# Patient Record
Sex: FEMALE | Race: WHITE | NOT HISPANIC OR LATINO | Employment: OTHER | URBAN - METROPOLITAN AREA
[De-identification: names, ages, dates, MRNs, and addresses within clinical notes are randomized per-mention and may not be internally consistent; named-entity substitution may affect disease eponyms.]

---

## 2017-02-28 ENCOUNTER — APPOINTMENT (OUTPATIENT)
Dept: GENERAL RADIOLOGY | Facility: HOSPITAL | Age: 82
End: 2017-02-28

## 2017-02-28 ENCOUNTER — HOSPITAL ENCOUNTER (EMERGENCY)
Facility: HOSPITAL | Age: 82
Discharge: HOME | End: 2017-02-28
Attending: EMERGENCY MEDICINE | Admitting: EMERGENCY MEDICINE

## 2017-02-28 VITALS
TEMPERATURE: 98.7 F | OXYGEN SATURATION: 95 % | HEART RATE: 72 BPM | WEIGHT: 186 LBS | SYSTOLIC BLOOD PRESSURE: 169 MMHG | RESPIRATION RATE: 18 BRPM | HEIGHT: 68 IN | DIASTOLIC BLOOD PRESSURE: 84 MMHG | BODY MASS INDEX: 28.19 KG/M2

## 2017-02-28 DIAGNOSIS — R10.31 RIGHT GROIN PAIN: Primary | ICD-10-CM

## 2017-02-28 DIAGNOSIS — G57.11 MERALGIA PARESTHETICA OF RIGHT SIDE: ICD-10-CM

## 2017-02-28 PROCEDURE — 99284 EMERGENCY DEPT VISIT MOD MDM: CPT | Performed by: EMERGENCY MEDICINE

## 2017-02-28 PROCEDURE — 99283 EMERGENCY DEPT VISIT LOW MDM: CPT

## 2017-02-28 PROCEDURE — 73502 X-RAY EXAM HIP UNI 2-3 VIEWS: CPT

## 2017-02-28 RX ORDER — EZETIMIBE 10 MG/1
10 TABLET ORAL DAILY
COMMUNITY
End: 2017-09-15 | Stop reason: SDUPTHER

## 2017-02-28 RX ORDER — IBUPROFEN 800 MG/1
800 TABLET ORAL EVERY 6 HOURS PRN
COMMUNITY
End: 2018-04-11

## 2017-03-01 NOTE — DISCHARGE INSTRUCTIONS
Rest and apply heating pad to affected area as needed.  May take ibuprofen or Tylenol for additional pain relief.  Please return to the emergency room for any worsening pain, fevers, swelling, redness, rashes, weakness, numbness or any other concerns.

## 2017-03-01 NOTE — ED PROVIDER NOTES
Subjective   History of Present Illness  History of Present Illness    Chief complaint: Groin pain    Location: Right groin    Quality/Severity:  Moderate pain, burning    Timing/Duration: On-and-off for 2 weeks, worse today    Modifying Factors: Worse with bearing weight, walking    Narrative: Patient is a rather active 82-year-old lady who walks regularly.  Over the past couple of weeks she has noticed increasing and repeated pains to her right groin area.  She denies any recent injuries or falls or strain patterns that she can remember.  She notes that she does occasionally lift heavy objects at various locations.  She continues having some burning type of pain in the right upper thigh and groin area that is worse with bearing weight or moving her leg and a raised position.  She denies any fevers.  She denies any swelling or redness.  She denies any blisters or rashes.  She has been trying some ibuprofen as needed at home which doesn't relieve the pain temporarily but today seemed to be a worsening episode after walking around the neighborhood.    Associated Symptoms: As above    Review of Systems   Constitutional: Negative for activity change and fever.   HENT: Negative.    Eyes: Negative for pain and visual disturbance.   Respiratory: Negative for cough and shortness of breath.    Cardiovascular: Negative for chest pain.   Gastrointestinal: Negative for abdominal pain, nausea and vomiting.   Genitourinary: Negative for dysuria and flank pain.   Musculoskeletal: Positive for arthralgias, gait problem and myalgias. Negative for joint swelling.   Skin: Negative for color change, pallor, rash and wound.   Neurological: Negative for syncope and headaches.   Psychiatric/Behavioral: Negative for agitation and confusion.   All other systems reviewed and are negative.      Past Medical History   Diagnosis Date   • Diabetes mellitus    • Hyperlipidemia    • Panic attack        Allergies   Allergen Reactions   •  Penicillins        History reviewed. No pertinent past surgical history.    History reviewed. No pertinent family history.    Social History     Social History   • Marital status:      Spouse name: N/A   • Number of children: N/A   • Years of education: N/A     Social History Main Topics   • Smoking status: Former Smoker     Quit date: 9/7/1989   • Smokeless tobacco: None   • Alcohol use No   • Drug use: No   • Sexual activity: Not Asked     Other Topics Concern   • None     Social History Narrative   • None       ED Triage Vitals   Temp Heart Rate Resp BP SpO2   02/28/17 1842 02/28/17 1842 02/28/17 1842 02/28/17 1842 02/28/17 1842   98.7 °F (37.1 °C) 72 18 169/84 95 %      Temp src Heart Rate Source Patient Position BP Location FiO2 (%)   -- -- -- -- --                Objective   Physical Exam   Constitutional: She is oriented to person, place, and time. She appears well-developed and well-nourished. No distress.   HENT:   Head: Normocephalic and atraumatic.   Eyes: EOM are normal. Pupils are equal, round, and reactive to light. Right eye exhibits no discharge. Left eye exhibits no discharge.   Neck: Normal range of motion. Neck supple.   Cardiovascular: Normal rate, regular rhythm and intact distal pulses.    Pulmonary/Chest: Effort normal. No respiratory distress.   Musculoskeletal: Normal range of motion. She exhibits tenderness. She exhibits no edema or deformity.   The right groin and right hip area are really entirely nontender to palpation.  Patient can demonstrate full range of motion actively and tolerate full range of motion passively with flexion, extension, external and internal rotation of the hip joint.  There are no clicks, pops, or grinds with movement testing.  There is no induration or erythema or dermatologic changes at all.  The femoral artery is easily palpated and feels normal.  The leg is warm and well-perfused throughout.  Distal sensation is intact to the lower leg.   Neurological:  She is alert and oriented to person, place, and time.   Skin: Skin is warm and dry. No rash noted. She is not diaphoretic. No erythema.   Psychiatric: She has a normal mood and affect. Her behavior is normal. Judgment and thought content normal.   Nursing note and vitals reviewed.    RADIOLOGY        Study: X-ray right hip    Findings: Mild degenerative changes.  No fracture or dislocation    Interpreted Contemporaneously by none, independently viewed by me        Procedures         ED Course  ED Course   Comment By Time   X-ray appears rather reassuring to me for no acute orthopedic injury.  Consider arthritis changes versus possible meralgia paresthetica.  I spoke with patient about adjusting her activity level and continue symptomatic management at home.  Advise she might consider following up with orthopedist for further evaluation as needed. Cirilo Fatima MD 02/28 2238                  MDM  Number of Diagnoses or Management Options  Meralgia paresthetica of right side:   Right groin pain:      Amount and/or Complexity of Data Reviewed  Tests in the radiology section of CPT®: ordered and reviewed  Independent visualization of images, tracings, or specimens: yes    Risk of Complications, Morbidity, and/or Mortality  Presenting problems: moderate  Diagnostic procedures: low  Management options: low        Final diagnoses:   Right groin pain   Meralgia paresthetica of right side            Cirilo Fatima MD  02/28/17 8627

## 2017-03-27 ENCOUNTER — HOSPITAL ENCOUNTER (OUTPATIENT)
Facility: HOSPITAL | Age: 82
Setting detail: OBSERVATION
Discharge: HOME | End: 2017-03-28
Attending: EMERGENCY MEDICINE | Admitting: INTERNAL MEDICINE

## 2017-03-27 ENCOUNTER — APPOINTMENT (OUTPATIENT)
Dept: GENERAL RADIOLOGY | Facility: HOSPITAL | Age: 82
End: 2017-03-27

## 2017-03-27 ENCOUNTER — APPOINTMENT (OUTPATIENT)
Dept: CT IMAGING | Facility: HOSPITAL | Age: 82
End: 2017-03-27

## 2017-03-27 DIAGNOSIS — S62.102A LEFT WRIST FRACTURE, CLOSED, INITIAL ENCOUNTER: ICD-10-CM

## 2017-03-27 DIAGNOSIS — S60.211A CONTUSION OF RIGHT WRIST, INITIAL ENCOUNTER: ICD-10-CM

## 2017-03-27 DIAGNOSIS — S70.02XA CONTUSION OF LEFT HIP, INITIAL ENCOUNTER: ICD-10-CM

## 2017-03-27 DIAGNOSIS — R26.2 DIFFICULTY WALKING: ICD-10-CM

## 2017-03-27 DIAGNOSIS — R42 POSTURAL DIZZINESS: Primary | ICD-10-CM

## 2017-03-27 DIAGNOSIS — R11.11 NON-INTRACTABLE VOMITING WITHOUT NAUSEA, UNSPECIFIED VOMITING TYPE: ICD-10-CM

## 2017-03-27 LAB
ALBUMIN SERPL-MCNC: 4 G/DL (ref 3.5–5.2)
ALBUMIN/GLOB SERPL: 1.5 G/DL
ALP SERPL-CCNC: 77 U/L (ref 40–129)
ALT SERPL W P-5'-P-CCNC: 22 U/L (ref 5–33)
ANION GAP SERPL CALCULATED.3IONS-SCNC: 13.3 MMOL/L
AST SERPL-CCNC: 36 U/L (ref 5–32)
BASOPHILS # BLD AUTO: 0.04 10*3/MM3 (ref 0–0.2)
BASOPHILS NFR BLD AUTO: 0.3 % (ref 0–2)
BILIRUB SERPL-MCNC: 0.7 MG/DL (ref 0.2–1.2)
BUN BLD-MCNC: 23 MG/DL (ref 8–23)
BUN/CREAT SERPL: 18.1 (ref 7–25)
CALCIUM SPEC-SCNC: 9.1 MG/DL (ref 8.8–10.5)
CHLORIDE SERPL-SCNC: 99 MMOL/L (ref 98–107)
CO2 SERPL-SCNC: 26.7 MMOL/L (ref 22–29)
CREAT BLD-MCNC: 1.27 MG/DL (ref 0.57–1)
DEPRECATED RDW RBC AUTO: 41.5 FL (ref 37–54)
EOSINOPHIL # BLD AUTO: 0 10*3/MM3 (ref 0.1–0.3)
EOSINOPHIL NFR BLD AUTO: 0 % (ref 0–4)
ERYTHROCYTE [DISTWIDTH] IN BLOOD BY AUTOMATED COUNT: 12.6 % (ref 11.5–14.5)
GFR SERPL CREATININE-BSD FRML MDRD: 40 ML/MIN/1.73
GLOBULIN UR ELPH-MCNC: 2.6 GM/DL
GLUCOSE BLD-MCNC: 218 MG/DL (ref 65–99)
HCT VFR BLD AUTO: 35.7 % (ref 37–47)
HGB BLD-MCNC: 11.8 G/DL (ref 12–16)
IMM GRANULOCYTES # BLD: 0.07 10*3/MM3 (ref 0–0.03)
IMM GRANULOCYTES NFR BLD: 0.5 % (ref 0–0.5)
LIPASE SERPL-CCNC: 91 U/L (ref 13–60)
LYMPHOCYTES # BLD AUTO: 1.57 10*3/MM3 (ref 0.6–4.8)
LYMPHOCYTES NFR BLD AUTO: 10.9 % (ref 20–45)
MCH RBC QN AUTO: 29.5 PG (ref 27–31)
MCHC RBC AUTO-ENTMCNC: 33.1 G/DL (ref 31–37)
MCV RBC AUTO: 89.3 FL (ref 81–99)
MONOCYTES # BLD AUTO: 1.13 10*3/MM3 (ref 0–1)
MONOCYTES NFR BLD AUTO: 7.9 % (ref 3–8)
NEUTROPHILS # BLD AUTO: 11.58 10*3/MM3 (ref 1.5–8.3)
NEUTROPHILS NFR BLD AUTO: 80.4 % (ref 45–70)
NRBC BLD MANUAL-RTO: 0 /100 WBC (ref 0–0)
PLATELET # BLD AUTO: 253 10*3/MM3 (ref 140–500)
PMV BLD AUTO: 10.1 FL (ref 7.4–10.4)
POTASSIUM BLD-SCNC: 4.4 MMOL/L (ref 3.5–5.2)
PROT SERPL-MCNC: 6.6 G/DL (ref 6–8.5)
RBC # BLD AUTO: 4 10*6/MM3 (ref 4.2–5.4)
SODIUM BLD-SCNC: 139 MMOL/L (ref 136–145)
WBC NRBC COR # BLD: 14.39 10*3/MM3 (ref 4.8–10.8)

## 2017-03-27 PROCEDURE — 99285 EMERGENCY DEPT VISIT HI MDM: CPT | Performed by: EMERGENCY MEDICINE

## 2017-03-27 PROCEDURE — 73502 X-RAY EXAM HIP UNI 2-3 VIEWS: CPT

## 2017-03-27 PROCEDURE — 96374 THER/PROPH/DIAG INJ IV PUSH: CPT

## 2017-03-27 PROCEDURE — 80053 COMPREHEN METABOLIC PANEL: CPT | Performed by: EMERGENCY MEDICINE

## 2017-03-27 PROCEDURE — 25010000002 ONDANSETRON PER 1 MG: Performed by: EMERGENCY MEDICINE

## 2017-03-27 PROCEDURE — 73130 X-RAY EXAM OF HAND: CPT

## 2017-03-27 PROCEDURE — 85025 COMPLETE CBC W/AUTO DIFF WBC: CPT | Performed by: EMERGENCY MEDICINE

## 2017-03-27 PROCEDURE — 83690 ASSAY OF LIPASE: CPT | Performed by: EMERGENCY MEDICINE

## 2017-03-27 PROCEDURE — 70450 CT HEAD/BRAIN W/O DYE: CPT

## 2017-03-27 PROCEDURE — 99283 EMERGENCY DEPT VISIT LOW MDM: CPT

## 2017-03-27 PROCEDURE — 96361 HYDRATE IV INFUSION ADD-ON: CPT

## 2017-03-27 PROCEDURE — 73110 X-RAY EXAM OF WRIST: CPT

## 2017-03-27 RX ORDER — SODIUM CHLORIDE 0.9 % (FLUSH) 0.9 %
10 SYRINGE (ML) INJECTION AS NEEDED
Status: DISCONTINUED | OUTPATIENT
Start: 2017-03-27 | End: 2017-03-28 | Stop reason: HOSPADM

## 2017-03-27 RX ORDER — SODIUM CHLORIDE 9 MG/ML
250 INJECTION, SOLUTION INTRAVENOUS CONTINUOUS
Status: DISCONTINUED | OUTPATIENT
Start: 2017-03-27 | End: 2017-03-28

## 2017-03-27 RX ORDER — ONDANSETRON 2 MG/ML
4 INJECTION INTRAMUSCULAR; INTRAVENOUS ONCE
Status: COMPLETED | OUTPATIENT
Start: 2017-03-27 | End: 2017-03-27

## 2017-03-27 RX ADMIN — SODIUM CHLORIDE 250 ML: 9 INJECTION, SOLUTION INTRAVENOUS at 22:55

## 2017-03-27 RX ADMIN — ONDANSETRON 4 MG: 2 INJECTION, SOLUTION INTRAMUSCULAR; INTRAVENOUS at 22:55

## 2017-03-28 ENCOUNTER — APPOINTMENT (OUTPATIENT)
Dept: GENERAL RADIOLOGY | Facility: HOSPITAL | Age: 82
End: 2017-03-28

## 2017-03-28 VITALS
WEIGHT: 184.5 LBS | BODY MASS INDEX: 27.33 KG/M2 | RESPIRATION RATE: 18 BRPM | OXYGEN SATURATION: 96 % | TEMPERATURE: 97.2 F | HEIGHT: 69 IN | DIASTOLIC BLOOD PRESSURE: 66 MMHG | SYSTOLIC BLOOD PRESSURE: 130 MMHG | HEART RATE: 110 BPM

## 2017-03-28 PROBLEM — R42 POSTURAL DIZZINESS: Status: ACTIVE | Noted: 2017-03-28

## 2017-03-28 LAB
25(OH)D3 SERPL-MCNC: 25.1 NG/ML
ANION GAP SERPL CALCULATED.3IONS-SCNC: 10.8 MMOL/L
ANION GAP SERPL CALCULATED.3IONS-SCNC: 9.9 MMOL/L
BACTERIA UR QL AUTO: ABNORMAL /HPF
BASOPHILS # BLD AUTO: 0.03 10*3/MM3 (ref 0–0.2)
BASOPHILS # BLD AUTO: 0.03 10*3/MM3 (ref 0–0.2)
BASOPHILS NFR BLD AUTO: 0.3 % (ref 0–2)
BASOPHILS NFR BLD AUTO: 0.3 % (ref 0–2)
BILIRUB UR QL STRIP: NEGATIVE
BUN BLD-MCNC: 20 MG/DL (ref 8–23)
BUN BLD-MCNC: 22 MG/DL (ref 8–23)
BUN/CREAT SERPL: 17.5 (ref 7–25)
BUN/CREAT SERPL: 20.2 (ref 7–25)
CALCIUM SPEC-SCNC: 8.2 MG/DL (ref 8.8–10.5)
CALCIUM SPEC-SCNC: 8.2 MG/DL (ref 8.8–10.5)
CHLORIDE SERPL-SCNC: 101 MMOL/L (ref 98–107)
CHLORIDE SERPL-SCNC: 103 MMOL/L (ref 98–107)
CLARITY UR: CLEAR
CO2 SERPL-SCNC: 24.1 MMOL/L (ref 22–29)
CO2 SERPL-SCNC: 25.2 MMOL/L (ref 22–29)
COLOR UR: YELLOW
CREAT BLD-MCNC: 1.09 MG/DL (ref 0.57–1)
CREAT BLD-MCNC: 1.14 MG/DL (ref 0.57–1)
DEPRECATED RDW RBC AUTO: 42.1 FL (ref 37–54)
DEPRECATED RDW RBC AUTO: 42.3 FL (ref 37–54)
EOSINOPHIL # BLD AUTO: 0 10*3/MM3 (ref 0.1–0.3)
EOSINOPHIL # BLD AUTO: 0 10*3/MM3 (ref 0.1–0.3)
EOSINOPHIL NFR BLD AUTO: 0 % (ref 0–4)
EOSINOPHIL NFR BLD AUTO: 0 % (ref 0–4)
ERYTHROCYTE [DISTWIDTH] IN BLOOD BY AUTOMATED COUNT: 12.8 % (ref 11.5–14.5)
ERYTHROCYTE [DISTWIDTH] IN BLOOD BY AUTOMATED COUNT: 12.8 % (ref 11.5–14.5)
GFR SERPL CREATININE-BSD FRML MDRD: 46 ML/MIN/1.73
GFR SERPL CREATININE-BSD FRML MDRD: 48 ML/MIN/1.73
GLUCOSE BLD-MCNC: 158 MG/DL (ref 65–99)
GLUCOSE BLD-MCNC: 199 MG/DL (ref 65–99)
GLUCOSE BLDC GLUCOMTR-MCNC: 175 MG/DL (ref 70–130)
GLUCOSE BLDC GLUCOMTR-MCNC: 196 MG/DL (ref 70–130)
GLUCOSE BLDC GLUCOMTR-MCNC: 221 MG/DL (ref 70–130)
GLUCOSE UR STRIP-MCNC: NEGATIVE MG/DL
HBA1C MFR BLD: 6.8 % (ref 4.8–5.6)
HCT VFR BLD AUTO: 30.5 % (ref 37–47)
HCT VFR BLD AUTO: 31.4 % (ref 37–47)
HGB BLD-MCNC: 10.4 G/DL (ref 12–16)
HGB BLD-MCNC: 9.8 G/DL (ref 12–16)
HGB UR QL STRIP.AUTO: NEGATIVE
HYALINE CASTS UR QL AUTO: ABNORMAL /LPF
IMM GRANULOCYTES # BLD: 0.04 10*3/MM3 (ref 0–0.03)
IMM GRANULOCYTES # BLD: 0.04 10*3/MM3 (ref 0–0.03)
IMM GRANULOCYTES NFR BLD: 0.3 % (ref 0–0.5)
IMM GRANULOCYTES NFR BLD: 0.3 % (ref 0–0.5)
KETONES UR QL STRIP: ABNORMAL
LEUKOCYTE ESTERASE UR QL STRIP.AUTO: ABNORMAL
LIPASE SERPL-CCNC: 50 U/L (ref 13–60)
LYMPHOCYTES # BLD AUTO: 1.31 10*3/MM3 (ref 0.6–4.8)
LYMPHOCYTES # BLD AUTO: 2.65 10*3/MM3 (ref 0.6–4.8)
LYMPHOCYTES NFR BLD AUTO: 10.9 % (ref 20–45)
LYMPHOCYTES NFR BLD AUTO: 22.8 % (ref 20–45)
MCH RBC QN AUTO: 28.9 PG (ref 27–31)
MCH RBC QN AUTO: 30 PG (ref 27–31)
MCHC RBC AUTO-ENTMCNC: 32.1 G/DL (ref 31–37)
MCHC RBC AUTO-ENTMCNC: 33.1 G/DL (ref 31–37)
MCV RBC AUTO: 90 FL (ref 81–99)
MCV RBC AUTO: 90.5 FL (ref 81–99)
MONOCYTES # BLD AUTO: 0.75 10*3/MM3 (ref 0–1)
MONOCYTES # BLD AUTO: 1.04 10*3/MM3 (ref 0–1)
MONOCYTES NFR BLD AUTO: 6.3 % (ref 3–8)
MONOCYTES NFR BLD AUTO: 9 % (ref 3–8)
MUCOUS THREADS URNS QL MICRO: ABNORMAL /HPF
NEUTROPHILS # BLD AUTO: 7.86 10*3/MM3 (ref 1.5–8.3)
NEUTROPHILS # BLD AUTO: 9.84 10*3/MM3 (ref 1.5–8.3)
NEUTROPHILS NFR BLD AUTO: 67.6 % (ref 45–70)
NEUTROPHILS NFR BLD AUTO: 82.2 % (ref 45–70)
NITRITE UR QL STRIP: NEGATIVE
NRBC BLD MANUAL-RTO: 0 /100 WBC (ref 0–0)
NRBC BLD MANUAL-RTO: 0 /100 WBC (ref 0–0)
PH UR STRIP.AUTO: 6 [PH] (ref 4.5–8)
PLATELET # BLD AUTO: 194 10*3/MM3 (ref 140–500)
PLATELET # BLD AUTO: 219 10*3/MM3 (ref 140–500)
PMV BLD AUTO: 10.4 FL (ref 7.4–10.4)
PMV BLD AUTO: 10.4 FL (ref 7.4–10.4)
POTASSIUM BLD-SCNC: 4.4 MMOL/L (ref 3.5–5.2)
POTASSIUM BLD-SCNC: 4.4 MMOL/L (ref 3.5–5.2)
PROT UR QL STRIP: NEGATIVE
RBC # BLD AUTO: 3.39 10*6/MM3 (ref 4.2–5.4)
RBC # BLD AUTO: 3.47 10*6/MM3 (ref 4.2–5.4)
RBC # UR: ABNORMAL /HPF
REF LAB TEST METHOD: ABNORMAL
SODIUM BLD-SCNC: 135 MMOL/L (ref 136–145)
SODIUM BLD-SCNC: 139 MMOL/L (ref 136–145)
SP GR UR STRIP: 1.02 (ref 1–1.03)
SQUAMOUS #/AREA URNS HPF: ABNORMAL /HPF
TSH SERPL DL<=0.05 MIU/L-ACNC: 2.61 MIU/ML (ref 0.27–4.2)
UROBILINOGEN UR QL STRIP: ABNORMAL
WBC NRBC COR # BLD: 11.62 10*3/MM3 (ref 4.8–10.8)
WBC NRBC COR # BLD: 11.97 10*3/MM3 (ref 4.8–10.8)
WBC UR QL AUTO: ABNORMAL /HPF

## 2017-03-28 PROCEDURE — 82962 GLUCOSE BLOOD TEST: CPT

## 2017-03-28 PROCEDURE — G0378 HOSPITAL OBSERVATION PER HR: HCPCS

## 2017-03-28 PROCEDURE — G8987 SELF CARE CURRENT STATUS: HCPCS

## 2017-03-28 PROCEDURE — 93010 ELECTROCARDIOGRAM REPORT: CPT | Performed by: INTERNAL MEDICINE

## 2017-03-28 PROCEDURE — 83036 HEMOGLOBIN GLYCOSYLATED A1C: CPT | Performed by: NURSE PRACTITIONER

## 2017-03-28 PROCEDURE — 97165 OT EVAL LOW COMPLEX 30 MIN: CPT

## 2017-03-28 PROCEDURE — 25600 CLTX DST RDL FX/EPHYS SEP WO: CPT | Performed by: ORTHOPAEDIC SURGERY

## 2017-03-28 PROCEDURE — 87086 URINE CULTURE/COLONY COUNT: CPT | Performed by: EMERGENCY MEDICINE

## 2017-03-28 PROCEDURE — 96361 HYDRATE IV INFUSION ADD-ON: CPT

## 2017-03-28 PROCEDURE — 85025 COMPLETE CBC W/AUTO DIFF WBC: CPT | Performed by: INTERNAL MEDICINE

## 2017-03-28 PROCEDURE — G8978 MOBILITY CURRENT STATUS: HCPCS

## 2017-03-28 PROCEDURE — 96375 TX/PRO/DX INJ NEW DRUG ADDON: CPT

## 2017-03-28 PROCEDURE — G8979 MOBILITY GOAL STATUS: HCPCS

## 2017-03-28 PROCEDURE — 73130 X-RAY EXAM OF HAND: CPT

## 2017-03-28 PROCEDURE — G8980 MOBILITY D/C STATUS: HCPCS

## 2017-03-28 PROCEDURE — 80048 BASIC METABOLIC PNL TOTAL CA: CPT | Performed by: INTERNAL MEDICINE

## 2017-03-28 PROCEDURE — G8989 SELF CARE D/C STATUS: HCPCS

## 2017-03-28 PROCEDURE — 82306 VITAMIN D 25 HYDROXY: CPT | Performed by: NURSE PRACTITIONER

## 2017-03-28 PROCEDURE — 25010000002 ONDANSETRON PER 1 MG: Performed by: INTERNAL MEDICINE

## 2017-03-28 PROCEDURE — 96376 TX/PRO/DX INJ SAME DRUG ADON: CPT

## 2017-03-28 PROCEDURE — 81001 URINALYSIS AUTO W/SCOPE: CPT | Performed by: EMERGENCY MEDICINE

## 2017-03-28 PROCEDURE — G8988 SELF CARE GOAL STATUS: HCPCS

## 2017-03-28 PROCEDURE — 97161 PT EVAL LOW COMPLEX 20 MIN: CPT

## 2017-03-28 PROCEDURE — 73110 X-RAY EXAM OF WRIST: CPT

## 2017-03-28 PROCEDURE — 99234 HOSP IP/OBS SM DT SF/LOW 45: CPT | Performed by: NURSE PRACTITIONER

## 2017-03-28 PROCEDURE — 84443 ASSAY THYROID STIM HORMONE: CPT | Performed by: INTERNAL MEDICINE

## 2017-03-28 PROCEDURE — 83690 ASSAY OF LIPASE: CPT | Performed by: NURSE PRACTITIONER

## 2017-03-28 PROCEDURE — 93005 ELECTROCARDIOGRAM TRACING: CPT | Performed by: NURSE PRACTITIONER

## 2017-03-28 PROCEDURE — 63710000001 INSULIN ASPART PER 5 UNITS: Performed by: INTERNAL MEDICINE

## 2017-03-28 RX ORDER — MULTIVIT WITH MINERALS/LUTEIN
250 TABLET ORAL DAILY
Status: ON HOLD | COMMUNITY
End: 2021-08-18

## 2017-03-28 RX ORDER — PANTOPRAZOLE SODIUM 40 MG/10ML
40 INJECTION, POWDER, LYOPHILIZED, FOR SOLUTION INTRAVENOUS
Status: DISCONTINUED | OUTPATIENT
Start: 2017-03-28 | End: 2017-03-28

## 2017-03-28 RX ORDER — DEXTROSE MONOHYDRATE 25 G/50ML
25 INJECTION, SOLUTION INTRAVENOUS
Status: DISCONTINUED | OUTPATIENT
Start: 2017-03-28 | End: 2017-03-28 | Stop reason: HOSPADM

## 2017-03-28 RX ORDER — FAMOTIDINE 20 MG/1
20 TABLET, FILM COATED ORAL NIGHTLY
Status: DISCONTINUED | OUTPATIENT
Start: 2017-03-28 | End: 2017-03-28 | Stop reason: HOSPADM

## 2017-03-28 RX ORDER — NICOTINE POLACRILEX 4 MG
15 LOZENGE BUCCAL
Status: DISCONTINUED | OUTPATIENT
Start: 2017-03-28 | End: 2017-03-28 | Stop reason: HOSPADM

## 2017-03-28 RX ORDER — MELATONIN
2000 DAILY
Status: DISCONTINUED | OUTPATIENT
Start: 2017-03-28 | End: 2017-03-28 | Stop reason: HOSPADM

## 2017-03-28 RX ORDER — SODIUM CHLORIDE 0.9 % (FLUSH) 0.9 %
1-10 SYRINGE (ML) INJECTION AS NEEDED
Status: DISCONTINUED | OUTPATIENT
Start: 2017-03-28 | End: 2017-03-28 | Stop reason: HOSPADM

## 2017-03-28 RX ORDER — MULTIPLE VITAMINS W/ MINERALS TAB 9MG-400MCG
1 TAB ORAL DAILY
Status: ON HOLD | COMMUNITY
End: 2021-08-18

## 2017-03-28 RX ORDER — SIMVASTATIN 10 MG
10 TABLET ORAL NIGHTLY
Status: DISCONTINUED | OUTPATIENT
Start: 2017-03-28 | End: 2017-03-28 | Stop reason: CLARIF

## 2017-03-28 RX ORDER — TRAMADOL HYDROCHLORIDE 50 MG/1
50 TABLET ORAL EVERY 6 HOURS PRN
Status: DISCONTINUED | OUTPATIENT
Start: 2017-03-28 | End: 2017-03-28 | Stop reason: HOSPADM

## 2017-03-28 RX ORDER — ATORVASTATIN CALCIUM 10 MG/1
5 TABLET, FILM COATED ORAL NIGHTLY
Status: DISCONTINUED | OUTPATIENT
Start: 2017-03-28 | End: 2017-03-28 | Stop reason: HOSPADM

## 2017-03-28 RX ORDER — ACETAMINOPHEN 325 MG/1
650 TABLET ORAL EVERY 6 HOURS PRN
Status: DISCONTINUED | OUTPATIENT
Start: 2017-03-28 | End: 2017-03-28 | Stop reason: HOSPADM

## 2017-03-28 RX ORDER — ONDANSETRON 2 MG/ML
4 INJECTION INTRAMUSCULAR; INTRAVENOUS EVERY 6 HOURS PRN
Status: DISCONTINUED | OUTPATIENT
Start: 2017-03-28 | End: 2017-03-28 | Stop reason: HOSPADM

## 2017-03-28 RX ORDER — SODIUM CHLORIDE 9 MG/ML
100 INJECTION, SOLUTION INTRAVENOUS CONTINUOUS
Status: ACTIVE | OUTPATIENT
Start: 2017-03-28 | End: 2017-03-28

## 2017-03-28 RX ORDER — FLUOXETINE HYDROCHLORIDE 20 MG/1
20 CAPSULE ORAL DAILY
Status: DISCONTINUED | OUTPATIENT
Start: 2017-03-28 | End: 2017-03-28 | Stop reason: HOSPADM

## 2017-03-28 RX ADMIN — ACETAMINOPHEN 650 MG: 325 TABLET, FILM COATED ORAL at 01:50

## 2017-03-28 RX ADMIN — INSULIN ASPART 3 UNITS: 100 INJECTION, SOLUTION INTRAVENOUS; SUBCUTANEOUS at 12:25

## 2017-03-28 RX ADMIN — FAMOTIDINE 20 MG: 20 TABLET, FILM COATED ORAL at 01:51

## 2017-03-28 RX ADMIN — ONDANSETRON 4 MG: 2 INJECTION, SOLUTION INTRAMUSCULAR; INTRAVENOUS at 01:50

## 2017-03-28 RX ADMIN — VITAMIN D 2000 UNITS: 25 TAB ORAL at 12:25

## 2017-03-28 RX ADMIN — PANTOPRAZOLE SODIUM 40 MG: 40 INJECTION, POWDER, FOR SOLUTION INTRAVENOUS at 01:51

## 2017-03-28 RX ADMIN — ACETAMINOPHEN 650 MG: 325 TABLET, FILM COATED ORAL at 08:42

## 2017-03-28 RX ADMIN — INSULIN ASPART 2 UNITS: 100 INJECTION, SOLUTION INTRAVENOUS; SUBCUTANEOUS at 08:42

## 2017-03-28 RX ADMIN — SODIUM CHLORIDE 100 ML/HR: 9 INJECTION, SOLUTION INTRAVENOUS at 01:51

## 2017-03-28 RX ADMIN — PANTOPRAZOLE SODIUM 40 MG: 40 INJECTION, POWDER, FOR SOLUTION INTRAVENOUS at 06:37

## 2017-03-28 RX ADMIN — FLUOXETINE 20 MG: 20 CAPSULE ORAL at 08:42

## 2017-03-28 RX ADMIN — TRAMADOL HYDROCHLORIDE 50 MG: 50 TABLET, FILM COATED ORAL at 04:33

## 2017-03-29 LAB — BACTERIA SPEC AEROBE CULT: NO GROWTH

## 2017-04-05 ENCOUNTER — OFFICE VISIT (OUTPATIENT)
Dept: ORTHOPEDIC SURGERY | Facility: CLINIC | Age: 82
End: 2017-04-05

## 2017-04-05 VITALS
HEART RATE: 84 BPM | WEIGHT: 186 LBS | BODY MASS INDEX: 27.55 KG/M2 | SYSTOLIC BLOOD PRESSURE: 146 MMHG | HEIGHT: 69 IN | DIASTOLIC BLOOD PRESSURE: 90 MMHG

## 2017-04-05 DIAGNOSIS — S60.212A CONTUSION OF WRIST, LEFT: Primary | ICD-10-CM

## 2017-04-05 PROCEDURE — 99214 OFFICE O/P EST MOD 30 MIN: CPT | Performed by: ORTHOPAEDIC SURGERY

## 2017-04-05 RX ORDER — SIMVASTATIN 20 MG
TABLET ORAL
COMMUNITY
Start: 2017-03-13 | End: 2017-09-15 | Stop reason: SDUPTHER

## 2017-04-05 NOTE — PROGRESS NOTES
Subjective: Left wrist pain     Patient ID: Magnolia Rios is a 82 y.o. female.    Chief Complaint:    History of Present Illness 82-year-old female right-hand-dominant is seen for left wrist which he injured on Monday, March 27 when she fell down a flight of steps in her basement at her home.  States she was climbing into the top of the stairs and attempted open a door she lost her balance and fell backwards falling down the steps.  Does not recall anything after that tissue is in the emergency room.  She had apparent loss of consciousness.  Evaluated in the ER for bruising over left hip and left wrist and had a CT scan of her head.  I reviewed all the x-rays and the x-ray report.  All were negative for acute injuries.  The CT of the head of course was negative for bleed.  She is placed in a wrist splint on the left wrist.  She states that they've told she had a fracture.  Today she is alert and oriented in mild distress because of the left wrist more so because of having or the splint and the sling than any pain in the wrist itself.  Some soreness over the lateral aspect of the left hip in generalized soreness and falling down the steps but no apparent sequela I other than left wrist pain at this time.       Social History     Occupational History   • Not on file.     Social History Main Topics   • Smoking status: Former Smoker     Quit date: 9/7/1989   • Smokeless tobacco: Not on file   • Alcohol use No   • Drug use: No   • Sexual activity: Not on file      Review of Systems   Constitutional: Negative for chills, diaphoresis, fever and unexpected weight change.   HENT: Positive for tinnitus. Negative for hearing loss, nosebleeds and sore throat.    Eyes: Negative for pain and visual disturbance.   Respiratory: Negative for cough, shortness of breath and wheezing.    Cardiovascular: Negative for chest pain and palpitations.   Gastrointestinal: Positive for nausea and vomiting. Negative for abdominal pain and  diarrhea.   Endocrine: Negative for cold intolerance, heat intolerance and polydipsia.   Genitourinary: Negative for difficulty urinating, dysuria and hematuria.   Musculoskeletal: Positive for arthralgias, joint swelling and myalgias.   Skin: Negative for rash and wound.   Allergic/Immunologic: Negative for environmental allergies.   Neurological: Negative for dizziness, syncope and numbness.   Hematological: Does not bruise/bleed easily.   Psychiatric/Behavioral: Negative for dysphoric mood and sleep disturbance. The patient is nervous/anxious.          Past Medical History:   Diagnosis Date   • Diabetes mellitus    • Hyperlipidemia    • Panic attack      Past Surgical History:   Procedure Laterality Date   • COLONOSCOPY     • HYSTERECTOMY       No family history on file.      Objective:  Vitals:    04/05/17 1435   BP: 146/90   Pulse: 84     Last 3 weights    04/05/17  1435   Weight: 186 lb (84.4 kg)     Body mass index is 27.47 kg/(m^2).       Ortho Exam  patient is alert and oriented white female in no acute distress at this point.  She is well-nourished.  Vital signs are documented in the chart.  His no facial contusions involving her eyes are nose or bruising .  Sclera clear.  No complaints of neck pain.  She is able to take anti-inflammatories and has been taking ibuprofen twice a day since she has no GI symptoms.  She is neurologically intact as far as being oriented to person place and time.  Exam of the left upper extremity shows ecchymosis in the volar aspect of the forearm.  Some bruising about the wrist.  The skin is intact.  The skin is cool to touch.  His no motor deficit good distal pulses.  No sensory loss.  Mild swelling to the forearm but no evidence of compartmental compromise she has good capillary refill.  The some tenderness in the volar aspect over the musculature of the wrist but no bone tenderness on palpation.  She has good intact radial ulnar and median nerve function of the wrist and  hand.  She has full range of motion of the elbow.  She has full pronation and supination of the elbow in flexion and extension.  She can dorsiflex the wrist 25-30° and volar flex the same.  Minimal to no pain with ulnar radial deviation.  She can extend the digits without pain or discomfort.  Minimal swelling noted to the digit but she has full flexion of the hand.  She is in moderate distress for more from wearing a cradle sling than the wrist pain.  Assessment:       1. Contusion of wrist, left          Plan:      I reviewed the x-rays and told her I don't see a fracture.  I saved is sometimes though are difficult to see on initial x-rays after an injury.  At this time I recommend to remove the wrist splint when at home and begin moist heat for 20 minutes twice a day with range of motion to the wrist followed by cold pack for 5 minutes.  She is to take ibuprofen 600 mg 3 times a day.  She is to wear this wrist splint when she is out of the home but she doesn't need to wear it in the past.  She can DC the sling today.  Return to see me in 2 weeks with a repeat x-ray of the wrist.  Reviewed the physical findings and treatment plan with the patient and her daughter and in agreement.      Work Status:    KATHRYN query complete.    Orders:  No orders of the defined types were placed in this encounter.      Medications:  New Medications Ordered This Visit   Medications   • simvastatin (ZOCOR) 20 MG tablet       Followup:  Return in about 2 weeks (around 4/19/2017).          Dragon transcription disclaimer     Much of this encounter note is an electronic transcription/translation of spoken language to printed text. The electronic translation of spoken language may permit erroneous, or at times, nonsensical words or phrases to be inadvertently transcribed. Although I have reviewed the note for such errors, some may still exist.

## 2017-04-17 ENCOUNTER — OFFICE VISIT (OUTPATIENT)
Dept: ORTHOPEDIC SURGERY | Facility: CLINIC | Age: 82
End: 2017-04-17

## 2017-04-17 DIAGNOSIS — R52 PAIN: Primary | ICD-10-CM

## 2017-04-17 DIAGNOSIS — S60.212A CONTUSION OF WRIST, LEFT: ICD-10-CM

## 2017-04-17 PROCEDURE — 73110 X-RAY EXAM OF WRIST: CPT | Performed by: ORTHOPAEDIC SURGERY

## 2017-04-17 PROCEDURE — 99024 POSTOP FOLLOW-UP VISIT: CPT | Performed by: ORTHOPAEDIC SURGERY

## 2017-04-17 RX ORDER — ALPRAZOLAM 0.25 MG/1
TABLET ORAL
COMMUNITY
Start: 2017-04-15 | End: 2017-09-15 | Stop reason: SDUPTHER

## 2017-04-17 NOTE — PROGRESS NOTES
Subjective: Left wrist pain     Patient ID: Magnolia Rios is a 82 y.o. female.    Chief Complaint:    History of Present Illness 82-year-old female is seen in follow-up to the left wrist which he injured couple weeks ago when she fell down her basement steps.  That time she felt to have a contusion was placed in a wrist immobilizer instructed on nonsteroidal anti-inflammatories.  She states she is doing much better.  She states now the only time she has pain as she takes her splint off at nighttime when she goes to bed we'll then have some soreness and during the day she has minimal of any discomfort.       Social History     Occupational History   • Not on file.     Social History Main Topics   • Smoking status: Former Smoker     Quit date: 9/7/1989   • Smokeless tobacco: Not on file   • Alcohol use No   • Drug use: No   • Sexual activity: Not on file      Review of Systems   Constitutional: Negative for chills, diaphoresis, fever and unexpected weight change.   HENT: Negative for hearing loss, nosebleeds, sore throat and tinnitus.    Eyes: Negative for pain and visual disturbance.   Respiratory: Negative for cough, shortness of breath and wheezing.    Cardiovascular: Negative for chest pain and palpitations.   Gastrointestinal: Negative for abdominal pain, diarrhea, nausea and vomiting.   Endocrine: Negative for cold intolerance, heat intolerance and polydipsia.   Genitourinary: Negative for difficulty urinating, dysuria and hematuria.   Musculoskeletal: Negative for arthralgias, joint swelling and myalgias.   Skin: Negative for rash and wound.   Allergic/Immunologic: Negative for environmental allergies.   Neurological: Negative for dizziness, syncope and numbness.   Hematological: Does not bruise/bleed easily.   Psychiatric/Behavioral: Negative for dysphoric mood and sleep disturbance. The patient is not nervous/anxious.    All other systems reviewed and are negative.        Past Medical History:   Diagnosis  Date   • Diabetes mellitus    • Hyperlipidemia    • Panic attack      Past Surgical History:   Procedure Laterality Date   • COLONOSCOPY     • HYSTERECTOMY       No family history on file.      Objective:  There were no vitals filed for this visit.  There were no vitals filed for this visit.  There is no height or weight on file to calculate BMI.       Ortho Exam  AP lateral oblique view done of the wrist to evaluate her chief complaint is unremarkable for any acute changes.  There are some arthritic changes noted.  Compared to the x-rays done at the hospital there is no changes.  On exam of the left upper extremity.  There is no motor loss.  Good distal pulses.  Median ulnar and radial nerve function are intact.  There is no sensory loss.  No swelling to the upper arm of the foot or the forearm indicating vascular compromise.  The skin is cool to touch.  She has full dorsiflexion and volar flexion of the wrist.  Clench fist testing is negative for any pain or discomfort.  No pain with extension of the digits against resistance or flexion of the digits.  Minimal of any tenderness over the distal radius.  The skin remains cool to touch and there is no ecchymosis noted.    Assessment:       1. Pain    2. Contusion of wrist, left          Plan:      reviewed the physical findings with the patient.  She's been taking the ibuprofen irregularly*recommend she take 3 ibuprofen twice a day to eliminate any soreness she has an arrest or in her left hip which she also bruise of time of the fall.  She can DC the wrist splint.  Return to see me as needed.  Discussed this treatment plan with the patient and she was in agreement.      Work Status:    KATHRYN query complete.    Orders:  Orders Placed This Encounter   Procedures   • XR Wrist 3+ View Left       Medications:  New Medications Ordered This Visit   Medications   • ALPRAZolam (XANAX) 0.25 MG tablet       Followup:  Return if symptoms worsen or fail to improve.           Dragon transcription disclaimer     Much of this encounter note is an electronic transcription/translation of spoken language to printed text. The electronic translation of spoken language may permit erroneous, or at times, nonsensical words or phrases to be inadvertently transcribed. Although I have reviewed the note for such errors, some may still exist.

## 2017-04-20 ENCOUNTER — TELEPHONE (OUTPATIENT)
Dept: ORTHOPEDIC SURGERY | Facility: CLINIC | Age: 82
End: 2017-04-20

## 2017-04-20 NOTE — TELEPHONE ENCOUNTER
Went home and mowed an acre of grass after she saw you, Arm in severe extreme pain.  What do you recommend for her to do?  She took 600 mg of Ibuprofen with no help.  Should she get back in the brace?

## 2017-04-20 NOTE — TELEPHONE ENCOUNTER
Gave message to pt, told her to not mow the grass for a while and see if she can get the pain under control.

## 2017-04-20 NOTE — TELEPHONE ENCOUNTER
She get back in her brace.  Take 800 of ibuprofen 3 times a day.  Begin contrast baths heat for 15 minutes followed by 5 minutes of an icepack twice a day

## 2017-05-16 ENCOUNTER — OFFICE VISIT (OUTPATIENT)
Dept: FAMILY MEDICINE CLINIC | Facility: CLINIC | Age: 82
End: 2017-05-16

## 2017-05-16 VITALS
DIASTOLIC BLOOD PRESSURE: 80 MMHG | TEMPERATURE: 97.3 F | OXYGEN SATURATION: 98 % | HEART RATE: 62 BPM | BODY MASS INDEX: 28.28 KG/M2 | SYSTOLIC BLOOD PRESSURE: 142 MMHG | HEIGHT: 68 IN | WEIGHT: 186.6 LBS

## 2017-05-16 DIAGNOSIS — E11.9 TYPE 2 DIABETES MELLITUS WITHOUT COMPLICATION, WITHOUT LONG-TERM CURRENT USE OF INSULIN (HCC): Primary | ICD-10-CM

## 2017-05-16 DIAGNOSIS — S52.531D CLOSED COLLES' FRACTURE OF RIGHT RADIUS WITH ROUTINE HEALING, SUBSEQUENT ENCOUNTER: ICD-10-CM

## 2017-05-16 DIAGNOSIS — Z78.0 MENOPAUSE: ICD-10-CM

## 2017-05-16 PROBLEM — S52.501D: Status: ACTIVE | Noted: 2017-05-16

## 2017-05-16 PROCEDURE — 99204 OFFICE O/P NEW MOD 45 MIN: CPT | Performed by: FAMILY MEDICINE

## 2017-05-23 ENCOUNTER — APPOINTMENT (OUTPATIENT)
Dept: BONE DENSITY | Facility: HOSPITAL | Age: 82
End: 2017-05-23
Attending: FAMILY MEDICINE

## 2017-05-23 DIAGNOSIS — Z78.0 MENOPAUSE: ICD-10-CM

## 2017-05-23 PROCEDURE — 77080 DXA BONE DENSITY AXIAL: CPT

## 2017-08-02 RX ORDER — FLUOXETINE HYDROCHLORIDE 20 MG/1
20 CAPSULE ORAL DAILY
Qty: 90 CAPSULE | Refills: 1 | Status: SHIPPED | OUTPATIENT
Start: 2017-08-02 | End: 2018-01-30 | Stop reason: SDUPTHER

## 2017-09-15 ENCOUNTER — OFFICE VISIT (OUTPATIENT)
Dept: FAMILY MEDICINE CLINIC | Facility: CLINIC | Age: 82
End: 2017-09-15

## 2017-09-15 VITALS
HEIGHT: 68 IN | SYSTOLIC BLOOD PRESSURE: 134 MMHG | OXYGEN SATURATION: 90 % | WEIGHT: 180.4 LBS | BODY MASS INDEX: 27.34 KG/M2 | HEART RATE: 70 BPM | TEMPERATURE: 98.1 F | DIASTOLIC BLOOD PRESSURE: 88 MMHG

## 2017-09-15 DIAGNOSIS — E11.9 TYPE 2 DIABETES MELLITUS WITHOUT COMPLICATION, WITHOUT LONG-TERM CURRENT USE OF INSULIN (HCC): Primary | ICD-10-CM

## 2017-09-15 DIAGNOSIS — F41.0 PANIC ATTACK: ICD-10-CM

## 2017-09-15 DIAGNOSIS — E78.2 MIXED HYPERLIPIDEMIA: ICD-10-CM

## 2017-09-15 DIAGNOSIS — F33.42 RECURRENT MAJOR DEPRESSIVE DISORDER, IN FULL REMISSION (HCC): ICD-10-CM

## 2017-09-15 DIAGNOSIS — Z51.81 MEDICATION MONITORING ENCOUNTER: ICD-10-CM

## 2017-09-15 PROCEDURE — 99213 OFFICE O/P EST LOW 20 MIN: CPT | Performed by: FAMILY MEDICINE

## 2017-09-15 RX ORDER — SIMVASTATIN 20 MG
20 TABLET ORAL NIGHTLY
Qty: 90 TABLET | Refills: 3 | Status: SHIPPED | OUTPATIENT
Start: 2017-09-15 | End: 2018-09-10 | Stop reason: SDUPTHER

## 2017-09-15 RX ORDER — CYCLOSPORINE 0.5 MG/ML
EMULSION OPHTHALMIC
COMMUNITY
Start: 2017-07-28 | End: 2017-09-15

## 2017-09-15 RX ORDER — ALPRAZOLAM 0.5 MG/1
0.5 TABLET ORAL NIGHTLY PRN
Qty: 30 TABLET | Refills: 1 | Status: SHIPPED | OUTPATIENT
Start: 2017-09-15 | End: 2018-07-10 | Stop reason: SDUPTHER

## 2017-09-15 RX ORDER — EZETIMIBE 10 MG/1
10 TABLET ORAL DAILY
Qty: 90 TABLET | Refills: 3 | Status: SHIPPED | OUTPATIENT
Start: 2017-09-15 | End: 2018-09-10 | Stop reason: SDUPTHER

## 2017-09-15 NOTE — PROGRESS NOTES
"  Chief Complaint   Patient presents with   • Follow-up   • Hyperlipidemia   • Diabetes       Subjective   Magnolia Rios is an 82 y.o. female who presents for follow up of diabetes. Current symptoms include: none. Patient denies foot ulcerations, hyperglycemia, hypoglycemia , paresthesia of the feet and visual disturbances. Evaluation to date has included: fasting blood sugar, fasting lipid panel and hemoglobin A1C. Home sugars: patient does not check sugars. Current treatments: more intensive attention to diet which has been effective, low cholesterol diet which has been effective and Continued statin which has been effective. Last dilated eye exam 2016.    Reviewed last labs , pre op during her wrist fracture.  Wrist is all better.  Due for labs.  BP not to goal for a diabetic  Her diabetes has been diet controlled.    Needs xanax , her old ones are     The following portions of the patient's history were reviewed and updated as appropriate: allergies, current medications, past family history, past medical history, past social history, past surgical history and problem list.        Review of Systems  Pertinent items are noted in HPI.     Vitals:    09/15/17 0928   BP: 134/88   BP Location: Left arm   Patient Position: Sitting   Pulse: 70   Temp: 98.1 °F (36.7 °C)   SpO2: 90%   Weight: 180 lb 6.4 oz (81.8 kg)   Height: 68\" (172.7 cm)       Objective    Gen:  Alert, pleasant  Ears: canals clear, TMs normal  Throat: clear , no thrush, teeth ok  Neck: no bruit, no LAD  Lungs: clear  Heart: RR no murmur  Feet:  No rash, no skin breakdown, sensation grossly normal.    Laboratory:       Assessment/Plan        Discussed general issues about diabetes pathophysiology and management.  Addressed ADA diet.  Continued statin drug see medication orders.  Follow up in 3 months or as needed.    Magnolia was seen today for follow-up, hyperlipidemia and diabetes.    Diagnoses and all orders for this visit:    Type 2 diabetes " mellitus without complication, without long-term current use of insulin  -     MicroAlbumin, Urine, Random; Future  -     Basic Metabolic Panel; Future  -     Hemoglobin A1c; Future    Mixed hyperlipidemia  -     Lipid Panel; Future  -     ezetimibe (ZETIA) 10 MG tablet; Take 1 tablet by mouth Daily. Indications: Elevation of Both Cholesterol and Triglycerides in Blood  -     simvastatin (ZOCOR) 20 MG tablet; Take 1 tablet by mouth Every Night.    Recurrent major depressive disorder, in full remission    Panic attack  -     ALPRAZolam (XANAX) 0.5 MG tablet; Take 1 tablet by mouth At Night As Needed for Anxiety. Indications: Feeling Anxious    Medication monitoring encounter  -     ALT; Future        Return in about 3 months (around 12/15/2017) for diabetes.  There are no Patient Instructions on file for this visit.  Medications Discontinued During This Encounter   Medication Reason   • RESTASIS 0.05 % ophthalmic emulsion Therapy completed   • ALPRAZolam (XANAX) 0.25 MG tablet Reorder   • ezetimibe (ZETIA) 10 MG tablet Reorder   • simvastatin (ZOCOR) 20 MG tablet Reorder         Dr. Nathen Bailey MD  [place], [place]  [facility].

## 2017-09-20 ENCOUNTER — RESULTS ENCOUNTER (OUTPATIENT)
Dept: FAMILY MEDICINE CLINIC | Facility: CLINIC | Age: 82
End: 2017-09-20

## 2017-09-20 DIAGNOSIS — E11.9 TYPE 2 DIABETES MELLITUS WITHOUT COMPLICATION, WITHOUT LONG-TERM CURRENT USE OF INSULIN (HCC): ICD-10-CM

## 2017-09-20 DIAGNOSIS — Z51.81 MEDICATION MONITORING ENCOUNTER: ICD-10-CM

## 2017-09-20 DIAGNOSIS — E78.2 MIXED HYPERLIPIDEMIA: ICD-10-CM

## 2017-09-22 LAB
ALT SERPL-CCNC: 14 U/L (ref 5–33)
BUN SERPL-MCNC: 21 MG/DL (ref 8–23)
BUN/CREAT SERPL: 20.4 (ref 7–25)
CALCIUM SERPL-MCNC: 9.5 MG/DL (ref 8.8–10.5)
CHLORIDE SERPL-SCNC: 103 MMOL/L (ref 98–107)
CHOLEST SERPL-MCNC: 160 MG/DL (ref 0–200)
CO2 SERPL-SCNC: 27.7 MMOL/L (ref 22–29)
CREAT SERPL-MCNC: 1.03 MG/DL (ref 0.57–1)
GLUCOSE SERPL-MCNC: 125 MG/DL (ref 65–99)
HBA1C MFR BLD: 6.5 % (ref 4.8–5.6)
HDLC SERPL-MCNC: 41 MG/DL (ref 40–60)
LDLC SERPL CALC-MCNC: 100 MG/DL (ref 0–100)
MICROALBUMIN UR-MCNC: 34 UG/ML
POTASSIUM SERPL-SCNC: 4.9 MMOL/L (ref 3.5–5.2)
SODIUM SERPL-SCNC: 141 MMOL/L (ref 136–145)
TRIGL SERPL-MCNC: 93 MG/DL (ref 0–150)
VLDLC SERPL CALC-MCNC: 18.6 MG/DL (ref 7–27)

## 2017-11-06 ENCOUNTER — OFFICE VISIT (OUTPATIENT)
Dept: ORTHOPEDIC SURGERY | Facility: CLINIC | Age: 82
End: 2017-11-06

## 2017-11-06 DIAGNOSIS — R52 PAIN: Primary | ICD-10-CM

## 2017-11-06 DIAGNOSIS — E78.2 MIXED HYPERLIPIDEMIA: Primary | ICD-10-CM

## 2017-11-06 DIAGNOSIS — Z51.81 MEDICATION MONITORING ENCOUNTER: ICD-10-CM

## 2017-11-06 DIAGNOSIS — M47.812 DJD (DEGENERATIVE JOINT DISEASE), CERVICAL: ICD-10-CM

## 2017-11-06 DIAGNOSIS — R53.83 FATIGUE, UNSPECIFIED TYPE: ICD-10-CM

## 2017-11-06 DIAGNOSIS — S60.212S CONTUSION OF LEFT WRIST, SEQUELA: ICD-10-CM

## 2017-11-06 DIAGNOSIS — E11.9 TYPE 2 DIABETES MELLITUS WITHOUT COMPLICATION, WITHOUT LONG-TERM CURRENT USE OF INSULIN (HCC): ICD-10-CM

## 2017-11-06 PROCEDURE — 72040 X-RAY EXAM NECK SPINE 2-3 VW: CPT | Performed by: ORTHOPAEDIC SURGERY

## 2017-11-06 PROCEDURE — 99214 OFFICE O/P EST MOD 30 MIN: CPT | Performed by: ORTHOPAEDIC SURGERY

## 2017-11-06 RX ORDER — HYDROCODONE BITARTRATE AND ACETAMINOPHEN 5; 325 MG/1; MG/1
1 TABLET ORAL EVERY 6 HOURS PRN
Qty: 24 TABLET | Refills: 0 | Status: SHIPPED | OUTPATIENT
Start: 2017-11-06 | End: 2019-02-06

## 2017-11-06 NOTE — PROGRESS NOTES
Subjective: Left upper extremity pain     Patient ID: Magnolia Rios is a 83 y.o. female.    Chief Complaint:    History of Present Illness 83-year-old female is seen once again for left upper extremity pain.  Was originally injured last April she fell down some steps at her home and had at that time wrist pain now she is having upper arm pain that starts mid arm down to the forearm and into the hand involving the ulnar nerve distribution.  Pain is quite severe not related to movement of the elbow or the wrist but does have pain with rotation of her hand.  This is persisted for the past several months that his on a daily basis she describes the pain as 8 or 9 out of 10.       Social History     Occupational History   •  Retired     Social History Main Topics   • Smoking status: Former Smoker     Types: Cigarettes     Quit date: 9/7/1989   • Smokeless tobacco: Never Used   • Alcohol use 1.2 oz/week     2 Glasses of wine per week      Comment: occ   • Drug use: No   • Sexual activity: No      Review of Systems   Constitutional: Negative for chills, diaphoresis, fever and unexpected weight change.   HENT: Negative for hearing loss, nosebleeds, sore throat and tinnitus.    Eyes: Negative for pain and visual disturbance.   Respiratory: Negative for cough, shortness of breath and wheezing.    Cardiovascular: Negative for chest pain and palpitations.   Gastrointestinal: Negative for abdominal pain, diarrhea, nausea and vomiting.   Endocrine: Negative for cold intolerance, heat intolerance and polydipsia.   Genitourinary: Negative for difficulty urinating, dysuria and hematuria.   Musculoskeletal: Positive for arthralgias. Negative for joint swelling and myalgias.   Skin: Negative for rash and wound.   Allergic/Immunologic: Negative for environmental allergies.   Neurological: Negative for dizziness, syncope and numbness.   Hematological: Does not bruise/bleed easily.   Psychiatric/Behavioral: Negative for dysphoric mood  and sleep disturbance. The patient is not nervous/anxious.    All other systems reviewed and are negative.        Past Medical History:   Diagnosis Date   • Depression    • Diabetes mellitus    • Heart palpitations    • Hyperlipidemia    • Panic attack      Past Surgical History:   Procedure Laterality Date   • COLONOSCOPY     • HYSTERECTOMY       Family History   Problem Relation Age of Onset   • No Known Problems Mother    • Seizures Father          Objective:  There were no vitals filed for this visit.  There were no vitals filed for this visit.  There is no height or weight on file to calculate BMI.       Ortho Exam  AP and lateral cervical spine to evaluate her chief complain shows extensive degenerative changes particularly at C3 through C7.  No acute changes are noted.  No prior x-rays are comparison.  She is alert and oriented ×3.  Head is normocephalic and pupils equal round reactive to light sclerae clear.  She has mild discomfort with extension of the neck and with left lateral bending and rotation radicular pain into the arm.  Negative Spurling's test.  Distal motor function far as radial ulnar median nerve function is intact.  Again she complains of decreased sensation of paresthesia in the little ring finger.  She has full range of motion of the elbow and the wrist.  Abduction and extension of the shoulder against resistance is intact causing no pain.  Mild discomfort internal rotation.  Negative Atwater's test.  Deltoid function is 5 over 5.  Biceps function is 5 over 5.  She has been taking ibuprofen with no GI side effects but no decrease in her symptoms or pain.  She describes the pain is quite disabling interfering with all and any activities of daily living.    Assessment:       1. Pain    2. DJD (degenerative joint disease), cervical    3. Contusion of left wrist, sequela          Plan:      reviewed the x-ray findings of the neck and physical findings with the patient.  We'll proceed with an  MRI of the neck to determine for dealing with a cervical impingement or encroachment.  Return after completion of the test.      Work Status:    KATHRYN query complete.    Orders:  Orders Placed This Encounter   Procedures   • XR Spine Cervical 2 View   • MRI Cervical Spine Without Contrast       Medications:  No orders of the defined types were placed in this encounter.      Followup:  Return in about 2 weeks (around 11/20/2017).          Dragon transcription disclaimer     Much of this encounter note is an electronic transcription/translation of spoken language to printed text. The electronic translation of spoken language may permit erroneous, or at times, nonsensical words or phrases to be inadvertently transcribed. Although I have reviewed the note for such errors, some may still exist.

## 2017-11-10 ENCOUNTER — HOSPITAL ENCOUNTER (OUTPATIENT)
Dept: MRI IMAGING | Facility: HOSPITAL | Age: 82
Discharge: HOME | End: 2017-11-10
Attending: ORTHOPAEDIC SURGERY | Admitting: ORTHOPAEDIC SURGERY

## 2017-11-10 DIAGNOSIS — R52 PAIN: ICD-10-CM

## 2017-11-10 DIAGNOSIS — M47.812 DJD (DEGENERATIVE JOINT DISEASE), CERVICAL: ICD-10-CM

## 2017-11-10 PROCEDURE — 72141 MRI NECK SPINE W/O DYE: CPT

## 2017-11-13 ENCOUNTER — OFFICE VISIT (OUTPATIENT)
Dept: ORTHOPEDIC SURGERY | Facility: CLINIC | Age: 82
End: 2017-11-13

## 2017-11-13 DIAGNOSIS — S60.212S CONTUSION OF LEFT WRIST, SEQUELA: ICD-10-CM

## 2017-11-13 DIAGNOSIS — R52 PAIN: Primary | ICD-10-CM

## 2017-11-13 DIAGNOSIS — M47.812 DJD (DEGENERATIVE JOINT DISEASE), CERVICAL: ICD-10-CM

## 2017-11-13 PROCEDURE — 99213 OFFICE O/P EST LOW 20 MIN: CPT | Performed by: ORTHOPAEDIC SURGERY

## 2017-11-13 NOTE — PROGRESS NOTES
Subjective: Cervical pain     Patient ID: Magnolia Rios is a 83 y.o. female.    Chief Complaint:    History of Present Illness 83-year-old female returns results of the MRI of the neck which I personally reviewed and review the results of with the patient.  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  She reports no significant reduction of the pain discomfort with the ibuprofen that she has been taking.       Social History     Occupational History   •  Retired     Social History Main Topics   • Smoking status: Former Smoker     Types: Cigarettes     Quit date: 9/7/1989   • Smokeless tobacco: Never Used   • Alcohol use 1.2 oz/week     2 Glasses of wine per week      Comment: occ   • Drug use: No   • Sexual activity: No      Review of Systems   Constitutional: Negative for chills, diaphoresis, fever and unexpected weight change.   HENT: Negative for hearing loss, nosebleeds, sore throat and tinnitus.    Eyes: Negative for pain and visual disturbance.   Respiratory: Negative for cough, shortness of breath and wheezing.    Cardiovascular: Negative for chest pain and palpitations.   Gastrointestinal: Negative for abdominal pain, diarrhea, nausea and vomiting.   Endocrine: Negative for cold intolerance, heat intolerance and polydipsia.   Genitourinary: Negative for difficulty urinating, dysuria and hematuria.   Musculoskeletal: Positive for arthralgias. Negative for joint swelling and myalgias.   Skin: Negative for rash and wound.   Allergic/Immunologic: Negative for environmental allergies.   Neurological: Negative for dizziness, syncope and numbness.   Hematological: Does not bruise/bleed easily.   Psychiatric/Behavioral: Negative for dysphoric mood and sleep disturbance. The patient is not nervous/anxious.    All other systems reviewed and are negative.        Past Medical History:   Diagnosis Date   • Depression    • Diabetes mellitus    • Heart palpitations    • Hyperlipidemia    • Panic attack      Past  Surgical History:   Procedure Laterality Date   • COLONOSCOPY     • HYSTERECTOMY       Family History   Problem Relation Age of Onset   • No Known Problems Mother    • Seizures Father          Objective:  There were no vitals filed for this visit.  There were no vitals filed for this visit.  There is no height or weight on file to calculate BMI.       Ortho Exam  she is alert and oriented ×3 today she has excellent range of motion of the neck with minimal discomfort and no radiculopathy.  All the pain in that left upper extremity has resolved.  The skin is cool to touch.  Deltoid and biceps function is 5 over 5.  His no loss of reflexes.  Good capillary refill and no sensory loss.  She did tolerate an anti-inflammatory without any GI side effects    Assessment:       1. Pain    2. DJD (degenerative joint disease), cervical    3. Contusion of left wrist, sequela          Plan:      she continue taking the ibuprofen as needed.  Patient instructed that if at some point significant pain despite the ibuprofen to call and make appropriate referral to a back surgeon      Work Status:    KATHRYN query complete.    Orders:  No orders of the defined types were placed in this encounter.      Medications:  No orders of the defined types were placed in this encounter.      Followup:  Return if symptoms worsen or fail to improve.          Dragon transcription disclaimer     Much of this encounter note is an electronic transcription/translation of spoken language to printed text. The electronic translation of spoken language may permit erroneous, or at times, nonsensical words or phrases to be inadvertently transcribed. Although I have reviewed the note for such errors, some may still exist.

## 2018-01-23 LAB
ALT SERPL-CCNC: 14 U/L (ref 5–33)
APPEARANCE UR: CLEAR
BACTERIA #/AREA URNS HPF: ABNORMAL /HPF
BILIRUB UR QL STRIP: NEGATIVE
BUN SERPL-MCNC: 18 MG/DL (ref 8–23)
BUN/CREAT SERPL: 14.6 (ref 7–25)
CALCIUM SERPL-MCNC: 9.5 MG/DL (ref 8.8–10.5)
CHLORIDE SERPL-SCNC: 99 MMOL/L (ref 98–107)
CHOLEST SERPL-MCNC: 162 MG/DL (ref 0–200)
CO2 SERPL-SCNC: 30.1 MMOL/L (ref 22–29)
COLOR UR: YELLOW
CREAT SERPL-MCNC: 1.23 MG/DL (ref 0.57–1)
EPI CELLS #/AREA URNS HPF: ABNORMAL /HPF
ERYTHROCYTE [DISTWIDTH] IN BLOOD BY AUTOMATED COUNT: 13 % (ref 11.5–14.5)
GLUCOSE SERPL-MCNC: 132 MG/DL (ref 65–99)
GLUCOSE UR QL: NEGATIVE
HBA1C MFR BLD: 6.9 % (ref 4.8–5.6)
HCT VFR BLD AUTO: 41.9 % (ref 37–47)
HDLC SERPL-MCNC: 51 MG/DL (ref 40–60)
HGB BLD-MCNC: 14.1 G/DL (ref 12–16)
HGB UR QL STRIP: NEGATIVE
KETONES UR QL STRIP: NEGATIVE
LDLC SERPL CALC-MCNC: 84 MG/DL (ref 0–100)
LDLC/HDLC SERPL: 1.65 {RATIO}
LEUKOCYTE ESTERASE UR QL STRIP: ABNORMAL
MCH RBC QN AUTO: 30.5 PG (ref 27–31)
MCHC RBC AUTO-ENTMCNC: 33.7 G/DL (ref 31–37)
MCV RBC AUTO: 90.5 FL (ref 81–99)
MUCOUS THREADS URNS QL MICRO: ABNORMAL /HPF
NITRITE UR QL STRIP: NEGATIVE
PH UR STRIP: 7 [PH] (ref 4.5–8)
PLATELET # BLD AUTO: 270 10*3/MM3 (ref 140–500)
POTASSIUM SERPL-SCNC: 4.4 MMOL/L (ref 3.5–5.2)
PROT UR QL STRIP: NEGATIVE
RBC # BLD AUTO: 4.63 10*6/MM3 (ref 4.2–5.4)
RBC #/AREA URNS HPF: ABNORMAL /HPF
SODIUM SERPL-SCNC: 140 MMOL/L (ref 136–145)
SP GR UR: 1.01 (ref 1–1.03)
TRIGL SERPL-MCNC: 134 MG/DL (ref 0–150)
TSH SERPL DL<=0.005 MIU/L-ACNC: 5.5 MIU/ML (ref 0.27–4.2)
UROBILINOGEN UR STRIP-MCNC: ABNORMAL MG/DL
VLDLC SERPL CALC-MCNC: 26.8 MG/DL (ref 7–27)
WBC # BLD AUTO: 6.47 10*3/MM3 (ref 4.8–10.8)
WBC #/AREA URNS HPF: ABNORMAL /HPF

## 2018-01-25 ENCOUNTER — OFFICE VISIT (OUTPATIENT)
Dept: ORTHOPEDIC SURGERY | Facility: CLINIC | Age: 83
End: 2018-01-25

## 2018-01-25 VITALS — HEIGHT: 68 IN | WEIGHT: 176 LBS | BODY MASS INDEX: 26.67 KG/M2

## 2018-01-25 DIAGNOSIS — M17.0 PRIMARY OSTEOARTHRITIS OF BOTH KNEES: ICD-10-CM

## 2018-01-25 DIAGNOSIS — M25.562 PAIN IN BOTH KNEES, UNSPECIFIED CHRONICITY: Primary | ICD-10-CM

## 2018-01-25 DIAGNOSIS — M25.561 PAIN IN BOTH KNEES, UNSPECIFIED CHRONICITY: Primary | ICD-10-CM

## 2018-01-25 PROCEDURE — 73562 X-RAY EXAM OF KNEE 3: CPT | Performed by: ORTHOPAEDIC SURGERY

## 2018-01-25 PROCEDURE — 99214 OFFICE O/P EST MOD 30 MIN: CPT | Performed by: ORTHOPAEDIC SURGERY

## 2018-01-25 NOTE — PROGRESS NOTES
Subjective ; bilateral knee pain      Patient ID: Magnolia Rios is a 83 y.o. female.    Chief ComplaiPain   Associated symptoms include joint swelling and myalgias. Pertinent negatives include no abdominal pain, arthralgias, chest pain, chills, coughing, diaphoresis, fever, nausea, numbness, rash, sore throat or vomiting.    83-year-old female known to me seen previously for other issues been seen today for the first time regarding bilateral knee pain that she's had for the past 7-10 days.  Dates the onset of the pain to shoveling snow and since that time she's had increasing difficulty particular the right knee with ambulating long distances.  However significant pain getting in a chair and navigating steps but just while ambulating for long periods of time.  He is tried several ibuprofen over-the-counter but nothing on a regular basis her long-term.  Denies any specific injury to the knees.        Social History     Occupational History   •  Retired     Social History Main Topics   • Smoking status: Former Smoker     Types: Cigarettes     Quit date: 9/7/1989   • Smokeless tobacco: Never Used   • Alcohol use 1.2 oz/week     2 Glasses of wine per week      Comment: occ   • Drug use: No   • Sexual activity: No      Review of Systems   Constitutional: Negative for chills, diaphoresis, fever and unexpected weight change.   HENT: Negative for hearing loss, nosebleeds, sore throat and tinnitus.    Eyes: Negative for pain and visual disturbance.   Respiratory: Negative for cough, shortness of breath and wheezing.    Cardiovascular: Negative for chest pain and palpitations.   Gastrointestinal: Negative for abdominal pain, diarrhea, nausea and vomiting.   Endocrine: Negative for cold intolerance, heat intolerance and polydipsia.   Genitourinary: Negative for difficulty urinating, dysuria and hematuria.   Musculoskeletal: Positive for joint swelling and myalgias. Negative for arthralgias.   Skin: Negative for rash and  wound.   Allergic/Immunologic: Negative for environmental allergies.   Neurological: Negative for dizziness, syncope and numbness.   Hematological: Does not bruise/bleed easily.   Psychiatric/Behavioral: Negative for dysphoric mood and sleep disturbance. The patient is not nervous/anxious.          Past Medical History:   Diagnosis Date   • Depression    • Diabetes mellitus    • Heart palpitations    • Hyperlipidemia    • Panic attack      Past Surgical History:   Procedure Laterality Date   • COLONOSCOPY     • HYSTERECTOMY       Family History   Problem Relation Age of Onset   • No Known Problems Mother    • Seizures Father          Objective:  There were no vitals filed for this visit.  Last 3 weights    01/25/18  1324   Weight: 79.8 kg (176 lb)     Body mass index is 26.76 kg/(m^2).       Ortho Exam AP lateral sunrise view of both knees to evaluate her chief complain shows tricompartmental degenerative arthritis but no acute changes noted.  No prior x-rays for comparison.  She is alert and oriented ×3.  Right knee shows no swelling effusion erythema she has 0-120° of motion with crepitus and pain but no subluxation of the patella and no instability at 0 90°.  Quad function 5 over 5 and a calves nontender negative Homans.  Skin is cool to touch and his no sensory loss.  The left knee shows no swelling effusion erythema.  She has 0 120° of motion with crepitus but no pain or discomfort throughout the arc of motion no patella subluxation.  Quad function 5/5 calf is nontender with a negative Homans.  Skin is cool to touch.  She has good capillary refill in both legs.  She is tolerating ibuprofen but taking subtherapeutic dosage but having no GI side effects      Assessment:       1. Pain in both knees, unspecified chronicity    2. Primary osteoarthritis of both knees          Plan:      Over 15 minutes of the visit was spent reviewing the patient's x-rays and physical findings with her.  I believe she has  pre-existing arthritis was aggravated by her activity specifically the shoveling of snow.  She states prior to this she had minimal to no discomfort in the knees was able to perform all activities of daily living.  I recommend she get on 800 mg of ibuprofen twice a day with food.  She should take at least for the next 3 weeks if she's not a symptomatic at that time she is to return here for cortisone injection.  I assured her that visit this point is not a surgical problem and should respond to the medication either orally or via an injection.  I would modify her activity only until she is pain-free than she can resume all activities as she wishes       Work Status:    KATHRYN query complete.    Orders:  Orders Placed This Encounter   Procedures   • XR Knee 3 View Bilateral       Medications:  No orders of the defined types were placed in this encounter.      Followup:  Return in about 3 weeks (around 2/15/2018).          Dragon transcription disclaimer     Much of this encounter note is an electronic transcription/translation of spoken language to printed text. The electronic translation of spoken language may permit erroneous, or at times, nonsensical words or phrases to be inadvertently transcribed. Although I have reviewed the note for such errors, some may still exist.

## 2018-01-29 RX ORDER — FLUOXETINE HYDROCHLORIDE 20 MG/1
CAPSULE ORAL
Qty: 90 CAPSULE | Refills: 1 | OUTPATIENT
Start: 2018-01-29

## 2018-01-30 ENCOUNTER — OFFICE VISIT (OUTPATIENT)
Dept: FAMILY MEDICINE CLINIC | Facility: CLINIC | Age: 83
End: 2018-01-30

## 2018-01-30 VITALS
TEMPERATURE: 97.6 F | HEART RATE: 72 BPM | HEIGHT: 68 IN | SYSTOLIC BLOOD PRESSURE: 146 MMHG | BODY MASS INDEX: 27.52 KG/M2 | WEIGHT: 181.6 LBS | DIASTOLIC BLOOD PRESSURE: 78 MMHG | OXYGEN SATURATION: 98 %

## 2018-01-30 DIAGNOSIS — E78.2 MIXED HYPERLIPIDEMIA: Primary | ICD-10-CM

## 2018-01-30 DIAGNOSIS — E11.9 TYPE 2 DIABETES MELLITUS WITHOUT COMPLICATION, WITHOUT LONG-TERM CURRENT USE OF INSULIN (HCC): ICD-10-CM

## 2018-01-30 DIAGNOSIS — F41.0 PANIC ATTACK: ICD-10-CM

## 2018-01-30 DIAGNOSIS — Z51.81 MEDICATION MONITORING ENCOUNTER: ICD-10-CM

## 2018-01-30 DIAGNOSIS — Z78.0 MENOPAUSE: ICD-10-CM

## 2018-01-30 DIAGNOSIS — F33.42 RECURRENT MAJOR DEPRESSIVE DISORDER, IN FULL REMISSION (HCC): ICD-10-CM

## 2018-01-30 PROBLEM — Z28.9 VACCINATION NOT CARRIED OUT: Status: ACTIVE | Noted: 2018-01-30

## 2018-01-30 PROBLEM — Z00.00 MEDICARE ANNUAL WELLNESS VISIT, SUBSEQUENT: Status: ACTIVE | Noted: 2018-01-30

## 2018-01-30 PROCEDURE — G0439 PPPS, SUBSEQ VISIT: HCPCS | Performed by: FAMILY MEDICINE

## 2018-01-30 PROCEDURE — 96160 PT-FOCUSED HLTH RISK ASSMT: CPT | Performed by: FAMILY MEDICINE

## 2018-01-30 RX ORDER — FLUOXETINE HYDROCHLORIDE 20 MG/1
20 CAPSULE ORAL DAILY
Qty: 90 CAPSULE | Refills: 3 | Status: SHIPPED | OUTPATIENT
Start: 2018-01-30 | End: 2019-01-27 | Stop reason: SDUPTHER

## 2018-01-30 NOTE — PROGRESS NOTES
QUICK REFERENCE INFORMATION:  The ABCs of the Annual Wellness Visit    Subsequent Medicare Wellness Visit    HEALTH RISK ASSESSMENT    1934    Recent Hospitalizations:  No hospitalization(s) within the last year..    Still out there shoveling snow down her [address] 'driveway.      Current Medical Providers:  Patient Care Team:  Nathen Bailey MD as PCP - General (Family Medicine)  Enrico Palacios MD as Surgeon (Orthopedic Surgery)        Smoking Status:  History   Smoking Status   • Former Smoker   • Types: Cigarettes   • Quit date: 9/7/1989   Smokeless Tobacco   • Never Used       Alcohol Consumption:  History   Alcohol Use   • 1.2 oz/week   • 2 Glasses of wine per week     Comment: occ       Depression Screen:   PHQ-2/PHQ-9 Depression Screening 1/30/2018   Little interest or pleasure in doing things 1   Feeling down, depressed, or hopeless 1   Trouble falling or staying asleep, or sleeping too much 0   Feeling tired or having little energy 0   Poor appetite or overeating 1   Feeling bad about yourself - or that you are a failure or have let yourself or your family down 1   Trouble concentrating on things, such as reading the newspaper or watching television 1   Moving or speaking so slowly that other people could have noticed. Or the opposite - being so fidgety or restless that you have been moving around a lot more than usual 1   Thoughts that you would be better off dead, or of hurting yourself in some way 1   Total Score 7   If you checked off any problems, how difficult have these problems made it for you to do your work, take care of things at home, or get along with other people? Somewhat difficult       Health Habits and Functional and Cognitive Screening:  Functional & Cognitive Status 1/30/2018   Do you have difficulty preparing food and eating? No   Do you have difficulty bathing yourself, getting dressed or grooming yourself? No   Do you have difficulty using the toilet? No   Do you have difficulty  moving around from place to place? Yes   Do you have trouble with steps or getting out of a bed or a chair? Yes   In the past year have you fallen or experienced a near fall? Yes   Current Diet Unhealthy Diet   Dental Exam Up to date   Eye Exam Up to date   Exercise (times per week) 0 times per week   Current Exercise Activities Include None   Do you need help using the phone?  No   Are you deaf or do you have serious difficulty hearing?  No   Do you need help with transportation? No   Do you need help shopping? No   Do you need help preparing meals?  No   Do you need help with housework?  No   Do you need help with laundry? No   Do you need help taking your medications? No   Do you need help managing money? No   Have you felt unusual stress, anger or loneliness in the last month? Yes   Who do you live with? Spouse   If you need help, do you have trouble finding someone available to you? No   Have you been bothered in the last four weeks by sexual problems? No   Do you have difficulty concentrating, remembering or making decisions? Yes           Does the patient have evidence of cognitive impairment? No    Aspirin use counseling: Does not need ASA (and currently is not on it)      Recent Lab Results:  CMP:  Lab Results   Component Value Date     (H) 01/23/2018    BUN 18 01/23/2018    CREATININE 1.23 (H) 01/23/2018    EGFRIFNONA 42 (L) 01/23/2018    EGFRIFAFRI 51 (L) 01/23/2018    BCR 14.6 01/23/2018     01/23/2018    K 4.4 01/23/2018    CO2 30.1 (H) 01/23/2018    CALCIUM 9.5 01/23/2018    ALBUMIN 4.00 03/27/2017    LABIL2 1.5 03/27/2017    BILITOT 0.7 03/27/2017    ALKPHOS 77 03/27/2017    AST 36 (H) 03/27/2017    ALT 14 01/23/2018     Lipid Panel:  Lab Results   Component Value Date    TRIG 134 01/23/2018    HDL 51 01/23/2018    VLDL 26.8 01/23/2018    LDLHDL 1.65 01/23/2018     HbA1c:  Lab Results   Component Value Date    HGBA1C 6.90 (H) 01/23/2018       Visual Acuity:  No exam data  present    Age-appropriate Screening Schedule:  Refer to the list below for future screening recommendations based on patient's age, sex and/or medical conditions. Orders for these recommended tests are listed in the plan section. The patient has been provided with a written plan.    Health Maintenance   Topic Date Due   • DIABETIC FOOT EXAM  05/16/2017   • DIABETIC EYE EXAM  05/31/2018   • HEMOGLOBIN A1C  07/23/2018   • URINE MICROALBUMIN  09/21/2018   • LIPID PANEL  01/23/2019   • INFLUENZA VACCINE  Addressed   • ZOSTER VACCINE  Addressed   • PNEUMOCOCCAL VACCINES (65+ LOW/MEDIUM RISK)  Excluded   • TDAP/TD VACCINES  Excluded        Subjective   History of Present Illness    Magnolia Rios is a 83 y.o. female who presents for an Subsequent Wellness Visit.    The following portions of the patient's history were reviewed and updated as appropriate: allergies, current medications, past family history, past medical history, past social history, past surgical history and problem list.    Outpatient Medications Prior to Visit   Medication Sig Dispense Refill   • ALPRAZolam (XANAX) 0.5 MG tablet Take 1 tablet by mouth At Night As Needed for Anxiety. Indications: Feeling Anxious 30 tablet 1   • cholecalciferol 2000 UNITS tablet Take 2,000 Units by mouth Daily. 30 tablet 1   • ezetimibe (ZETIA) 10 MG tablet Take 1 tablet by mouth Daily. Indications: Elevation of Both Cholesterol and Triglycerides in Blood 90 tablet 3   • HYDROcodone-acetaminophen (NORCO) 5-325 MG per tablet Take 1 tablet by mouth Every 6 (Six) Hours As Needed for Moderate Pain  or Severe Pain . 24 tablet 0   • ibuprofen (ADVIL,MOTRIN) 800 MG tablet Take 800 mg by mouth Every 6 (Six) Hours As Needed for mild pain (1-3).     • Multiple Vitamins-Minerals (MULTIVITAMIN WITH MINERALS) tablet tablet Take 1 tablet by mouth Daily.     • ranitidine (ZANTAC) 150 MG tablet Take one tablet by mouth twice daily 60 tablet 0   • simvastatin (ZOCOR) 20 MG tablet Take 1  "tablet by mouth Every Night. 90 tablet 3   • vitamin C (ASCORBIC ACID) 250 MG tablet Take 250 mg by mouth Daily.     • FLUoxetine (PROzac) 20 MG capsule Take 1 capsule by mouth Daily. 90 capsule 1     No facility-administered medications prior to visit.        Patient Active Problem List   Diagnosis   • Postural dizziness   • Panic attack   • Mixed hyperlipidemia   • Heart palpitations   • Type 2 diabetes mellitus without complication, without long-term current use of insulin   • Recurrent major depressive disorder, in full remission   • Fracture of distal end of right radius with routine healing   • Menopause   • Medication monitoring encounter   • Medicare annual wellness visit, subsequent   • Vaccination not carried out       Advance Care Planning:  has an advance directive - a copy HAS NOT been provided    Identification of Risk Factors:  Risk factors include: increased fall risk.    Review of Systems    Compared to one year ago, the patient feels her physical health is the same.  Compared to one year ago, the patient feels her mental health is the same.    Objective     Physical Exam   Constitutional: She appears well-developed and well-nourished.   Cardiovascular: Normal rate.    Pulmonary/Chest: Effort normal.   Musculoskeletal: She exhibits tenderness.   Neurological: She is alert.   Psychiatric: She has a normal mood and affect.   Nursing note and vitals reviewed.      Vitals:    01/30/18 1032   BP: 146/78   BP Location: Left arm   Patient Position: Sitting   Pulse: 72   Temp: 97.6 °F (36.4 °C)   SpO2: 98%   Weight: 82.4 kg (181 lb 9.6 oz)   Height: 172.7 cm (68\")   PainSc: 0-No pain       Body mass index is 27.61 kg/(m^2).  Discussed the patient's BMI with her. BMI is within normal parameters. No follow-up required.    Assessment/Plan   Patient Self-Management and Personalized Health Advice  The patient has been provided with information about: prevention of cardiac or vascular disease and fall prevention " and preventive services including:   · Diabetes screening, see lab orders.    Visit Diagnoses:    ICD-10-CM ICD-9-CM   1. Mixed hyperlipidemia E78.2 272.2   2. Type 2 diabetes mellitus without complication, without long-term current use of insulin E11.9 250.00   3. Recurrent major depressive disorder, in full remission F33.42 296.36   4. Panic attack F41.0 300.01   5. Medication monitoring encounter Z51.81 V58.83   6. Menopause Z78.0 627.2       Orders Placed This Encounter   Procedures   • TSH     Standing Status:   Future     Standing Expiration Date:   1/31/2019   • ALT     Standing Status:   Future     Standing Expiration Date:   1/31/2019   • Hemoglobin A1c     Standing Status:   Future       Outpatient Encounter Prescriptions as of 1/30/2018   Medication Sig Dispense Refill   • ALPRAZolam (XANAX) 0.5 MG tablet Take 1 tablet by mouth At Night As Needed for Anxiety. Indications: Feeling Anxious 30 tablet 1   • cholecalciferol 2000 UNITS tablet Take 2,000 Units by mouth Daily. 30 tablet 1   • ezetimibe (ZETIA) 10 MG tablet Take 1 tablet by mouth Daily. Indications: Elevation of Both Cholesterol and Triglycerides in Blood 90 tablet 3   • FLUoxetine (PROzac) 20 MG capsule Take 1 capsule by mouth Daily. 90 capsule 3   • HYDROcodone-acetaminophen (NORCO) 5-325 MG per tablet Take 1 tablet by mouth Every 6 (Six) Hours As Needed for Moderate Pain  or Severe Pain . 24 tablet 0   • ibuprofen (ADVIL,MOTRIN) 800 MG tablet Take 800 mg by mouth Every 6 (Six) Hours As Needed for mild pain (1-3).     • Multiple Vitamins-Minerals (MULTIVITAMIN WITH MINERALS) tablet tablet Take 1 tablet by mouth Daily.     • ranitidine (ZANTAC) 150 MG tablet Take one tablet by mouth twice daily 60 tablet 0   • simvastatin (ZOCOR) 20 MG tablet Take 1 tablet by mouth Every Night. 90 tablet 3   • vitamin C (ASCORBIC ACID) 250 MG tablet Take 250 mg by mouth Daily.     • [DISCONTINUED] FLUoxetine (PROzac) 20 MG capsule Take 1 capsule by mouth Daily.  90 capsule 1     No facility-administered encounter medications on file as of 1/30/2018.        Reviewed use of high risk medication in the elderly: not applicable  Reviewed for potential of harmful drug interactions in the elderly: not applicable    TSH is trending up  Reviewed ortho Dr Palacios notes.  Does not want any vaccinations  Chol is ok  Diet controlled diabetes ok.  Not sure if she wants a Mammo  dexa next yr.      Follow Up:  Return in about 6 months (around 7/30/2018).     An After Visit Summary and PPPS with all of these plans were given to the patient.

## 2018-02-26 ENCOUNTER — OFFICE VISIT (OUTPATIENT)
Dept: ORTHOPEDIC SURGERY | Facility: CLINIC | Age: 83
End: 2018-02-26

## 2018-02-26 DIAGNOSIS — R52 PAIN: Primary | ICD-10-CM

## 2018-02-26 DIAGNOSIS — M17.0 PRIMARY OSTEOARTHRITIS OF BOTH KNEES: ICD-10-CM

## 2018-02-26 PROCEDURE — 99213 OFFICE O/P EST LOW 20 MIN: CPT | Performed by: ORTHOPAEDIC SURGERY

## 2018-02-26 PROCEDURE — 20610 DRAIN/INJ JOINT/BURSA W/O US: CPT | Performed by: ORTHOPAEDIC SURGERY

## 2018-02-26 RX ORDER — LIDOCAINE HYDROCHLORIDE 10 MG/ML
8 INJECTION, SOLUTION EPIDURAL; INFILTRATION; INTRACAUDAL; PERINEURAL
Status: COMPLETED | OUTPATIENT
Start: 2018-02-26 | End: 2018-02-26

## 2018-02-26 RX ORDER — TRIAMCINOLONE ACETONIDE 40 MG/ML
40 INJECTION, SUSPENSION INTRA-ARTICULAR; INTRAMUSCULAR
Status: COMPLETED | OUTPATIENT
Start: 2018-02-26 | End: 2018-02-26

## 2018-02-26 RX ADMIN — LIDOCAINE HYDROCHLORIDE 8 ML: 10 INJECTION, SOLUTION EPIDURAL; INFILTRATION; INTRACAUDAL; PERINEURAL at 09:49

## 2018-02-26 RX ADMIN — TRIAMCINOLONE ACETONIDE 40 MG: 40 INJECTION, SUSPENSION INTRA-ARTICULAR; INTRAMUSCULAR at 09:49

## 2018-02-26 NOTE — PROGRESS NOTES
Subjective: Right knee pain     Patient ID: Magnolia Rios is a 83 y.o. female.    Chief Complaint:    History of Present Illness 83-year-old female returns with persistent pain and discomfort in the right knee despite taking ibuprofen 800 mg 3 times a day.  Noted slight improvement today but up until today but significant discomfort with no real improvement.  Still having pain with all activities of daily living.       Social History     Occupational History   •  Retired     Social History Main Topics   • Smoking status: Former Smoker     Types: Cigarettes     Quit date: 9/7/1989   • Smokeless tobacco: Never Used   • Alcohol use 1.2 oz/week     2 Glasses of wine per week      Comment: occ   • Drug use: No   • Sexual activity: Yes     Partners: Male     Birth control/ protection: Post-menopausal      Review of Systems   Constitutional: Negative for chills, diaphoresis, fever and unexpected weight change.   HENT: Negative for hearing loss, nosebleeds, sore throat and tinnitus.    Eyes: Negative for pain and visual disturbance.   Respiratory: Negative for cough, shortness of breath and wheezing.    Cardiovascular: Negative for chest pain and palpitations.   Gastrointestinal: Negative for abdominal pain, diarrhea, nausea and vomiting.   Endocrine: Negative for cold intolerance, heat intolerance and polydipsia.   Genitourinary: Negative for difficulty urinating, dysuria and hematuria.   Musculoskeletal: Positive for arthralgias. Negative for joint swelling and myalgias.   Skin: Negative for rash and wound.   Allergic/Immunologic: Negative for environmental allergies.   Neurological: Negative for dizziness, syncope and numbness.   Hematological: Does not bruise/bleed easily.   Psychiatric/Behavioral: Negative for dysphoric mood and sleep disturbance. The patient is not nervous/anxious.          Past Medical History:   Diagnosis Date   • Depression    • Diabetes mellitus    • Heart palpitations    • Hyperlipidemia    •  Panic attack      Past Surgical History:   Procedure Laterality Date   • COLONOSCOPY     • HYSTERECTOMY       Family History   Problem Relation Age of Onset   • No Known Problems Mother    • Seizures Father          Objective:  There were no vitals filed for this visit.  There were no vitals filed for this visit.  There is no height or weight on file to calculate BMI.       Ortho Exam  she is alert and oriented ×3.  Right knee shows no swelling effusion erythema.  She has 0-125° of motion with moderate pain and crepitus throughout the arc of motion but no instability at 0 90° no patella subluxation.  She has good distal pulses no motor deficit no sensory loss.  The skin is cool to touch.  Quad function 5 over 5.  Calf is nontender negative Homans.  Tolerating the ibuprofen 3 times a day without any GI side effects but no significant decrease in her pain and discomfort.    Assessment:       1. Pain    2. Primary osteoarthritis of both knees          Plan: Spent over tenderness with the patient discussing treatment options with her at this point.  Discussed change in the nonsteroidal anti-inflammatories, cortisone injection, physical therapy or bracing.  After reviewing all the options were then proceed with continuation of the ibuprofen 800 3 times a day and will follow with a cortisone injection.  That was given through the superolateral portal about sterile prepping with 8 cc lidocaine 1 cc Kenalog.  Postinjection instructions given to the patient.  Return to see me in 6 weeks if she is not shown significant improvement we'll consider viscus supplement injections.  Discussed this treatment plan with the patient and she was in agreement.  Large Joint Arthrocentesis  Date/Time: 2/26/2018 9:49 AM  Supporting Documentation  Indications: pain   Procedure Details  Location: knee - R knee  Medications administered: 8 mL lidocaine PF 1% 1 %; 40 mg triamcinolone acetonide 40 MG/ML                  Work Status:    KATHRYN  query complete.    Orders:  Orders Placed This Encounter   Procedures   • Large Joint Arthrocentesis       Medications:  No orders of the defined types were placed in this encounter.      Followup:  Return in about 6 weeks (around 4/9/2018).          Dragon transcription disclaimer     Much of this encounter note is an electronic transcription/translation of spoken language to printed text. The electronic translation of spoken language may permit erroneous, or at times, nonsensical words or phrases to be inadvertently transcribed. Although I have reviewed the note for such errors, some may still exist.

## 2018-03-23 ENCOUNTER — DOCUMENTATION (OUTPATIENT)
Dept: ORTHOPEDIC SURGERY | Facility: CLINIC | Age: 83
End: 2018-03-23

## 2018-03-23 NOTE — PROGRESS NOTES
To Whom It May Concern            Ms. Magnolia Rios is currently a patient of mine in under my care for end-stage degenerative arthritis of the right knee which makes all activities of daily living very difficult and very painful.  Patient is currently on medication is undergone treatment for her arthritic knee elbow and to status failed to respond to those treatments and is still having significant limitations in his far as ability to move and to ambulate.  It is my professional opinion that the patient should be excused from jury duty is able to oppose unnecessary pain to the patient.  Again he other questions regarding this patient or her medical condition please for free to contact this office.        Sincerely yours,        Enrico Palacios M.D.

## 2018-04-11 ENCOUNTER — OFFICE VISIT (OUTPATIENT)
Dept: ORTHOPEDIC SURGERY | Facility: CLINIC | Age: 83
End: 2018-04-11

## 2018-04-11 DIAGNOSIS — M17.0 PRIMARY OSTEOARTHRITIS OF BOTH KNEES: Primary | ICD-10-CM

## 2018-04-11 PROCEDURE — 99213 OFFICE O/P EST LOW 20 MIN: CPT | Performed by: ORTHOPAEDIC SURGERY

## 2018-04-11 RX ORDER — MELOXICAM 7.5 MG/1
7.5 TABLET ORAL DAILY
Qty: 30 TABLET | Refills: 5 | Status: SHIPPED | OUTPATIENT
Start: 2018-04-11 | End: 2019-06-04 | Stop reason: SDUPTHER

## 2018-04-11 NOTE — PROGRESS NOTES
Subjective: Bilateral knee pain left worse than right     Patient ID: Magnolia Rios is a 83 y.o. female.    Chief Complaint:    History of Present Illness patient returns with significant pain in the left leg today the right knee is relatively asymptomatic but for the past several weeks centigrade and difficulty with regard to the left leg getting in and out of a chair and navigating steps not having significant pain walking on level ground.  His been taking 600 of ibuprofen once to twice a day without significant relief.  Again the right knee today after the cortisone injection given on the previous visit is doing much better.       Social History     Occupational History   •  Retired     Social History Main Topics   • Smoking status: Former Smoker     Types: Cigarettes     Quit date: 9/7/1989   • Smokeless tobacco: Never Used   • Alcohol use 1.2 oz/week     2 Glasses of wine per week      Comment: occ   • Drug use: No   • Sexual activity: Yes     Partners: Male     Birth control/ protection: Post-menopausal      Review of Systems   Constitutional: Negative for chills, diaphoresis, fever and unexpected weight change.   HENT: Negative for hearing loss, nosebleeds, sore throat and tinnitus.    Eyes: Negative for pain and visual disturbance.   Respiratory: Negative for cough, shortness of breath and wheezing.    Cardiovascular: Negative for chest pain and palpitations.   Gastrointestinal: Negative for abdominal pain, diarrhea, nausea and vomiting.   Endocrine: Negative for cold intolerance, heat intolerance and polydipsia.   Genitourinary: Negative for difficulty urinating, dysuria and hematuria.   Musculoskeletal: Positive for arthralgias. Negative for joint swelling and myalgias.   Skin: Negative for rash and wound.   Allergic/Immunologic: Negative for environmental allergies.   Neurological: Negative for dizziness, syncope and numbness.   Hematological: Does not bruise/bleed easily.   Psychiatric/Behavioral:  Negative for dysphoric mood and sleep disturbance. The patient is not nervous/anxious.    All other systems reviewed and are negative.          Objective:     Ortho Exam  she is alert and oriented ×3.  The left leg shows no motor deficit no sensory loss.  She has a negative Stinchfield test.  His no pain with passive internal/external rotation of the hip injection is a negative straight leg raise.  Quad function 5 over 5 in her calf remains nontender.  There is moderate crepitus and pain with range of motion but there is no instability in the skin is cool to touch.  There is no instability to the knee no patella subluxation.  Tolerating the ibuprofen without any GI side effects but again she is taken a subtherapeutic dosage.  She describes the pain is quite disabling interfering with all activities of daily living.    Assessment:       1. Primary osteoarthritis of both knees          Plan:        Reviewed treatment options with the patient.  I believe the pain in that left leg is arthritic in nature.  She has no evidence of nerve injury long track signs.  Do not believe the pain is coming from her hip believe is coming from the knee.  We'll start her on a therapeutic dosage of an anti-inflammatory prescribed meloxicam 7.5 daily.  Return in a month to ensure that she is doing better.

## 2018-05-09 ENCOUNTER — OFFICE VISIT (OUTPATIENT)
Dept: ORTHOPEDIC SURGERY | Facility: CLINIC | Age: 83
End: 2018-05-09

## 2018-05-09 VITALS — WEIGHT: 175 LBS | HEIGHT: 68 IN | BODY MASS INDEX: 26.52 KG/M2

## 2018-05-09 DIAGNOSIS — R52 PAIN: ICD-10-CM

## 2018-05-09 DIAGNOSIS — M17.0 PRIMARY OSTEOARTHRITIS OF BOTH KNEES: Primary | ICD-10-CM

## 2018-05-09 PROCEDURE — 99212 OFFICE O/P EST SF 10 MIN: CPT | Performed by: ORTHOPAEDIC SURGERY

## 2018-05-09 NOTE — PROGRESS NOTES
Subjective: Bilateral knee pain     Patient ID: Magnolia Rios is a 83 y.o. female.    Chief Complaint:    History of Present Illness 83-year-old female seen back after having started on meloxicam has noted a reduction in the pain and discomfort in her knees she is not asymptomatic but she is able to function better with less discomfort.  She does report however today that she's been taking ibuprofen is addition to the meloxicam.       Social History     Occupational History   •  Retired     Social History Main Topics   • Smoking status: Former Smoker     Types: Cigarettes     Quit date: 9/7/1989   • Smokeless tobacco: Never Used   • Alcohol use 1.2 oz/week     2 Glasses of wine per week      Comment: occ   • Drug use: No   • Sexual activity: Yes     Partners: Male     Birth control/ protection: Post-menopausal      Review of Systems   Constitutional: Negative for chills, diaphoresis, fever and unexpected weight change.   HENT: Negative for hearing loss, nosebleeds, sore throat and tinnitus.    Eyes: Negative for pain and visual disturbance.   Respiratory: Negative for cough, shortness of breath and wheezing.    Cardiovascular: Negative for chest pain and palpitations.   Gastrointestinal: Negative for abdominal pain, diarrhea, nausea and vomiting.   Endocrine: Negative for cold intolerance, heat intolerance and polydipsia.   Genitourinary: Negative for difficulty urinating, dysuria and hematuria.   Musculoskeletal: Positive for arthralgias and myalgias. Negative for joint swelling.   Skin: Negative for rash and wound.   Allergic/Immunologic: Negative for environmental allergies.   Neurological: Negative for dizziness, syncope and numbness.   Hematological: Does not bruise/bleed easily.   Psychiatric/Behavioral: Negative for dysphoric mood and sleep disturbance. The patient is not nervous/anxious.          Past Medical History:   Diagnosis Date   • Depression    • Diabetes mellitus    • Heart palpitations    •  Hyperlipidemia    • Panic attack      Past Surgical History:   Procedure Laterality Date   • COLONOSCOPY     • HYSTERECTOMY       Family History   Problem Relation Age of Onset   • No Known Problems Mother    • Seizures Father          Objective:  There were no vitals filed for this visit.  1    05/09/18  1351   Weight: 79.4 kg (175 lb)     Body mass index is 26.61 kg/m².       Ortho Exam  she is alert and oriented ×3.  No effusion either knee but there is a boggy synovitis in the right knee.  0 125° of motion with mild crepitus and pain but no instability.  Nontender with a negative Homans.  No motor deficit good distal pulses no sensory loss in the skin is cool to touch.    Assessment:       1. Primary osteoarthritis of both knees    2. Pain          Plan:      patient was advised to take over the meloxicam and not the ibuprofen.  Return to see me as needed and depending on the timing of the return Proceed with a cortisone injection or viscus supplement injections.  Discussed this treatment plan with the patient and she was in agreement.      Work Status:    KATHRYN query complete.    Orders:  No orders of the defined types were placed in this encounter.      Medications:  No orders of the defined types were placed in this encounter.      Followup:  Return if symptoms worsen or fail to improve.          Dragon transcription disclaimer     Much of this encounter note is an electronic transcription/translation of spoken language to printed text. The electronic translation of spoken language may permit erroneous, or at times, nonsensical words or phrases to be inadvertently transcribed. Although I have reviewed the note for such errors, some may still exist.

## 2018-07-10 DIAGNOSIS — F41.0 PANIC ATTACK: ICD-10-CM

## 2018-07-10 RX ORDER — ALPRAZOLAM 0.5 MG/1
TABLET ORAL
Qty: 30 TABLET | Refills: 0 | Status: SHIPPED | OUTPATIENT
Start: 2018-07-10 | End: 2019-01-04 | Stop reason: SDUPTHER

## 2018-07-10 NOTE — TELEPHONE ENCOUNTER
Last OV: 01/30/2018  Next OV: 08/01/2018  Last RF: 09/15/2017  # 30        1rfs  Jeronimo: 01/25/2018

## 2018-07-18 DIAGNOSIS — Z51.81 MEDICATION MONITORING ENCOUNTER: ICD-10-CM

## 2018-07-18 DIAGNOSIS — E78.2 MIXED HYPERLIPIDEMIA: ICD-10-CM

## 2018-07-18 DIAGNOSIS — E11.9 TYPE 2 DIABETES MELLITUS WITHOUT COMPLICATION, WITHOUT LONG-TERM CURRENT USE OF INSULIN (HCC): ICD-10-CM

## 2018-07-25 LAB
ALT SERPL-CCNC: 14 U/L (ref 5–33)
HBA1C MFR BLD: 6.7 % (ref 4.8–5.6)
TSH SERPL DL<=0.005 MIU/L-ACNC: 3.81 MIU/ML (ref 0.27–4.2)

## 2018-08-01 ENCOUNTER — OFFICE VISIT (OUTPATIENT)
Dept: FAMILY MEDICINE CLINIC | Facility: CLINIC | Age: 83
End: 2018-08-01

## 2018-08-01 ENCOUNTER — RESULTS ENCOUNTER (OUTPATIENT)
Dept: FAMILY MEDICINE CLINIC | Facility: CLINIC | Age: 83
End: 2018-08-01

## 2018-08-01 VITALS
HEART RATE: 67 BPM | SYSTOLIC BLOOD PRESSURE: 150 MMHG | OXYGEN SATURATION: 97 % | HEIGHT: 68 IN | BODY MASS INDEX: 27.13 KG/M2 | DIASTOLIC BLOOD PRESSURE: 82 MMHG | WEIGHT: 179 LBS

## 2018-08-01 DIAGNOSIS — Z79.899 HIGH RISK MEDICATION USE: Primary | ICD-10-CM

## 2018-08-01 DIAGNOSIS — Z79.899 HIGH RISK MEDICATION USE: ICD-10-CM

## 2018-08-01 DIAGNOSIS — F33.42 RECURRENT MAJOR DEPRESSIVE DISORDER, IN FULL REMISSION (HCC): ICD-10-CM

## 2018-08-01 DIAGNOSIS — E78.2 MIXED HYPERLIPIDEMIA: ICD-10-CM

## 2018-08-01 DIAGNOSIS — E11.9 TYPE 2 DIABETES MELLITUS WITHOUT COMPLICATION, WITHOUT LONG-TERM CURRENT USE OF INSULIN (HCC): ICD-10-CM

## 2018-08-01 DIAGNOSIS — Z51.81 MEDICATION MONITORING ENCOUNTER: ICD-10-CM

## 2018-08-01 PROCEDURE — 99213 OFFICE O/P EST LOW 20 MIN: CPT | Performed by: FAMILY MEDICINE

## 2018-08-01 NOTE — PROGRESS NOTES
"  Chief Complaint   Patient presents with   • Follow-up     Last Xanax taken 4 days ago    • Hyperlipidemia   • Diabetes       Subjective   Magnolia Rios is an 83 y.o. female who presents for follow up of diabetes. Current symptoms include: none. Patient denies foot ulcerations, hyperglycemia, hypoglycemia , increased appetite, nausea, paresthesia of the feet, polydipsia, polyuria, visual disturbances, vomiting and weight loss. Evaluation to date has included: fasting blood sugar and hemoglobin A1C. Home sugars: patient does not check sugars. Current treatments: more intensive attention to diet which has been effective, low cholesterol diet which has been effective, increased aerobic exercise which has been effective and Continued statin which has been effective. Discussed importance of yearly eye exams and checking feet for skin integrity.  Takes no meds  Does not want to  A1c is ok    Biggest issue is her arthritis pain in her right knee.    The following portions of the patient's history were reviewed and updated as appropriate: allergies, current medications, past medical history, past social history, past surgical history and problem list.        Review of Systems  A comprehensive review of systems was negative except for: Musculoskeletal: positive for stiff joints and right knee pain from OA     Vitals:    08/01/18 0932   BP: 150/82   BP Location: Left arm   Patient Position: Sitting   Pulse: 67   SpO2: 97%   Weight: 81.2 kg (179 lb)   Height: 172.7 cm (68\")       Objective    Gen:  Alert, pleasant  Ears: canals clear, TMs normal  Throat: clear , no thrush, teeth ok  Neck: no bruit, no LAD  Lungs: clear  Heart: RR no murmur  Feet:  No rash, no skin breakdown, sensation grossly normal.    Laboratory:  Results for orders placed or performed in visit on 07/18/18   TSH   Result Value Ref Range    TSH 3.810 0.270 - 4.200 mIU/mL   ALT   Result Value Ref Range    ALT (SGPT) 14 5 - 33 U/L   Hemoglobin A1c   Result " Value Ref Range    Hemoglobin A1C 6.70 (H) 4.80 - 5.60 %        Assessment/Plan        Discussed general issues about diabetes pathophysiology and management.  Addressed ADA diet.  Suggested low cholesterol diet.  Encouraged aerobic exercise.  Continued statin drug see medication orders.  Follow up in 5 months or as needed.    Magnolia was seen today for follow-up, hyperlipidemia and diabetes.    Diagnoses and all orders for this visit:    High risk medication use  -     Compliance Drug Analysis, Ur - Urine, Clean Catch; Future  -     CBC (No Diff); Future    Medication monitoring encounter  -     ALT; Future  -     CBC (No Diff); Future    Mixed hyperlipidemia  -     Lipid Panel; Future  -     CBC (No Diff); Future  -     TSH; Future    Recurrent major depressive disorder, in full remission (CMS/HCC)  -     CBC (No Diff); Future    Type 2 diabetes mellitus without complication, without long-term current use of insulin (CMS/[facility])  -     Basic Metabolic Panel; Future  -     CBC (No Diff); Future  -     Hemoglobin A1c; Future  -     MicroAlbumin, Urine, Random - Urine, Clean Catch; Future        Return in about 6 months (around 2/1/2019) for Medicare Wellness visit, diabetes.  There are no Patient Instructions on file for this visit.  Medications Discontinued During This Encounter   Medication Reason   • ranitidine (ZANTAC) 150 MG tablet *Therapy completed         Dr. Nathen Bailey MD  [place], [place]  [facility].

## 2018-08-09 ENCOUNTER — OFFICE VISIT (OUTPATIENT)
Dept: ORTHOPEDIC SURGERY | Facility: CLINIC | Age: 83
End: 2018-08-09

## 2018-08-09 VITALS — HEIGHT: 68 IN | WEIGHT: 176 LBS | BODY MASS INDEX: 26.67 KG/M2

## 2018-08-09 DIAGNOSIS — M17.0 PRIMARY OSTEOARTHRITIS OF BOTH KNEES: Primary | ICD-10-CM

## 2018-08-09 PROCEDURE — 20610 DRAIN/INJ JOINT/BURSA W/O US: CPT | Performed by: ORTHOPAEDIC SURGERY

## 2018-08-09 PROCEDURE — 99213 OFFICE O/P EST LOW 20 MIN: CPT | Performed by: ORTHOPAEDIC SURGERY

## 2018-08-09 RX ORDER — LIDOCAINE HYDROCHLORIDE 10 MG/ML
4 INJECTION, SOLUTION EPIDURAL; INFILTRATION; INTRACAUDAL; PERINEURAL
Status: COMPLETED | OUTPATIENT
Start: 2018-08-09 | End: 2018-08-09

## 2018-08-09 RX ORDER — BETAMETHASONE SODIUM PHOSPHATE AND BETAMETHASONE ACETATE 3; 3 MG/ML; MG/ML
6 INJECTION, SUSPENSION INTRA-ARTICULAR; INTRALESIONAL; INTRAMUSCULAR; SOFT TISSUE
Status: COMPLETED | OUTPATIENT
Start: 2018-08-09 | End: 2018-08-09

## 2018-08-09 RX ADMIN — LIDOCAINE HYDROCHLORIDE 4 ML: 10 INJECTION, SOLUTION EPIDURAL; INFILTRATION; INTRACAUDAL; PERINEURAL at 09:11

## 2018-08-09 RX ADMIN — BETAMETHASONE SODIUM PHOSPHATE AND BETAMETHASONE ACETATE 6 MG: 3; 3 INJECTION, SUSPENSION INTRA-ARTICULAR; INTRALESIONAL; INTRAMUSCULAR; SOFT TISSUE at 09:11

## 2018-08-09 NOTE — PROGRESS NOTES
Subjective:Right knee pain.         Patient ID: Magnolia Rios is a 83 y.o. female.    Chief Complaint:    History of Present Illness Patient returns having developed discomfort in the right knee in the past month. Having discomfort getting in and out of a chair, navigating steps and walking long distances. Has been taking the meloxicam on a faithful basis but is beginning to have increasing discomfort over the past several weeks. The left knee remains relatively asymptomatic.           Social History     Occupational History   •  Retired     Social History Main Topics   • Smoking status: Former Smoker     Types: Cigarettes     Quit date: 9/7/1989   • Smokeless tobacco: Never Used   • Alcohol use 1.2 oz/week     2 Glasses of wine per week      Comment: occ   • Drug use: No   • Sexual activity: Yes     Partners: Male     Birth control/ protection: Post-menopausal      Review of Systems   Constitutional: Negative for chills, diaphoresis, fever and unexpected weight change.   HENT: Negative for hearing loss, nosebleeds, sore throat and tinnitus.    Eyes: Negative for pain and visual disturbance.   Respiratory: Negative for cough, shortness of breath and wheezing.    Cardiovascular: Negative for chest pain and palpitations.   Gastrointestinal: Negative for abdominal pain, diarrhea, nausea and vomiting.   Endocrine: Negative for cold intolerance, heat intolerance and polydipsia.   Genitourinary: Negative for difficulty urinating, dysuria and hematuria.   Musculoskeletal: Positive for arthralgias and myalgias. Negative for joint swelling.   Skin: Negative for rash and wound.   Allergic/Immunologic: Negative for environmental allergies.   Neurological: Negative for dizziness, syncope and numbness.   Hematological: Does not bruise/bleed easily.   Psychiatric/Behavioral: Negative for dysphoric mood and sleep disturbance. The patient is not nervous/anxious.          Past Medical History:   Diagnosis Date   • Depression    •  Diabetes mellitus (CMS/[facility])    • Heart palpitations    • Hyperlipidemia    • Panic attack      Past Surgical History:   Procedure Laterality Date   • COLONOSCOPY     • HYSTERECTOMY       Family History   Problem Relation Age of Onset   • No Known Problems Mother    • Seizures Father          Objective:  There were no vitals filed for this visit.  1    08/09/18 0902   Weight: 79.8 kg (176 lb)     Body mass index is 26.76 kg/m².       Ortho Exam  She is alert and oriented x 3. The right knee shows no swelling, effusion or erythema. She has 0° to 125° of motion with mild to moderate crepitus throughout the arc of motion but no patellar subluxation. No instability at 0° to 90°. No varus or valgus instability. Mild medial joint line tenderness with negative Marycarmen's. Quad function 5/5 and her calf remains nontender with a negative Homans. Skin is cool to touch. Good distal pulses. No motor sensory deficit. She has good capillary refill. Tolerating the Meloxicam without any GI side effects.         Assessment:       1. Primary osteoarthritis of both knees          Plan:Over 10 minutes was spent with the patient discussing treatment options at this time. It has been almost 6 months since the last cortisone injection, so I think it is safe to proceed with a repeat injection into the right knee. Which was given through the superolateral border 4 mL of lidocaine and 1 mL of Kenalog. She inquired about stopping the antiinflammatories, but I told her to try, and if she has not had any increase in pain, she does not need the medication, but if the knees are getting sore, she needs to get back on the medication. I did explain to her cortisone can be given every 5-6 months and if that does not last that long, viscous supplement injections are an option. Return to see me as needed, and depending on the timing of the return, we will either proceed with cortisone or receive a viscous supplement injection.      Large Joint  Arthrocentesis  Date/Time: 8/9/2018 9:11 AM  Consent given by: patient  Site marked: site marked  Timeout: Immediately prior to procedure a time out was called to verify the correct patient, procedure, equipment, support staff and site/side marked as required   Supporting Documentation  Indications: pain   Procedure Details  Location: knee - R knee  Preparation: Patient was prepped and draped in the usual sterile fashion  Needle size: 22 G  Approach: anterolateral  Medications administered: 4 mL lidocaine PF 1% 1 %; 6 mg betamethasone acetate-betamethasone sodium phosphate 6 (3-3) MG/ML  Patient tolerance: patient tolerated the procedure well with no immediate complications                  Work Status:    KATHRYN query complete.    Orders:  Orders Placed This Encounter   Procedures   • Large Joint Arthrocentesis   • Visco Treatment       Medications:  No orders of the defined types were placed in this encounter.      Followup:  Return if symptoms worsen or fail to improve.          Dragon transcription disclaimer     Much of this encounter note is an electronic transcription/translation of spoken language to printed text. The electronic translation of spoken language may permit erroneous, or at times, nonsensical words or phrases to be inadvertently transcribed. Although I have reviewed the note for such errors, some may still exist.

## 2018-08-21 ENCOUNTER — TELEPHONE (OUTPATIENT)
Dept: ORTHOPEDIC SURGERY | Facility: CLINIC | Age: 83
End: 2018-08-21

## 2018-08-21 NOTE — TELEPHONE ENCOUNTER
Patient called stating she was contacted by pharmacy that Orthovisc is ready to be delivered.  She does not want it at this time,because Dr Palacios told her if the cortisone helps then stick with that.

## 2018-08-24 ENCOUNTER — TELEPHONE (OUTPATIENT)
Dept: ORTHOPEDIC SURGERY | Facility: CLINIC | Age: 83
End: 2018-08-24

## 2018-09-10 DIAGNOSIS — E78.2 MIXED HYPERLIPIDEMIA: ICD-10-CM

## 2018-09-10 RX ORDER — EZETIMIBE 10 MG/1
TABLET ORAL
Qty: 90 TABLET | Refills: 1 | Status: SHIPPED | OUTPATIENT
Start: 2018-09-10 | End: 2019-02-06 | Stop reason: SDUPTHER

## 2018-09-10 RX ORDER — SIMVASTATIN 20 MG
TABLET ORAL
Qty: 90 TABLET | Refills: 1 | Status: SHIPPED | OUTPATIENT
Start: 2018-09-10 | End: 2019-02-06 | Stop reason: SDUPTHER

## 2018-09-26 ENCOUNTER — CLINICAL SUPPORT (OUTPATIENT)
Dept: ORTHOPEDIC SURGERY | Facility: CLINIC | Age: 83
End: 2018-09-26

## 2018-09-26 DIAGNOSIS — R52 PAIN: Primary | ICD-10-CM

## 2018-09-26 DIAGNOSIS — M17.0 PRIMARY OSTEOARTHRITIS OF BOTH KNEES: ICD-10-CM

## 2018-09-26 PROCEDURE — 20610 DRAIN/INJ JOINT/BURSA W/O US: CPT | Performed by: ORTHOPAEDIC SURGERY

## 2018-09-26 NOTE — PROGRESS NOTES
Subjective: Osteoarthritis right knee       Patient ID: Magnolia Rios female.    Chief Complaint:    History of Present Illness patient seen for first Orthovisc injection into the right knee.  The left knee remains relatively asymptomatic at this time       Social History     Occupational History   •  Retired     Social History Main Topics   • Smoking status: Former Smoker     Types: Cigarettes     Quit date: 9/7/1989   • Smokeless tobacco: Never Used   • Alcohol use 1.2 oz/week     2 Glasses of wine per week      Comment: occ   • Drug use: No   • Sexual activity: Yes     Partners: Male     Birth control/ protection: Post-menopausal      Review of Systems   Constitutional: Negative for chills, diaphoresis, fever and unexpected weight change.   HENT: Negative for hearing loss, nosebleeds, sore throat and tinnitus.    Eyes: Negative for pain and visual disturbance.   Respiratory: Negative for cough, shortness of breath and wheezing.    Cardiovascular: Negative for chest pain and palpitations.   Gastrointestinal: Negative for abdominal pain, diarrhea, nausea and vomiting.   Endocrine: Negative for cold intolerance, heat intolerance and polydipsia.   Genitourinary: Negative for difficulty urinating, dysuria and hematuria.   Musculoskeletal: Positive for arthralgias and myalgias.   Skin: Negative for rash and wound.   Allergic/Immunologic: Negative for environmental allergies.   Neurological: Negative for dizziness, syncope and numbness.   Hematological: Does not bruise/bleed easily.   Psychiatric/Behavioral: Negative for dysphoric mood and sleep disturbance. The patient is not nervous/anxious.          Past Medical History:   Diagnosis Date   • Depression    • Diabetes mellitus (CMS/HCC)    • Heart palpitations    • Hyperlipidemia    • Panic attack      Past Surgical History:   Procedure Laterality Date   • COLONOSCOPY     • HYSTERECTOMY       Family History   Problem Relation Age of Onset   • No Known Problems Mother     • Seizures Father          Objective:  There were no vitals filed for this visit.  There were no vitals filed for this visit.  There is no height or weight on file to calculate BMI.       Ortho Exam  2 ML injection given through the superolateral portal about sterile prep without complications tolerating well.  Postinjection instructions given to the patient.    Assessment:       1. Pain    2. Primary osteoarthritis of both knees          Plan: Return next week for her second injection into the right knee  Large Joint Arthrocentesis  Date/Time: 9/26/2018 11:10 AM  Consent given by: patient  Site marked: site marked  Timeout: Immediately prior to procedure a time out was called to verify the correct patient, procedure, equipment, support staff and site/side marked as required   Supporting Documentation  Indications: pain   Procedure Details  Location: knee - R knee  Preparation: Patient was prepped and draped in the usual sterile fashion  Needle size: 22 G  Medications administered: 30 mg Hyaluronan 30 MG/2ML  Patient tolerance: patient tolerated the procedure well with no immediate complications                Work Status:    KATHRYN query complete.    Orders:  Orders Placed This Encounter   Procedures   • Large Joint Arthrocentesis       Medications:  No orders of the defined types were placed in this encounter.      Followup:  Return in about 1 week (around 10/3/2018).          Dictated utilizing Dragon dictation

## 2018-10-03 ENCOUNTER — CLINICAL SUPPORT (OUTPATIENT)
Dept: ORTHOPEDIC SURGERY | Facility: CLINIC | Age: 83
End: 2018-10-03

## 2018-10-03 DIAGNOSIS — R52 PAIN: Primary | ICD-10-CM

## 2018-10-03 DIAGNOSIS — M17.0 PRIMARY OSTEOARTHRITIS OF BOTH KNEES: ICD-10-CM

## 2018-10-03 PROCEDURE — 20610 DRAIN/INJ JOINT/BURSA W/O US: CPT | Performed by: ORTHOPAEDIC SURGERY

## 2018-10-03 NOTE — PROGRESS NOTES
Subjective: Osteoarthritis right knee     Patient ID: Magnolia Rios is a 83 y.o. female.    Chief Complaint:    History of Present Illness 83-year-old female is seen for second Orthovisc injection into the right knee.  Has noted a slight improvement following the first injection       Social History     Occupational History   •  Retired     Social History Main Topics   • Smoking status: Former Smoker     Types: Cigarettes     Quit date: 9/7/1989   • Smokeless tobacco: Never Used   • Alcohol use 1.2 oz/week     2 Glasses of wine per week      Comment: occ   • Drug use: No   • Sexual activity: Yes     Partners: Male     Birth control/ protection: Post-menopausal      Review of Systems   Constitutional: Negative for chills, diaphoresis, fever and unexpected weight change.   HENT: Negative for hearing loss, nosebleeds, sore throat and tinnitus.    Eyes: Negative for pain and visual disturbance.   Respiratory: Negative for cough, shortness of breath and wheezing.    Cardiovascular: Negative for chest pain and palpitations.   Gastrointestinal: Negative for abdominal pain, diarrhea, nausea and vomiting.   Endocrine: Negative for cold intolerance, heat intolerance and polydipsia.   Genitourinary: Negative for difficulty urinating, dysuria and hematuria.   Musculoskeletal: Positive for arthralgias and myalgias. Negative for joint swelling.   Skin: Negative for rash and wound.   Allergic/Immunologic: Negative for environmental allergies.   Neurological: Negative for dizziness, syncope and numbness.   Hematological: Does not bruise/bleed easily.   Psychiatric/Behavioral: Negative for dysphoric mood and sleep disturbance. The patient is not nervous/anxious.          Past Medical History:   Diagnosis Date   • Depression    • Diabetes mellitus (CMS/HCC)    • Heart palpitations    • Hyperlipidemia    • Panic attack      Past Surgical History:   Procedure Laterality Date   • COLONOSCOPY     • HYSTERECTOMY       Family History    Problem Relation Age of Onset   • No Known Problems Mother    • Seizures Father          Objective:  There were no vitals filed for this visit.  There were no vitals filed for this visit.  There is no height or weight on file to calculate BMI.        Ortho Exam   2 ML injection was given into the right knee through the superolateral portal and sterile prep without complications tolerating it well.  Postinjection instructions given to the patient    Assessment:        1. Pain    2. Primary osteoarthritis of both knees           Plan: Return next week for third and final injection  Large Joint Arthrocentesis  Date/Time: 10/3/2018 1:12 PM  Consent given by: patient  Site marked: site marked  Timeout: Immediately prior to procedure a time out was called to verify the correct patient, procedure, equipment, support staff and site/side marked as required   Supporting Documentation  Indications: pain   Procedure Details  Location: knee - R knee  Preparation: Patient was prepped and draped in the usual sterile fashion  Needle size: 22 G  Medications administered: 30 mg Hyaluronan 30 MG/2ML  Patient tolerance: patient tolerated the procedure well with no immediate complications                Work Status:    KATHRYN query complete.    Orders:  Orders Placed This Encounter   Procedures   • Large Joint Arthrocentesis       Medications:  No orders of the defined types were placed in this encounter.      Followup:  Return in about 1 week (around 10/10/2018).          Dictated utilizing Dragon dictation

## 2018-10-10 ENCOUNTER — CLINICAL SUPPORT (OUTPATIENT)
Dept: ORTHOPEDIC SURGERY | Facility: CLINIC | Age: 83
End: 2018-10-10

## 2018-10-10 DIAGNOSIS — M17.0 PRIMARY OSTEOARTHRITIS OF BOTH KNEES: ICD-10-CM

## 2018-10-10 DIAGNOSIS — R52 PAIN: Primary | ICD-10-CM

## 2018-10-10 PROCEDURE — 20610 DRAIN/INJ JOINT/BURSA W/O US: CPT | Performed by: ORTHOPAEDIC SURGERY

## 2018-10-10 NOTE — PROGRESS NOTES
Subjective: Osteoarthritis right knee     Patient ID: Magnolia Rios is a 83 y.o. female.    Chief Complaint:    History of Present Illness patient seen for third and final Orthovisc injection into the right knee.  Has noted significant reduction in her pain and discomfort       Social History     Occupational History   •  Retired     Social History Main Topics   • Smoking status: Former Smoker     Types: Cigarettes     Quit date: 9/7/1989   • Smokeless tobacco: Never Used   • Alcohol use 1.2 oz/week     2 Glasses of wine per week      Comment: occ   • Drug use: No   • Sexual activity: Yes     Partners: Male     Birth control/ protection: Post-menopausal      Review of Systems   Constitutional: Negative for chills, diaphoresis, fever and unexpected weight change.   HENT: Negative for hearing loss, nosebleeds, sore throat and tinnitus.    Eyes: Negative for pain and visual disturbance.   Respiratory: Negative for cough, shortness of breath and wheezing.    Cardiovascular: Negative for chest pain and palpitations.   Gastrointestinal: Negative for abdominal pain, diarrhea, nausea and vomiting.   Endocrine: Negative for cold intolerance, heat intolerance and polydipsia.   Genitourinary: Negative for difficulty urinating, dysuria and hematuria.   Musculoskeletal: Positive for arthralgias. Negative for joint swelling and myalgias.   Skin: Negative for rash and wound.   Allergic/Immunologic: Negative for environmental allergies.   Neurological: Negative for dizziness, syncope and numbness.   Hematological: Does not bruise/bleed easily.   Psychiatric/Behavioral: Negative for dysphoric mood and sleep disturbance. The patient is not nervous/anxious.          Past Medical History:   Diagnosis Date   • Depression    • Diabetes mellitus (CMS/HCC)    • Heart palpitations    • Hyperlipidemia    • Panic attack      Past Surgical History:   Procedure Laterality Date   • COLONOSCOPY     • HYSTERECTOMY       Family History    Problem Relation Age of Onset   • No Known Problems Mother    • Seizures Father          Objective:  There were no vitals filed for this visit.  There were no vitals filed for this visit.  There is no height or weight on file to calculate BMI.        Ortho Exam   she is alert and oriented ×3.  Final 2 ML injection given to the superior lateral portal about sterile prep without complications tolerating it well.  Post injection instructions given to the patient    Assessment:        1. Pain    2. Primary osteoarthritis of both knees           Plan: Return to 6 weeks of symptomatic otherwise when necessary            Work Status:    KATHRYN query complete.    Orders:  Orders Placed This Encounter   Procedures   • Large Joint Arthrocentesis       Medications:  No orders of the defined types were placed in this encounter.      Followup:  Return in about 6 weeks (around 11/21/2018).          Dictated utilizing Dragon dictation   Large Joint Arthrocentesis  Date/Time: 10/10/2018 11:31 AM  Consent given by: patient  Site marked: site marked  Timeout: Immediately prior to procedure a time out was called to verify the correct patient, procedure, equipment, support staff and site/side marked as required   Supporting Documentation  Indications: pain   Procedure Details  Location: knee - R knee  Preparation: Patient was prepped and draped in the usual sterile fashion  Needle size: 22 G  Approach: anterolateral  Medications administered: 30 mg Hyaluronan 30 MG/2ML  Patient tolerance: patient tolerated the procedure well with no immediate complications

## 2018-11-28 ENCOUNTER — OFFICE VISIT (OUTPATIENT)
Dept: ORTHOPEDIC SURGERY | Facility: CLINIC | Age: 83
End: 2018-11-28

## 2018-11-28 DIAGNOSIS — M17.0 PRIMARY OSTEOARTHRITIS OF BOTH KNEES: ICD-10-CM

## 2018-11-28 DIAGNOSIS — R52 PAIN: Primary | ICD-10-CM

## 2018-11-28 PROCEDURE — 99212 OFFICE O/P EST SF 10 MIN: CPT | Performed by: ORTHOPAEDIC SURGERY

## 2018-11-28 NOTE — PROGRESS NOTES
Subjective: Osteoarthritis right knee     Patient ID: Magnolia Rios is a 84 y.o. female.    Chief Complaint:    History of Present Illness patient seen in follow-up having completed the viscus supplement injections into the right knee has noted moderate reduction in the pain discomfort.  Still some soreness getting out of a low chair or after sitting for long periods of time but all in all has had an excellent response       Social History     Occupational History     Employer: RETIRED   Tobacco Use   • Smoking status: Former Smoker     Types: Cigarettes     Last attempt to quit: 1989     Years since quittin.2   • Smokeless tobacco: Never Used   Substance and Sexual Activity   • Alcohol use: Yes     Alcohol/week: 1.2 oz     Types: 2 Glasses of wine per week     Comment: occ   • Drug use: No   • Sexual activity: Yes     Partners: Male     Birth control/protection: Post-menopausal      Review of Systems   Constitutional: Negative for chills, diaphoresis, fever and unexpected weight change.   HENT: Negative for hearing loss, nosebleeds, sore throat and tinnitus.    Eyes: Negative for pain and visual disturbance.   Respiratory: Negative for cough, shortness of breath and wheezing.    Cardiovascular: Negative for chest pain and palpitations.   Gastrointestinal: Negative for abdominal pain, diarrhea, nausea and vomiting.   Endocrine: Negative for cold intolerance, heat intolerance and polydipsia.   Genitourinary: Negative for difficulty urinating, dysuria and hematuria.   Musculoskeletal: Positive for arthralgias. Negative for joint swelling and myalgias.   Skin: Negative for rash and wound.   Allergic/Immunologic: Negative for environmental allergies.   Neurological: Negative for dizziness, syncope and numbness.   Hematological: Does not bruise/bleed easily.   Psychiatric/Behavioral: Negative for dysphoric mood and sleep disturbance. The patient is not nervous/anxious.    All other systems reviewed and are  negative.        Past Medical History:   Diagnosis Date   • Depression    • Diabetes mellitus (CMS/HCC)    • Heart palpitations    • Hyperlipidemia    • Panic attack      Past Surgical History:   Procedure Laterality Date   • COLONOSCOPY     • HYSTERECTOMY       Family History   Problem Relation Age of Onset   • No Known Problems Mother    • Seizures Father          Objective:  There were no vitals filed for this visit.  There were no vitals filed for this visit.  There is no height or weight on file to calculate BMI.        Ortho Exam   she is alert and oriented ×3.  The right knee shows no swelling effusion erythema but there is crepitus with range of motion but no patella subluxation.  No instability.  Calf is nontender with a negative Homans.  Quad function 5 over 5.  Skin is cool to touch.  No motor sensory deficit good capillary refill.  She is tolerating for Advil twice a day to help reduce the source of discomfort.    Assessment:        1. Pain    2. Primary osteoarthritis of both knees           Plan: Discussed with the patient follow-up care in the future.  His I discussed with her viscus supplement gave her repeated every 6 months since she can return in April for repeat injections but the pain becomes severe before that she will return for cortisone injection.  Patient was in agreement that treatment plan            Work Status:    KATHRYN query complete.    Orders:  No orders of the defined types were placed in this encounter.      Medications:  No orders of the defined types were placed in this encounter.      Followup:  Return if symptoms worsen or fail to improve.          Dictated utilizing Dragon dictation

## 2018-12-31 ENCOUNTER — RESULTS ENCOUNTER (OUTPATIENT)
Dept: FAMILY MEDICINE CLINIC | Facility: CLINIC | Age: 83
End: 2018-12-31

## 2018-12-31 DIAGNOSIS — E11.9 TYPE 2 DIABETES MELLITUS WITHOUT COMPLICATION, WITHOUT LONG-TERM CURRENT USE OF INSULIN (HCC): ICD-10-CM

## 2018-12-31 DIAGNOSIS — E78.2 MIXED HYPERLIPIDEMIA: ICD-10-CM

## 2018-12-31 DIAGNOSIS — Z51.81 MEDICATION MONITORING ENCOUNTER: ICD-10-CM

## 2018-12-31 DIAGNOSIS — Z79.899 HIGH RISK MEDICATION USE: ICD-10-CM

## 2018-12-31 DIAGNOSIS — F33.42 RECURRENT MAJOR DEPRESSIVE DISORDER, IN FULL REMISSION (HCC): ICD-10-CM

## 2019-01-04 DIAGNOSIS — F41.0 PANIC ATTACK: ICD-10-CM

## 2019-01-04 RX ORDER — ALPRAZOLAM 0.5 MG/1
TABLET ORAL
Qty: 30 TABLET | Refills: 0 | Status: SHIPPED | OUTPATIENT
Start: 2019-01-04 | End: 2019-04-08 | Stop reason: SDUPTHER

## 2019-01-04 NOTE — TELEPHONE ENCOUNTER
Last OV: 08/01/2018  Next OV: 02/06/2019  Last RF: 07/10/2018  #30         0rfs  Jeronimo: done today

## 2019-01-05 DIAGNOSIS — F41.0 PANIC ATTACK: ICD-10-CM

## 2019-01-07 ENCOUNTER — OFFICE VISIT (OUTPATIENT)
Dept: ORTHOPEDIC SURGERY | Facility: CLINIC | Age: 84
End: 2019-01-07

## 2019-01-07 DIAGNOSIS — M17.0 PRIMARY OSTEOARTHRITIS OF BOTH KNEES: ICD-10-CM

## 2019-01-07 DIAGNOSIS — R52 PAIN: Primary | ICD-10-CM

## 2019-01-07 PROCEDURE — 73562 X-RAY EXAM OF KNEE 3: CPT | Performed by: ORTHOPAEDIC SURGERY

## 2019-01-07 PROCEDURE — 99213 OFFICE O/P EST LOW 20 MIN: CPT | Performed by: ORTHOPAEDIC SURGERY

## 2019-01-07 RX ORDER — ALPRAZOLAM 0.5 MG/1
TABLET ORAL
Qty: 30 TABLET | Refills: 0 | OUTPATIENT
Start: 2019-01-07

## 2019-01-07 NOTE — PROGRESS NOTES
Subjective: Osteoarthritis knees     Patient ID: Magnolia Rios is a 84 y.o. female.    Chief Complaint:    History of Present Illness patient seen in follow-up to arthritic knees and underwent viscus supplement injections.  Seen today with her daughter regarding the pain that she developing once again in the right knee.  She states is not as bad as it was before the injection but still limits her pain.  Some disagreement between the patient and daughter she is how badly is affecting her ability to ambulate and perform activities of daily living.  The daughter feels she is much more limited in her activities and the patient       Social History     Occupational History     Employer: RETIRED   Tobacco Use   • Smoking status: Former Smoker     Types: Cigarettes     Last attempt to quit: 1989     Years since quittin.3   • Smokeless tobacco: Never Used   Substance and Sexual Activity   • Alcohol use: Yes     Alcohol/week: 1.2 oz     Types: 2 Glasses of wine per week     Comment: occ   • Drug use: No   • Sexual activity: Yes     Partners: Male     Birth control/protection: Post-menopausal      Review of Systems   Constitutional: Negative for chills, diaphoresis, fever and unexpected weight change.   HENT: Negative for hearing loss, nosebleeds, sore throat and tinnitus.    Eyes: Negative for pain and visual disturbance.   Respiratory: Negative for cough, shortness of breath and wheezing.    Cardiovascular: Negative for chest pain and palpitations.   Gastrointestinal: Negative for abdominal pain, diarrhea, nausea and vomiting.   Endocrine: Negative for cold intolerance, heat intolerance and polydipsia.   Genitourinary: Negative for difficulty urinating, dysuria and hematuria.   Musculoskeletal: Positive for arthralgias. Negative for joint swelling and myalgias.   Skin: Negative for rash and wound.   Allergic/Immunologic: Negative for environmental allergies.   Neurological: Negative for dizziness, syncope and  numbness.   Hematological: Does not bruise/bleed easily.   Psychiatric/Behavioral: Negative for dysphoric mood and sleep disturbance. The patient is not nervous/anxious.    All other systems reviewed and are negative.        Past Medical History:   Diagnosis Date   • Depression    • Diabetes mellitus (CMS/HCC)    • Heart palpitations    • Hyperlipidemia    • Panic attack      Past Surgical History:   Procedure Laterality Date   • COLONOSCOPY     • HYSTERECTOMY       Family History   Problem Relation Age of Onset   • No Known Problems Mother    • Seizures Father          Objective:  There were no vitals filed for this visit.  There were no vitals filed for this visit.  There is no height or weight on file to calculate BMI.        Ortho Exam   AP lateral sunrise view of both knees were done that showed tricompartmental arthritis in all 3 views.  No significant change compared to previous x-rays.  She is alert and oriented ×3.  Neither knee has any swelling effusion erythema but there is crepitus with range of motion particularly in the right knee 0-125.  There is no instability 0 90° no varus valgus instability.  Calf is nontender.  Quad function is 5 over 5.  No motor sensory deficit good capillary refill.  She is taking 3 Advil twice a day    Assessment:        1. Pain    2. Primary osteoarthritis of both knees           Plan: Over 10 minutes was spent with the patient and her daughter reviewing osteoarthritis of the knees.  Had a model in the room showing her normally an arthritic knee.  Discussed the viscus supplement injections Repeated every 6 months.  Again I've discussed with the patient and her daughter decision to proceed with surgery is the patient's decision and hers along.  Whether she had the surgery next week next month next year the surgery at the same.  Reviewed the risk of surgery of infection particularly diabetic and she reports of an A1c under 8, nerve injury, DVT, periprosthetic fracture and  rehabilitation is 3 months to a year.  Also discussed that 10-15% still have pain and discomfort despite a well implanted total knee.  With the time being and try to get her fitted for knee bracing to control her pain return to see me in 6 weeks of the knee still symptomatic we'll consider cortisone injection at that time until we can do viscus supplement again in May.  They're both in agreement treatment recommendations.            Work Status:    KATHRYN query complete.    Orders:  Orders Placed This Encounter   Procedures   • XR Knee 3 View Bilateral       Medications:  No orders of the defined types were placed in this encounter.      Followup:  Return in about 6 weeks (around 2/18/2019).          Dictated utilizing Dragon dictation

## 2019-01-27 DIAGNOSIS — F41.0 PANIC ATTACK: ICD-10-CM

## 2019-01-28 RX ORDER — FLUOXETINE HYDROCHLORIDE 20 MG/1
CAPSULE ORAL
Qty: 90 CAPSULE | Refills: 0 | Status: SHIPPED | OUTPATIENT
Start: 2019-01-28 | End: 2019-02-06 | Stop reason: SDUPTHER

## 2019-02-06 ENCOUNTER — OFFICE VISIT (OUTPATIENT)
Dept: FAMILY MEDICINE CLINIC | Facility: CLINIC | Age: 84
End: 2019-02-06

## 2019-02-06 VITALS
BODY MASS INDEX: 26.98 KG/M2 | HEART RATE: 80 BPM | SYSTOLIC BLOOD PRESSURE: 140 MMHG | WEIGHT: 178 LBS | OXYGEN SATURATION: 93 % | DIASTOLIC BLOOD PRESSURE: 82 MMHG | HEIGHT: 68 IN

## 2019-02-06 DIAGNOSIS — M17.0 PRIMARY OSTEOARTHRITIS OF BOTH KNEES: ICD-10-CM

## 2019-02-06 DIAGNOSIS — F33.42 RECURRENT MAJOR DEPRESSIVE DISORDER, IN FULL REMISSION (HCC): ICD-10-CM

## 2019-02-06 DIAGNOSIS — F41.0 PANIC ATTACK: ICD-10-CM

## 2019-02-06 DIAGNOSIS — Z28.9 VACCINATION NOT CARRIED OUT: ICD-10-CM

## 2019-02-06 DIAGNOSIS — E78.2 MIXED HYPERLIPIDEMIA: ICD-10-CM

## 2019-02-06 DIAGNOSIS — E11.9 TYPE 2 DIABETES MELLITUS WITHOUT COMPLICATION, WITHOUT LONG-TERM CURRENT USE OF INSULIN (HCC): ICD-10-CM

## 2019-02-06 DIAGNOSIS — Z12.39 BREAST CANCER SCREENING: ICD-10-CM

## 2019-02-06 DIAGNOSIS — Z00.00 MEDICARE ANNUAL WELLNESS VISIT, SUBSEQUENT: Primary | ICD-10-CM

## 2019-02-06 PROBLEM — R42 POSTURAL DIZZINESS: Status: RESOLVED | Noted: 2017-03-28 | Resolved: 2019-02-06

## 2019-02-06 PROBLEM — R41.3 MEMORY PROBLEM: Status: ACTIVE | Noted: 2019-02-06

## 2019-02-06 PROBLEM — S52.501D: Status: RESOLVED | Noted: 2017-05-16 | Resolved: 2019-02-06

## 2019-02-06 PROCEDURE — G0439 PPPS, SUBSEQ VISIT: HCPCS | Performed by: FAMILY MEDICINE

## 2019-02-06 PROCEDURE — 96160 PT-FOCUSED HLTH RISK ASSMT: CPT | Performed by: FAMILY MEDICINE

## 2019-02-06 RX ORDER — EZETIMIBE 10 MG/1
10 TABLET ORAL DAILY
Qty: 90 TABLET | Refills: 3 | Status: SHIPPED | OUTPATIENT
Start: 2019-02-06 | End: 2020-02-11

## 2019-02-06 RX ORDER — SIMVASTATIN 20 MG
20 TABLET ORAL NIGHTLY
Qty: 90 TABLET | Refills: 3 | Status: SHIPPED | OUTPATIENT
Start: 2019-02-06 | End: 2020-02-11

## 2019-02-06 RX ORDER — FLUOXETINE HYDROCHLORIDE 20 MG/1
20 CAPSULE ORAL DAILY
Qty: 90 CAPSULE | Refills: 2 | Status: SHIPPED | OUTPATIENT
Start: 2019-02-06 | End: 2020-01-03

## 2019-02-06 NOTE — PROGRESS NOTES
QUICK REFERENCE INFORMATION:  The ABCs of the Annual Wellness Visit    Subsequent Medicare Wellness Visit    HEALTH RISK ASSESSMENT    1934    Recent Hospitalizations:  No hospitalization(s) within the last year..        Current Medical Providers:  Patient Care Team:  Nathen Bailey MD as PCP - General (Family Medicine)  Enrico Palacios MD as Surgeon (Orthopedic Surgery)        Smoking Status:  Social History     Tobacco Use   Smoking Status Former Smoker   • Types: Cigarettes   • Last attempt to quit: 1989   • Years since quittin.4   Smokeless Tobacco Never Used       Alcohol Consumption:  Social History     Substance and Sexual Activity   Alcohol Use Yes   • Alcohol/week: 0.6 oz   • Types: 1 Cans of beer per week   • Frequency: Monthly or less   • Drinks per session: 1 or 2    Comment: occ       Depression Screen:   PHQ-2/PHQ-9 Depression Screening 2019   Little interest or pleasure in doing things 1   Feeling down, depressed, or hopeless 1   Trouble falling or staying asleep, or sleeping too much 0   Feeling tired or having little energy 0   Poor appetite or overeating 0   Feeling bad about yourself - or that you are a failure or have let yourself or your family down 1   Trouble concentrating on things, such as reading the newspaper or watching television 1   Moving or speaking so slowly that other people could have noticed. Or the opposite - being so fidgety or restless that you have been moving around a lot more than usual 0   Thoughts that you would be better off dead, or of hurting yourself in some way 1   Total Score 5   If you checked off any problems, how difficult have these problems made it for you to do your work, take care of things at home, or get along with other people? Somewhat difficult       Health Habits and Functional and Cognitive Screening:  Functional & Cognitive Status 2019   Do you have difficulty preparing food and eating? No   Do you have difficulty bathing  yourself, getting dressed or grooming yourself? No   Do you have difficulty using the toilet? No   Do you have difficulty moving around from place to place? -   Do you have trouble with steps or getting out of a bed or a chair? No   In the past year have you fallen or experienced a near fall? Yes   Current Diet Unhealthy Diet   Dental Exam Up to date   Eye Exam Up to date   Exercise (times per week) 0 times per week   Current Exercise Activities Include None   Do you need help using the phone?  No   Are you deaf or do you have serious difficulty hearing?  No   Do you need help with transportation? No   Do you need help shopping? No   Do you need help preparing meals?  No   Do you need help with housework?  No   Do you need help with laundry? No   Do you need help taking your medications? No   Do you need help managing money? No   Do you ever drive or ride in a car without wearing a seat belt? No   Have you felt unusual stress, anger or loneliness in the last month? Yes   Who do you live with? Spouse   If you need help, do you have trouble finding someone available to you? No   Have you been bothered in the last four weeks by sexual problems? No   Do you have difficulty concentrating, remembering or making decisions? No           Does the patient have evidence of cognitive impairment? Yes    Aspirin use counseling: Does not need ASA (and currently is not on it)      Recent Lab Results:  CMP:  Lab Results   Component Value Date     (H) 01/23/2018    BUN 18 01/23/2018    CREATININE 1.23 (H) 01/23/2018    EGFRIFNONA 42 (L) 01/23/2018    EGFRIFAFRI 51 (L) 01/23/2018    BCR 14.6 01/23/2018     01/23/2018    K 4.4 01/23/2018    CO2 30.1 (H) 01/23/2018    CALCIUM 9.5 01/23/2018    ALBUMIN 4.00 03/27/2017    BILITOT 0.7 03/27/2017    ALKPHOS 77 03/27/2017    AST 36 (H) 03/27/2017    ALT 14 07/25/2018     Lipid Panel:  Lab Results   Component Value Date    TRIG 134 01/23/2018    HDL 51 01/23/2018    VLDL 26.8  01/23/2018    LDLHDL 1.65 01/23/2018     HbA1c:  Lab Results   Component Value Date    HGBA1C 6.70 (H) 07/25/2018       Visual Acuity:  No exam data present    Age-appropriate Screening Schedule:  Refer to the list below for future screening recommendations based on patient's age, sex and/or medical conditions. Orders for these recommended tests are listed in the plan section. The patient has been provided with a written plan.    Health Maintenance   Topic Date Due   • ZOSTER VACCINE (2 of 2) 03/27/2018   • INFLUENZA VACCINE  08/01/2018   • URINE MICROALBUMIN  09/21/2018   • LIPID PANEL  01/23/2019   • HEMOGLOBIN A1C  01/25/2019   • PNEUMOCOCCAL VACCINES (65+ LOW/MEDIUM RISK)  Discontinued   • TDAP/TD VACCINES  Discontinued        Subjective   History of Present Illness    Magnolia Rios is a 84 y.o. female who presents for an Subsequent Wellness Visit.    The following portions of the patient's history were reviewed and updated as appropriate: allergies, current medications, past medical history, past social history, past surgical history and problem list.    Outpatient Medications Prior to Visit   Medication Sig Dispense Refill   • ALPRAZolam (XANAX) 0.5 MG tablet TAKE ONE TABLET BY MOUTH ONCE NIGHTLY AS NEEDED FOR ANXIETY 30 tablet 0   • cholecalciferol 2000 UNITS tablet Take 2,000 Units by mouth Daily. 30 tablet 1   • meloxicam (MOBIC) 7.5 MG tablet Take 1 tablet by mouth Daily. 30 tablet 5   • Multiple Vitamins-Minerals (MULTIVITAMIN WITH MINERALS) tablet tablet Take 1 tablet by mouth Daily.     • vitamin C (ASCORBIC ACID) 250 MG tablet Take 250 mg by mouth Daily.     • ezetimibe (ZETIA) 10 MG tablet TAKE 1 TABLET DAILY FOR ELEVATION OF BOTH CHOLESTEROL AND TRIGLYCERIDES IN BLOOD 90 tablet 1   • FLUoxetine (PROzac) 20 MG capsule TAKE 1 CAPSULE DAILY 90 capsule 0   • simvastatin (ZOCOR) 20 MG tablet TAKE 1 TABLET EVERY NIGHT 90 tablet 1   • HYDROcodone-acetaminophen (NORCO) 5-325 MG per tablet Take 1 tablet  "by mouth Every 6 (Six) Hours As Needed for Moderate Pain  or Severe Pain . 24 tablet 0     No facility-administered medications prior to visit.        Patient Active Problem List   Diagnosis   • Panic attack   • Mixed hyperlipidemia   • Type 2 diabetes mellitus without complication, without long-term current use of insulin (CMS/[facility])   • Recurrent major depressive disorder, in full remission (CMS/[facility])   • Menopause   • Medication monitoring encounter   • Medicare annual wellness visit, subsequent   • Vaccination not carried out   • Primary osteoarthritis of both knees   • Memory problem       Advance Care Planning:  has an advance directive - a copy HAS NOT been provided    Identification of Risk Factors:  Risk factors include: cardiovascular risk, increased fall risk, chronic pain, depression and vision limitations.    Review of Systems    Compared to one year ago, the patient feels her physical health is worse.  Compared to one year ago, the patient feels her mental health is worse.    Objective     Physical Exam   Constitutional: She appears well-developed and well-nourished. No distress.   HENT:   Mouth/Throat: Oropharynx is clear and moist.   Cardiovascular: Normal rate.   Pulmonary/Chest: Effort normal.   Musculoskeletal:        Right knee: She exhibits swelling, effusion and bony tenderness. Tenderness found.   Nursing note and vitals reviewed.      Vitals:    02/06/19 0952   BP: 140/82   BP Location: Left arm   Patient Position: Sitting   Pulse: 80   SpO2: 93%   Weight: 80.7 kg (178 lb)   Height: 172.7 cm (68\")   PainSc:   8   PainLoc: Knee       Patient's Body mass index is 27.06 kg/m². BMI is within normal parameters. No follow-up required..      Assessment/Plan   Patient Self-Management and Personalized Health Advice  The patient has been provided with information about: exercise, prevention of cardiac or vascular disease, fall prevention and mental health concerns and preventive services including: "   · Diabetes screening, see lab orders, Fall Risk assessment done, Screening mammography, referral placed.    Visit Diagnoses:    ICD-10-CM ICD-9-CM   1. Medicare annual wellness visit, subsequent Z00.00 V70.0   2. Mixed hyperlipidemia E78.2 272.2   3. Panic attack F41.0 300.01   4. Recurrent major depressive disorder, in full remission (CMS/[facility]) F33.42 296.36   5. Type 2 diabetes mellitus without complication, without long-term current use of insulin (CMS/[facility]) E11.9 250.00   6. Vaccination not carried out Z28.9 V64.00   7. Primary osteoarthritis of both knees M17.0 715.16   8. Breast cancer screening Z12.31 V76.10       Orders Placed This Encounter   Procedures   • Mammo Screening Digital Tomosynthesis Bilateral With CAD     Standing Status:   Future     Standing Expiration Date:   2/6/2020     Order Specific Question:   Reason for Exam:     Answer:   screening       Outpatient Encounter Medications as of 2/6/2019   Medication Sig Dispense Refill   • ALPRAZolam (XANAX) 0.5 MG tablet TAKE ONE TABLET BY MOUTH ONCE NIGHTLY AS NEEDED FOR ANXIETY 30 tablet 0   • cholecalciferol 2000 UNITS tablet Take 2,000 Units by mouth Daily. 30 tablet 1   • ezetimibe (ZETIA) 10 MG tablet Take 1 tablet by mouth Daily. 90 tablet 3   • FLUoxetine (PROzac) 20 MG capsule Take 1 capsule by mouth Daily. 90 capsule 2   • meloxicam (MOBIC) 7.5 MG tablet Take 1 tablet by mouth Daily. 30 tablet 5   • Multiple Vitamins-Minerals (MULTIVITAMIN WITH MINERALS) tablet tablet Take 1 tablet by mouth Daily.     • simvastatin (ZOCOR) 20 MG tablet Take 1 tablet by mouth Every Night. 90 tablet 3   • vitamin C (ASCORBIC ACID) 250 MG tablet Take 250 mg by mouth Daily.     • [DISCONTINUED] ezetimibe (ZETIA) 10 MG tablet TAKE 1 TABLET DAILY FOR ELEVATION OF BOTH CHOLESTEROL AND TRIGLYCERIDES IN BLOOD 90 tablet 1   • [DISCONTINUED] FLUoxetine (PROzac) 20 MG capsule TAKE 1 CAPSULE DAILY 90 capsule 0   • [DISCONTINUED] simvastatin (ZOCOR) 20 MG tablet TAKE 1  TABLET EVERY NIGHT 90 tablet 1   • [DISCONTINUED] HYDROcodone-acetaminophen (NORCO) 5-325 MG per tablet Take 1 tablet by mouth Every 6 (Six) Hours As Needed for Moderate Pain  or Severe Pain . 24 tablet 0     No facility-administered encounter medications on file as of 2/6/2019.        Reviewed use of high risk medication in the elderly: yes  Reviewed for potential of harmful drug interactions in the elderly: yes   Lots of right knee pain, end stage OA , at risk of falls, seeing ortho  Labs today  Anxiety is ok, but depressed about her pet dog dying  Some new memory problems  Refuses all vaccines  Consents for a mammo    Follow Up:  Return in about 6 months (around 8/6/2019).     An After Visit Summary and PPPS with all of these plans were given to the patient.

## 2019-02-07 LAB
ALT SERPL-CCNC: 16 IU/L (ref 0–32)
BUN SERPL-MCNC: 19 MG/DL (ref 8–27)
BUN/CREAT SERPL: 17 (ref 12–28)
CALCIUM SERPL-MCNC: 9.8 MG/DL (ref 8.7–10.3)
CHLORIDE SERPL-SCNC: 102 MMOL/L (ref 96–106)
CHOLEST SERPL-MCNC: 174 MG/DL (ref 100–199)
CO2 SERPL-SCNC: 24 MMOL/L (ref 20–29)
CREAT SERPL-MCNC: 1.1 MG/DL (ref 0.57–1)
ERYTHROCYTE [DISTWIDTH] IN BLOOD BY AUTOMATED COUNT: 13.5 % (ref 12.3–15.4)
GLUCOSE SERPL-MCNC: 138 MG/DL (ref 65–99)
HBA1C MFR BLD: 7 % (ref 4.8–5.6)
HCT VFR BLD AUTO: 40.2 % (ref 34–46.6)
HDLC SERPL-MCNC: 41 MG/DL
HGB BLD-MCNC: 13.2 G/DL (ref 11.1–15.9)
LDLC SERPL CALC-MCNC: 86 MG/DL (ref 0–99)
MCH RBC QN AUTO: 29.5 PG (ref 26.6–33)
MCHC RBC AUTO-ENTMCNC: 32.8 G/DL (ref 31.5–35.7)
MCV RBC AUTO: 90 FL (ref 79–97)
MICROALBUMIN UR-MCNC: 45 UG/ML
PLATELET # BLD AUTO: 289 X10E3/UL (ref 150–379)
POTASSIUM SERPL-SCNC: 4.4 MMOL/L (ref 3.5–5.2)
RBC # BLD AUTO: 4.48 X10E6/UL (ref 3.77–5.28)
SODIUM SERPL-SCNC: 142 MMOL/L (ref 134–144)
TRIGL SERPL-MCNC: 234 MG/DL (ref 0–149)
TSH SERPL DL<=0.005 MIU/L-ACNC: 4.6 UIU/ML (ref 0.45–4.5)
VLDLC SERPL CALC-MCNC: 47 MG/DL (ref 5–40)
WBC # BLD AUTO: 7.8 X10E3/UL (ref 3.4–10.8)

## 2019-02-10 LAB — DRUGS UR: NORMAL

## 2019-02-15 ENCOUNTER — HOSPITAL ENCOUNTER (OUTPATIENT)
Dept: MAMMOGRAPHY | Facility: HOSPITAL | Age: 84
Discharge: HOME | End: 2019-02-15
Admitting: FAMILY MEDICINE

## 2019-02-15 DIAGNOSIS — Z12.39 BREAST CANCER SCREENING: ICD-10-CM

## 2019-02-15 PROCEDURE — 77067 SCR MAMMO BI INCL CAD: CPT

## 2019-02-15 PROCEDURE — 77063 BREAST TOMOSYNTHESIS BI: CPT

## 2019-02-18 ENCOUNTER — OFFICE VISIT (OUTPATIENT)
Dept: ORTHOPEDIC SURGERY | Facility: CLINIC | Age: 84
End: 2019-02-18

## 2019-02-18 DIAGNOSIS — R52 PAIN: Primary | ICD-10-CM

## 2019-02-18 DIAGNOSIS — M17.0 PRIMARY OSTEOARTHRITIS OF BOTH KNEES: ICD-10-CM

## 2019-02-18 PROCEDURE — 20610 DRAIN/INJ JOINT/BURSA W/O US: CPT | Performed by: ORTHOPAEDIC SURGERY

## 2019-02-18 RX ORDER — LIDOCAINE HYDROCHLORIDE 10 MG/ML
4 INJECTION, SOLUTION EPIDURAL; INFILTRATION; INTRACAUDAL; PERINEURAL
Status: COMPLETED | OUTPATIENT
Start: 2019-02-18 | End: 2019-02-18

## 2019-02-18 RX ORDER — BETAMETHASONE SODIUM PHOSPHATE AND BETAMETHASONE ACETATE 3; 3 MG/ML; MG/ML
6 INJECTION, SUSPENSION INTRA-ARTICULAR; INTRALESIONAL; INTRAMUSCULAR; SOFT TISSUE
Status: COMPLETED | OUTPATIENT
Start: 2019-02-18 | End: 2019-02-18

## 2019-02-18 RX ADMIN — BETAMETHASONE SODIUM PHOSPHATE AND BETAMETHASONE ACETATE 6 MG: 3; 3 INJECTION, SUSPENSION INTRA-ARTICULAR; INTRALESIONAL; INTRAMUSCULAR; SOFT TISSUE at 11:31

## 2019-02-18 RX ADMIN — LIDOCAINE HYDROCHLORIDE 4 ML: 10 INJECTION, SOLUTION EPIDURAL; INFILTRATION; INTRACAUDAL; PERINEURAL at 11:31

## 2019-02-18 NOTE — PROGRESS NOTES
Subjective: Right knee pain     Patient ID: Magnolia Rios is a 84 y.o. female.    Chief Complaint:    History of Present Illness 84 y.o. female returns with the right knee still symptomatic requested a cortisone injection.  Left knee she states is not giving her that much pain at this time       Social History     Occupational History     Employer: RETIRED   Tobacco Use   • Smoking status: Former Smoker     Types: Cigarettes     Last attempt to quit: 1989     Years since quittin.4   • Smokeless tobacco: Never Used   Substance and Sexual Activity   • Alcohol use: Yes     Alcohol/week: 0.6 oz     Types: 1 Cans of beer per week     Frequency: Monthly or less     Drinks per session: 1 or 2     Comment: occ   • Drug use: No   • Sexual activity: Yes     Partners: Male     Birth control/protection: Post-menopausal      Review of Systems   Constitutional: Negative for chills, diaphoresis, fever and unexpected weight change.   HENT: Negative for hearing loss, nosebleeds, sore throat and tinnitus.    Eyes: Negative for pain and visual disturbance.   Respiratory: Negative for cough, shortness of breath and wheezing.    Cardiovascular: Negative for chest pain and palpitations.   Gastrointestinal: Negative for abdominal pain, diarrhea, nausea and vomiting.   Endocrine: Negative for cold intolerance, heat intolerance and polydipsia.   Genitourinary: Negative for difficulty urinating, dysuria and hematuria.   Musculoskeletal: Positive for arthralgias. Negative for joint swelling and myalgias.   Skin: Negative for rash and wound.   Allergic/Immunologic: Negative for environmental allergies.   Neurological: Negative for dizziness, syncope and numbness.   Hematological: Does not bruise/bleed easily.   Psychiatric/Behavioral: Negative for dysphoric mood and sleep disturbance. The patient is not nervous/anxious.    All other systems reviewed and are negative.        Past Medical History:   Diagnosis Date   • Depression     • Diabetes mellitus (CMS/HCC)    • Heart palpitations    • Hyperlipidemia    • Panic attack      Past Surgical History:   Procedure Laterality Date   • COLONOSCOPY     • HYSTERECTOMY       Family History   Problem Relation Age of Onset   • No Known Problems Mother    • Seizures Father    • Breast cancer Neg Hx          Objective:  There were no vitals filed for this visit.  There were no vitals filed for this visit.  There is no height or weight on file to calculate BMI.        Ortho Exam   She is alert and oriented x3.  The right knee has no swelling and there is a mild boggy synovitis and there is crepitus with range of motion which is 0-125 degrees with no instability.  No patella subluxation.  Quad function 5/5 in the calf is nontender.  Good distal pulses no motor or sensory deficit.  No instability to varus valgus stress    Assessment:        1. Pain    2. Primary osteoarthritis of both knees           Plan: The right knee was injected with 4 cc of lidocaine 1 cc of Celestone.  Postinjection instructions given to the patient.  She will return to see me as needed regarding the left knee for a cortisone injection but again at 6 months from her last injection we can repeat the Visco supplement injections postinjection instructions given to the patient and her daughter  Large Joint Arthrocentesis: R knee  Date/Time: 2/18/2019 11:31 AM  Consent given by: patient  Site marked: site marked  Timeout: Immediately prior to procedure a time out was called to verify the correct patient, procedure, equipment, support staff and site/side marked as required   Supporting Documentation  Indications: pain   Procedure Details  Location: knee - R knee  Preparation: Patient was prepped and draped in the usual sterile fashion  Needle size: 22 G  Approach: superior (LATERAL )  Medications administered: 4 mL lidocaine PF 1% 1 %; 6 mg betamethasone acetate-betamethasone sodium phosphate 6 (3-3) MG/ML  Patient tolerance: patient  tolerated the procedure well with no immediate complications                  Work Status:    KATHRYN query complete.    Orders:  Orders Placed This Encounter   Procedures   • Large Joint Arthrocentesis: R knee       Medications:  No orders of the defined types were placed in this encounter.      Followup:  Return if symptoms worsen or fail to improve.          Dictated utilizing Dragon dictation

## 2019-04-08 DIAGNOSIS — F41.0 PANIC ATTACK: ICD-10-CM

## 2019-04-09 RX ORDER — ALPRAZOLAM 0.5 MG/1
TABLET ORAL
Qty: 30 TABLET | Refills: 0 | Status: SHIPPED | OUTPATIENT
Start: 2019-04-09 | End: 2019-06-04 | Stop reason: SDUPTHER

## 2019-05-14 ENCOUNTER — TELEPHONE (OUTPATIENT)
Dept: ORTHOPEDIC SURGERY | Facility: CLINIC | Age: 84
End: 2019-05-14

## 2019-05-14 NOTE — TELEPHONE ENCOUNTER
Patient stated that her leg was hurting her. The pain was equivalent to labor pain she said. She wanted to see EEJ, I told her he wouldn't have openings until next week. And offered her an appointment with [facility] today. She said she would have rather seen EEJ and she was in a lot of pain. I recommended her going to the ER if she was in that much pain.

## 2019-06-04 ENCOUNTER — OFFICE VISIT (OUTPATIENT)
Dept: FAMILY MEDICINE CLINIC | Facility: CLINIC | Age: 84
End: 2019-06-04

## 2019-06-04 VITALS
SYSTOLIC BLOOD PRESSURE: 138 MMHG | WEIGHT: 178.5 LBS | DIASTOLIC BLOOD PRESSURE: 90 MMHG | OXYGEN SATURATION: 98 % | BODY MASS INDEX: 27.05 KG/M2 | HEIGHT: 68 IN | HEART RATE: 70 BPM

## 2019-06-04 DIAGNOSIS — F41.0 PANIC ATTACK: ICD-10-CM

## 2019-06-04 DIAGNOSIS — R41.3 MEMORY PROBLEM: Primary | ICD-10-CM

## 2019-06-04 DIAGNOSIS — M17.0 PRIMARY OSTEOARTHRITIS OF BOTH KNEES: ICD-10-CM

## 2019-06-04 PROCEDURE — 99213 OFFICE O/P EST LOW 20 MIN: CPT | Performed by: FAMILY MEDICINE

## 2019-06-04 RX ORDER — MELOXICAM 15 MG/1
15 TABLET ORAL DAILY
Qty: 30 TABLET | Refills: 5 | Status: SHIPPED | OUTPATIENT
Start: 2019-06-04 | End: 2019-12-23

## 2019-06-04 RX ORDER — ALPRAZOLAM 0.5 MG/1
0.5 TABLET ORAL NIGHTLY PRN
Qty: 30 TABLET | Refills: 5 | Status: SHIPPED | OUTPATIENT
Start: 2019-06-04 | End: 2019-11-08 | Stop reason: SDUPTHER

## 2019-06-04 NOTE — PROGRESS NOTES
Subjective   Magnolia Rios is a 84 y.o. female who is here for   Chief Complaint   Patient presents with   • Memory Loss   .     History of Present Illness   Anxiety: Patient complains of anxiety disorder and sleep disturbance.  She has the following symptoms: difficulty concentrating, feelings of losing control, irritable. Onset of symptoms was approximately several years ago, rapidly worsening since that time. She denies current suicidal and homicidal ideation. Family history significant for anxiety.Possible organic causes contributing are: none. Risk factors: developing dementia Previous treatment includes Prozac and Xanax and medication.  She complains of the following side effects from the treatment: none   Karin is brought in by adult daughter due to worsening memory, anger, forgetful, concerns of dementia.  Forgetting names  .    Knee pain is also getting worse        The following portions of the patient's history were reviewed and updated as appropriate: allergies, current medications, past family history, past medical history, past social history, past surgical history and problem list.    Review of Systems   Psychiatric/Behavioral: Positive for agitation, confusion, dysphoric mood and sleep disturbance. The patient is nervous/anxious.        Objective   Physical Exam   Constitutional: She appears well-developed and well-nourished. She appears distressed.   Cardiovascular: Normal rate.   Pulmonary/Chest: Effort normal.   Neurological: She is alert. No cranial nerve deficit.   Psychiatric: Judgment and thought content normal. Her mood appears anxious. Her affect is angry. She is agitated. Cognition and memory are impaired. She exhibits abnormal recent memory. She exhibits normal remote memory.   Nursing note and vitals reviewed.      Assessment/Plan   Magnolia was seen today for memory loss.    Diagnoses and all orders for this visit:    Memory problem  -     CT Head Without Contrast; Future    Primary  osteoarthritis of both knees  -     meloxicam (MOBIC) 15 MG tablet; Take 1 tablet by mouth Daily.    Panic attack  -     ALPRAZolam (XANAX) 0.5 MG tablet; Take 1 tablet by mouth At Night As Needed for Anxiety. for anxiety    patient is fearful/reluctant to take meds  Family see when she takes her daily xanax she is more calm and less frustrated.  Recent labs ok  Scan brain for dementia w/u  Might start Namenda next OV    There are no Patient Instructions on file for this visit.    Medications Discontinued During This Encounter   Medication Reason   • meloxicam (MOBIC) 7.5 MG tablet Reorder   • ALPRAZolam (XANAX) 0.5 MG tablet Reorder        Return in about 1 month (around 7/4/2019) for new medication follow up.    Dr. Nathen Bailey  [facility]  [place], [place]

## 2019-06-13 ENCOUNTER — HOSPITAL ENCOUNTER (OUTPATIENT)
Dept: CT IMAGING | Facility: HOSPITAL | Age: 84
Discharge: HOME | End: 2019-06-13
Admitting: FAMILY MEDICINE

## 2019-06-13 DIAGNOSIS — R41.3 MEMORY PROBLEM: ICD-10-CM

## 2019-06-13 PROCEDURE — 70450 CT HEAD/BRAIN W/O DYE: CPT

## 2019-07-10 ENCOUNTER — OFFICE VISIT (OUTPATIENT)
Dept: FAMILY MEDICINE CLINIC | Facility: CLINIC | Age: 84
End: 2019-07-10

## 2019-07-10 VITALS
BODY MASS INDEX: 26.98 KG/M2 | WEIGHT: 178 LBS | OXYGEN SATURATION: 95 % | HEIGHT: 68 IN | HEART RATE: 82 BPM | DIASTOLIC BLOOD PRESSURE: 92 MMHG | SYSTOLIC BLOOD PRESSURE: 154 MMHG

## 2019-07-10 DIAGNOSIS — F33.42 RECURRENT MAJOR DEPRESSIVE DISORDER, IN FULL REMISSION (HCC): ICD-10-CM

## 2019-07-10 DIAGNOSIS — R41.3 MEMORY PROBLEM: Primary | ICD-10-CM

## 2019-07-10 DIAGNOSIS — E11.9 TYPE 2 DIABETES MELLITUS WITHOUT COMPLICATION, WITHOUT LONG-TERM CURRENT USE OF INSULIN (HCC): ICD-10-CM

## 2019-07-10 DIAGNOSIS — R79.89 ABNORMAL TSH: ICD-10-CM

## 2019-07-10 DIAGNOSIS — F41.0 PANIC ATTACK: ICD-10-CM

## 2019-07-10 PROCEDURE — 99214 OFFICE O/P EST MOD 30 MIN: CPT | Performed by: FAMILY MEDICINE

## 2019-07-10 NOTE — PATIENT INSTRUCTIONS
CT HEAD, NONCONTRAST, 06/13/2019     HISTORY:  Difficulty, straightening feelings of losing control. Onset of symptoms  several years ago and the symptoms are worsening COMPARISON:  03/27/2017        TECHNIQUE:  CT examination of the head was performed without IV contrast. Radiation  dose reduction techniques included automated exposure control or  exposure modulation based on body size. Radiation audit for CT and  nuclear cardiology exams in the last12 months: 0.           FINDINGS:  . The ventricles are slightly prominent but unchanged from prior study.  The subarachnoid spaces are normal. The frontal horn of the right  lateral ventricle measures 24 mm on both the old study and the new  study. There is a slight increase in size of the temporal horns. On the  current study it measured 11 mm in transverse dimension and on the  previous study they measure 8 mm in transverse dimension. There is no  mass or extra-axial fluid collection or hemorrhage.     IMPRESSION:  The ventricles and in particular the lateral ventricles are slightly  enlarged as compared with the sulcal appearance. Similar appearance was  seen 03/27/2017. Temporal horns have increased slightly in size since  that exam. Clinical correlation for any evidence of normal pressure  hydrocephalus is recommended.     Otherwise normal     This report was finalized on 6/13/2019 11:25 AM by Dr. Cruz Max MD.

## 2019-07-10 NOTE — PROGRESS NOTES
Subjective   Magnolia Rios is a 84 y.o. female who is here for   Chief Complaint   Patient presents with   • Follow-up   • Memory Loss   • Knee Pain   • Anxiety   .     History of Present Illness   Started mobic for knee pain last ov    Ordered CT of brain for memory problems, see report below.  A question of NPH;  She does have memory issues  And a unsteady gain, but she has end stage knees  And urinary incontinence, but has had that since menopause, 30 yrs ago        Started xanax for panic attacks.  Daughter gives her 1/2 a pill a day and it has worked wonders for her mood.        The following portions of the patient's history were reviewed and updated as appropriate: allergies, current medications, past family history, past medical history, past social history, past surgical history and problem list.    Review of Systems    Objective   Physical Exam   Constitutional: She appears well-developed and well-nourished.   Cardiovascular: Normal rate.   Pulmonary/Chest: Effort normal.   Musculoskeletal:        Right knee: She exhibits decreased range of motion and swelling. Tenderness found.        Left knee: She exhibits decreased range of motion. Tenderness found.   Psychiatric: Her speech is normal. Her affect is blunt and labile. She is aggressive. She expresses impulsivity. She does not express inappropriate judgment. She exhibits abnormal recent memory. She exhibits normal remote memory.   Nursing note and vitals reviewed.      Assessment/Plan   Magnolia was seen today for follow-up, memory loss, knee pain and anxiety.    Diagnoses and all orders for this visit:    Memory problem  -     Basic Metabolic Panel; Future  -     Lipid Panel; Future  -     ALT; Future  -     Hemoglobin A1c; Future  -     TSH; Future  -     T4, Free; Future  -     T3; Future  -     Vitamin B12; Future  -     Folate; Future    Panic attack    Recurrent major depressive disorder, in full remission (CMS/HCC)    Type 2 diabetes mellitus  without complication, without long-term current use of insulin (CMS/[facility])  -     Basic Metabolic Panel; Future  -     Lipid Panel; Future  -     Hemoglobin A1c; Future    Abnormal TSH  -     TSH; Future  -     T4, Free; Future  -     T3; Future      She did not want to increase xanax nor Prozac    She is happy with the mobic for knee OA    If memory continues to decline will refer to neuro for a NPH eval/lumbar puncture.      Patient Instructions   CT HEAD, NONCONTRAST, 06/13/2019     HISTORY:  Difficulty, straightening feelings of losing control. Onset of symptoms  several years ago and the symptoms are worsening COMPARISON:  03/27/2017        TECHNIQUE:  CT examination of the head was performed without IV contrast. Radiation  dose reduction techniques included automated exposure control or  exposure modulation based on body size. Radiation audit for CT and  nuclear cardiology exams in the last12 months: 0.           FINDINGS:  . The ventricles are slightly prominent but unchanged from prior study.  The subarachnoid spaces are normal. The frontal horn of the right  lateral ventricle measures 24 mm on both the old study and the new  study. There is a slight increase in size of the temporal horns. On the  current study it measured 11 mm in transverse dimension and on the  previous study they measure 8 mm in transverse dimension. There is no  mass or extra-axial fluid collection or hemorrhage.     IMPRESSION:  The ventricles and in particular the lateral ventricles are slightly  enlarged as compared with the sulcal appearance. Similar appearance was  seen 03/27/2017. Temporal horns have increased slightly in size since  that exam. Clinical correlation for any evidence of normal pressure  hydrocephalus is recommended.     Otherwise normal     This report was finalized on 6/13/2019 11:25 AM by Dr. Cruz Max MD.           There are no discontinued medications.     Return in about 1 month (around 8/10/2019).      Nathen Bailey  [facility] [place], [place]

## 2019-07-31 DIAGNOSIS — R79.89 ABNORMAL TSH: ICD-10-CM

## 2019-07-31 DIAGNOSIS — R41.3 MEMORY PROBLEM: ICD-10-CM

## 2019-07-31 DIAGNOSIS — E11.9 TYPE 2 DIABETES MELLITUS WITHOUT COMPLICATION, WITHOUT LONG-TERM CURRENT USE OF INSULIN (HCC): ICD-10-CM

## 2019-08-02 LAB
ALT SERPL-CCNC: 18 U/L (ref 1–33)
BUN SERPL-MCNC: 23 MG/DL (ref 8–23)
BUN/CREAT SERPL: 18.5 (ref 7–25)
CALCIUM SERPL-MCNC: 9.8 MG/DL (ref 8.6–10.5)
CHLORIDE SERPL-SCNC: 103 MMOL/L (ref 98–107)
CHOLEST SERPL-MCNC: 171 MG/DL (ref 0–200)
CO2 SERPL-SCNC: 28.6 MMOL/L (ref 22–29)
CREAT SERPL-MCNC: 1.24 MG/DL (ref 0.57–1)
FOLATE SERPL-MCNC: >20 NG/ML (ref 4.78–24.2)
GLUCOSE SERPL-MCNC: 147 MG/DL (ref 65–99)
HBA1C MFR BLD: 7.2 % (ref 4.8–5.6)
HDLC SERPL-MCNC: 43 MG/DL (ref 40–60)
LDLC SERPL CALC-MCNC: 94 MG/DL (ref 0–100)
POTASSIUM SERPL-SCNC: 4.5 MMOL/L (ref 3.5–5.2)
SODIUM SERPL-SCNC: 142 MMOL/L (ref 136–145)
T3 SERPL-MCNC: 113 NG/DL (ref 80–200)
T4 FREE SERPL-MCNC: 1.06 NG/DL (ref 0.93–1.7)
TRIGL SERPL-MCNC: 169 MG/DL (ref 0–150)
TSH SERPL DL<=0.005 MIU/L-ACNC: 4.35 MIU/ML (ref 0.27–4.2)
VIT B12 SERPL-MCNC: 877 PG/ML (ref 211–946)
VLDLC SERPL CALC-MCNC: 33.8 MG/DL

## 2019-08-07 ENCOUNTER — OFFICE VISIT (OUTPATIENT)
Dept: FAMILY MEDICINE CLINIC | Facility: CLINIC | Age: 84
End: 2019-08-07

## 2019-08-07 VITALS
OXYGEN SATURATION: 96 % | DIASTOLIC BLOOD PRESSURE: 84 MMHG | HEIGHT: 68 IN | BODY MASS INDEX: 27.43 KG/M2 | HEART RATE: 65 BPM | WEIGHT: 181 LBS | RESPIRATION RATE: 16 BRPM | SYSTOLIC BLOOD PRESSURE: 122 MMHG

## 2019-08-07 DIAGNOSIS — E78.2 MIXED HYPERLIPIDEMIA: ICD-10-CM

## 2019-08-07 DIAGNOSIS — E03.8 SUBCLINICAL HYPOTHYROIDISM: ICD-10-CM

## 2019-08-07 DIAGNOSIS — R41.3 MEMORY PROBLEM: Primary | ICD-10-CM

## 2019-08-07 DIAGNOSIS — E11.9 TYPE 2 DIABETES MELLITUS WITHOUT COMPLICATION, WITHOUT LONG-TERM CURRENT USE OF INSULIN (HCC): ICD-10-CM

## 2019-08-07 PROCEDURE — 99214 OFFICE O/P EST MOD 30 MIN: CPT | Performed by: FAMILY MEDICINE

## 2019-08-07 RX ORDER — LEVOTHYROXINE SODIUM 0.05 MG/1
50 TABLET ORAL DAILY
Qty: 90 TABLET | Refills: 3 | Status: SHIPPED | OUTPATIENT
Start: 2019-08-07 | End: 2020-11-03

## 2019-08-07 RX ORDER — METFORMIN HYDROCHLORIDE 500 MG/1
500 TABLET, EXTENDED RELEASE ORAL
Qty: 90 TABLET | Refills: 3 | Status: SHIPPED | OUTPATIENT
Start: 2019-08-07 | End: 2020-07-28

## 2019-08-07 NOTE — PROGRESS NOTES
"  Chief Complaint   Patient presents with   • Hypertension   • Hyperlipidemia   • Depression   • Diabetes       Subjective   Magnolia Rios is an 84 y.o. female who presents for follow up of diabetes. Current symptoms include: none. Patient denies foot ulcerations, paresthesia of the feet and visual disturbances. Evaluation to date has included: fasting blood sugar, fasting lipid panel and hemoglobin A1C. Home sugars: patient does not check sugars. Current treatments: more intensive attention to diet which has been not very effective. Discussed importance of yearly eye exams and checking feet for skin integrity.    Hypothyroidism: Patient presents for evaluation of thyroid function. Symptoms consist of fatigue, anxiousness, depression. Symptoms have present for unknown months. The symptoms are mild.  The problem has been unchanged.  Previous thyroid studies include TSH and total T4. The hypothyroidism is due to hypothyroidism.    Mood is better , taking 1/2 xanax every day.          The following portions of the patient's history were reviewed and updated as appropriate: allergies, current medications, past medical history, past social history, past surgical history and problem list.        Review of Systems  Pertinent items are noted in HPI.     Vitals:    08/07/19 1246   BP: 122/84   BP Location: Left arm   Patient Position: Sitting   Cuff Size: Adult   Pulse: 65   Resp: 16   SpO2: 96%   Weight: 82.1 kg (181 lb)   Height: 172.7 cm (68\")       Objective    Gen:  Alert, pleasant  Ears: canals clear, TMs normal  Throat: clear , no thrush, teeth ok  Neck: no bruit, no LAD  Lungs: clear  Heart: RR no murmur  Feet:  No rash, no skin breakdown, sensation grossly normal.    Laboratory:  Results for orders placed or performed in visit on 07/31/19   Folate   Result Value Ref Range    Folate >20.00 4.78 - 24.20 ng/mL   Vitamin B12   Result Value Ref Range    Vitamin B-12 877 211 - 946 pg/mL   T3   Result Value Ref Range "    T3, Total 113.0 80.0 - 200.0 ng/dl   T4, Free   Result Value Ref Range    Free T4 1.06 0.93 - 1.70 ng/dL   TSH   Result Value Ref Range    TSH 4.350 (H) 0.270 - 4.200 mIU/mL   Hemoglobin A1c   Result Value Ref Range    Hemoglobin A1C 7.20 (H) 4.80 - 5.60 %   ALT   Result Value Ref Range    ALT (SGPT) 18 1 - 33 U/L   Lipid Panel   Result Value Ref Range    Total Cholesterol 171 0 - 200 mg/dL    Triglycerides 169 (H) 0 - 150 mg/dL    HDL Cholesterol 43 40 - 60 mg/dL    VLDL Cholesterol 33.8 mg/dL    LDL Cholesterol  94 0 - 100 mg/dL   Basic Metabolic Panel   Result Value Ref Range    Glucose 147 (H) 65 - 99 mg/dL    BUN 23 8 - 23 mg/dL    Creatinine 1.24 (H) 0.57 - 1.00 mg/dL    eGFR Non African Am 41 (L) >60 mL/min/1.73    eGFR African Am 50 (L) >60 mL/min/1.73    BUN/Creatinine Ratio 18.5 7.0 - 25.0    Sodium 142 136 - 145 mmol/L    Potassium 4.5 3.5 - 5.2 mmol/L    Chloride 103 98 - 107 mmol/L    Total CO2 28.6 22.0 - 29.0 mmol/L    Calcium 9.8 8.6 - 10.5 mg/dL        Assessment/Plan        Discussed general issues about diabetes pathophysiology and management.  Started metformin; see  medication orders.  Follow up in 3 months or as needed.    Magnolia was seen today for hypertension, hyperlipidemia, depression and diabetes.    Diagnoses and all orders for this visit:    Memory problem    Mixed hyperlipidemia    Type 2 diabetes mellitus without complication, without long-term current use of insulin (CMS/[facility])  -     metFORMIN ER (GLUCOPHAGE-XR) 500 MG 24 hr tablet; Take 1 tablet by mouth Daily Before Lunch.  -     Hemoglobin A1c; Future    Subclinical hypothyroidism  -     levothyroxine (SYNTHROID) 50 MCG tablet; Take 1 tablet by mouth Daily.  -     TSH; Future  -     T4, Free; Future        Start metformin and synthroid today    Discussed healthy diabetic eating plan.  May refer to ADA web site, diabetes.org    Return in about 3 months (around 11/7/2019) for new medication follow up, diabetes.  There are no  Patient Instructions on file for this visit.  There are no discontinued medications.      Dr. Nathen Bailey MD  [place], [place]  [facility].

## 2019-09-05 ENCOUNTER — TELEPHONE (OUTPATIENT)
Dept: FAMILY MEDICINE CLINIC | Facility: CLINIC | Age: 84
End: 2019-09-05

## 2019-09-09 ENCOUNTER — TELEPHONE (OUTPATIENT)
Dept: FAMILY MEDICINE CLINIC | Facility: CLINIC | Age: 84
End: 2019-09-09

## 2019-09-09 DIAGNOSIS — G91.2 NPH (NORMAL PRESSURE HYDROCEPHALUS) (HCC): Primary | ICD-10-CM

## 2019-09-09 NOTE — TELEPHONE ENCOUNTER
Daughter said referral needs to be made, and if it can be scheduled on a Monday or Thursday would be best so she can take pt.

## 2019-09-19 ENCOUNTER — HOSPITAL ENCOUNTER (OUTPATIENT)
Dept: GENERAL RADIOLOGY | Facility: HOSPITAL | Age: 84
Discharge: HOME | End: 2019-09-19

## 2019-09-19 ENCOUNTER — HOSPITAL ENCOUNTER (OUTPATIENT)
Dept: INFUSION THERAPY | Facility: HOSPITAL | Age: 84
Discharge: HOME | End: 2019-09-19

## 2019-09-19 VITALS
HEART RATE: 67 BPM | RESPIRATION RATE: 18 BRPM | TEMPERATURE: 98.1 F | OXYGEN SATURATION: 95 % | DIASTOLIC BLOOD PRESSURE: 90 MMHG | SYSTOLIC BLOOD PRESSURE: 145 MMHG

## 2019-09-19 DIAGNOSIS — G91.2 NORMAL PRESSURE HYDROCEPHALUS (HCC): Primary | ICD-10-CM

## 2019-09-19 DIAGNOSIS — G91.2 NPH (NORMAL PRESSURE HYDROCEPHALUS) (HCC): ICD-10-CM

## 2019-09-19 NOTE — PATIENT INSTRUCTIONS
Call Dr. Nathen Bailey, [facility] at [phone]       STOP MELOXICAM FOR 1 WEEK. PROCEDURE RESCHEDULED FOR 09/26, ARRIVE @ [facility] @ 10 AM      Lumbar Puncture  A lumbar puncture, also called a spinal tap, is a procedure that is done to remove a small amount of the fluid that surrounds the brain and spinal cord (cerebrospinal fluid, CSF). The fluid is then examined in the lab. This procedure may be done to:  · Help diagnose various problems, such as meningitis, encephalitis, multiple sclerosis, and other infections.  · Remove fluid and relieve pressure that occurs with certain types of headaches.  · Look for bleeding within the brain and spinal cord areas (central nervous system).  · Place medicine into the spinal fluid.  Tell a health care provider about:  · Any allergies you have.  · All medicines you are taking, including vitamins, herbs, eye drops, creams, and over-the-counter medicines like aspirin or NSAIDs.  · Any problems you or family members have had with anesthetic medicines.  · Any blood disorders you have.  · Any surgeries you have had.  · Any medical conditions you have.  · Whether you are pregnant or may be pregnant.  What are the risks?  Generally, this is a safe procedure. However, problems may occur, including:  · Infection at the insertion site that can spread to the bone or spinal fluid.  · Bleeding.  · Spinal headache. This is a severe headache that occurs when there is a leak of spinal fluid.  · Leakage of CSF.  · Allergic reactions to medicines or dyes.  · Damage to other structures or organs.  What happens before the procedure?  Staying hydrated  Follow instructions from your health care provider about hydration, which may include:  · Up to 2 hours before the procedure - you may continue to drink clear liquids, such as water, clear fruit juice, black coffee, and plain tea.  Eating and drinking restrictions  Follow instructions from your  health care provider about eating and drinking. If you will be given a medicine to help you relax (sedative), these instructions may include:  · 8 hours before the procedure - stop eating heavy meals or foods such as meat, fried foods, or fatty foods.  · 6 hours before the procedure - stop eating light meals or foods, such as toast or cereal.  · 6 hours before the procedure - stop drinking milk or drinks that contain milk.  · 2 hours before the procedure - stop drinking clear liquids.  Medicines  · Ask your health care provider about:  ? Changing or stopping your regular medicines. This is especially important if you are taking diabetes medicines or blood thinners.  ? Taking medicines such as aspirin and ibuprofen. These medicines can thin your blood. Do not take these medicines before your procedure if your health care provider instructs you not to.  · You may be given antibiotic medicine to help prevent infection. If so, take the antibiotic as told by your health care provider.  General instructions  · You may have a blood sample taken.  · You may be asked to shower with a germ-killing soap.  · Ask your health care provider how your surgical site will be marked or identified.  · Plan to have someone take you home from the hospital or clinic. This is especially important if you will be given a sedative.  · If you will be going home right after the procedure, plan to have someone with you for 24 hours.  What happens during the procedure?    · You may lie down on your side with your knees bent, or you may sit with your head resting on a pillow on your lap.  ? How you are positioned depends on your age and size.  ? You will be positioned so that the spaces between the bones of the spine (vertebrae) are as wide as possible. This will make it easier to pass the needle into the spinal canal.  · The skin on your lower back (lumbar region) will be cleaned.  · You will be given an injection of medicine to numb your lower  back area (local anesthetic).  · You may be given pain medicine or a sedative.  · A small needle will be inserted into your lower back until it enters the space that contains the cerebrospinal fluid. The needle will not enter the spinal cord.  · Fluid will be collected into tubes. It will be sent to a lab for examination.  · The needle will be withdrawn, and a bandage (dressing) will be placed over the area.  The procedure may vary among health care providers and hospitals.  What happens after the procedure?  · Your blood pressure, heart rate, breathing rate, and blood oxygen level will be monitored until any medicines you were given have worn off.  · You may need to stay lying down for a while.  · You will need to drink plenty of fluids and caffeine to help prevent a headache.  · Do not drive for 24 hours if you received a sedative.  Summary  · A lumbar puncture, also called a spinal tap, is a procedure that is done to remove a small amount of the cerebrospinal fluid, CSF. This may be done to help diagnose a wide variety of conditions.  · Before the procedure, tell your health care provider about all medicines you are taking, including vitamins, herbs, eye drops, creams, and over-the-counter medicines.  · Before the procedure, ask your health care provider about changing or stopping your regular medicines. This is especially important if you are taking blood thinners.  · Your lower back will be numbed with an injection before the needle is placed into your spinal canal.  · After the procedure, you will lie down for a while and you will drink plenty of fluids.  This information is not intended to replace advice given to you by your health care provider. Make sure you discuss any questions you have with your health care provider.  Document Released: 12/15/2001 Document Revised: 01/31/2018 Document Reviewed: 01/31/2018  [employer] Patient Education © 2019 [employer]   if you have any problems or  "concerns.    We know you have a Choice in healthcare and appreciate you using [facility].  Our purpose is to provide you \"Excellent Care\".  We hope that you will always choose us in the future and continue to recommend us to your family and friends.              "

## 2019-09-26 ENCOUNTER — APPOINTMENT (OUTPATIENT)
Dept: GENERAL RADIOLOGY | Facility: HOSPITAL | Age: 84
End: 2019-09-26

## 2019-09-30 ENCOUNTER — HOSPITAL ENCOUNTER (OUTPATIENT)
Dept: GENERAL RADIOLOGY | Facility: HOSPITAL | Age: 84
Discharge: HOME | End: 2019-09-30
Admitting: RADIOLOGY

## 2019-09-30 VITALS
HEIGHT: 68 IN | DIASTOLIC BLOOD PRESSURE: 77 MMHG | BODY MASS INDEX: 26.98 KG/M2 | OXYGEN SATURATION: 97 % | HEART RATE: 70 BPM | RESPIRATION RATE: 16 BRPM | SYSTOLIC BLOOD PRESSURE: 163 MMHG | WEIGHT: 178 LBS

## 2019-09-30 DIAGNOSIS — G91.2 NPH (NORMAL PRESSURE HYDROCEPHALUS) (HCC): ICD-10-CM

## 2019-09-30 LAB
APPEARANCE CSF: CLEAR
APTT PPP: 29 SECONDS (ref 24.3–38.1)
COLOR CSF: COLORLESS
GLUCOSE CSF-MCNC: 77 MG/DL (ref 40–70)
INR PPP: 1.05 (ref 0.9–1.1)
PLATELET # BLD AUTO: 257 10*3/MM3 (ref 140–450)
PROT CSF-MCNC: 38 MG/DL (ref 15–45)
PROTHROMBIN TIME: 13.4 SECONDS (ref 12.1–15)
RBC # CSF MANUAL: 0 /MM3 (ref 0–0)
TUBE # CSF: 1
WBC # CSF MANUAL: 2 /MM3 (ref 0–5)

## 2019-09-30 PROCEDURE — 87205 SMEAR GRAM STAIN: CPT | Performed by: FAMILY MEDICINE

## 2019-09-30 PROCEDURE — 25010000003 LIDOCAINE 1 % SOLUTION: Performed by: FAMILY MEDICINE

## 2019-09-30 PROCEDURE — 89050 BODY FLUID CELL COUNT: CPT | Performed by: FAMILY MEDICINE

## 2019-09-30 PROCEDURE — 85049 AUTOMATED PLATELET COUNT: CPT | Performed by: RADIOLOGY

## 2019-09-30 PROCEDURE — 77003 FLUOROGUIDE FOR SPINE INJECT: CPT

## 2019-09-30 PROCEDURE — 87070 CULTURE OTHR SPECIMN AEROBIC: CPT | Performed by: FAMILY MEDICINE

## 2019-09-30 PROCEDURE — 82945 GLUCOSE OTHER FLUID: CPT | Performed by: FAMILY MEDICINE

## 2019-09-30 PROCEDURE — 85610 PROTHROMBIN TIME: CPT | Performed by: RADIOLOGY

## 2019-09-30 PROCEDURE — 87015 SPECIMEN INFECT AGNT CONCNTJ: CPT | Performed by: FAMILY MEDICINE

## 2019-09-30 PROCEDURE — 84157 ASSAY OF PROTEIN OTHER: CPT | Performed by: FAMILY MEDICINE

## 2019-09-30 PROCEDURE — 85730 THROMBOPLASTIN TIME PARTIAL: CPT | Performed by: RADIOLOGY

## 2019-09-30 RX ORDER — LIDOCAINE HYDROCHLORIDE 10 MG/ML
10 INJECTION, SOLUTION INFILTRATION; PERINEURAL ONCE
Status: COMPLETED | OUTPATIENT
Start: 2019-09-30 | End: 2019-09-30

## 2019-09-30 RX ADMIN — LIDOCAINE HYDROCHLORIDE 10 ML: 10 INJECTION, SOLUTION INFILTRATION; PERINEURAL at 12:00

## 2019-10-03 LAB
BACTERIA SPEC AEROBE CULT: NORMAL
GRAM STN SPEC: NORMAL
GRAM STN SPEC: NORMAL

## 2019-11-01 ENCOUNTER — RESULTS ENCOUNTER (OUTPATIENT)
Dept: FAMILY MEDICINE CLINIC | Facility: CLINIC | Age: 84
End: 2019-11-01

## 2019-11-01 DIAGNOSIS — E11.9 TYPE 2 DIABETES MELLITUS WITHOUT COMPLICATION, WITHOUT LONG-TERM CURRENT USE OF INSULIN (HCC): ICD-10-CM

## 2019-11-01 DIAGNOSIS — F41.0 PANIC ATTACK: ICD-10-CM

## 2019-11-01 DIAGNOSIS — E03.8 SUBCLINICAL HYPOTHYROIDISM: ICD-10-CM

## 2019-11-01 RX ORDER — ALPRAZOLAM 0.5 MG/1
TABLET ORAL
Qty: 30 TABLET | Refills: 5 | Status: SHIPPED | OUTPATIENT
Start: 2019-11-01 | End: 2020-06-15

## 2019-11-08 ENCOUNTER — OFFICE VISIT (OUTPATIENT)
Dept: FAMILY MEDICINE CLINIC | Facility: CLINIC | Age: 84
End: 2019-11-08

## 2019-11-08 VITALS
OXYGEN SATURATION: 98 % | BODY MASS INDEX: 26.83 KG/M2 | WEIGHT: 177 LBS | SYSTOLIC BLOOD PRESSURE: 132 MMHG | HEART RATE: 61 BPM | DIASTOLIC BLOOD PRESSURE: 84 MMHG | HEIGHT: 68 IN

## 2019-11-08 DIAGNOSIS — E11.9 TYPE 2 DIABETES MELLITUS WITHOUT COMPLICATION, WITHOUT LONG-TERM CURRENT USE OF INSULIN (HCC): Primary | ICD-10-CM

## 2019-11-08 DIAGNOSIS — E03.8 SUBCLINICAL HYPOTHYROIDISM: ICD-10-CM

## 2019-11-08 DIAGNOSIS — F41.0 PANIC ATTACK: ICD-10-CM

## 2019-11-08 DIAGNOSIS — R41.3 MEMORY PROBLEM: ICD-10-CM

## 2019-11-08 DIAGNOSIS — M17.0 PRIMARY OSTEOARTHRITIS OF BOTH KNEES: ICD-10-CM

## 2019-11-08 PROCEDURE — 99214 OFFICE O/P EST MOD 30 MIN: CPT | Performed by: FAMILY MEDICINE

## 2019-11-08 NOTE — PROGRESS NOTES
Chief Complaint   Patient presents with   • Diabetes       Subjective   Magnolia Rios is an 85 y.o. female who presents for follow up of diabetes. Current symptoms include: none. Patient denies foot ulcerations, hyperglycemia, hypoglycemia , paresthesia of the feet and visual disturbances. Evaluation to date has included: fasting blood sugar, fasting lipid panel, hemoglobin A1C and microalbuminuria. Home sugars: patient does not check sugars. Current treatments: more intensive attention to diet which has been somewhat effective, Continued metformin which has been too early to assess effectiveness and Continued statin which has been too early to assess effectiveness. Discussed importance of yearly eye exams and checking feet for skin integrity.    Follow up lumbar puncture to test for normal pressure hydrocephalus:    Lumbar puncture under fluoroscopic guidance 09/30/2019     INDICATION: Memory loss and gait abnormalities within the past 6 months.  Normal pressure hydrocephalus. Head CT 06/13/2018 demonstrated slight  enlargement of the ventricles.     FINDINGS: The risks and benefits of the procedure were discussed with  the patient and she agreed to proceed. The L3 vertebral body was  localized fluoroscopically and the patient was prepped and draped in  usual sterile fashion. The skin was anesthetized with lidocaine.  Subsequently, a 20-gauge spinal needle was advanced into the lumbar  subarachnoid space under fluoroscopic guidance by myself. Opening  pressure was 5 cm of water. 15 cc of clear CSF were obtained and sent to  the laboratory for diagnostic testing, as per clinician request. A  single fluoroscopic spot film radiograph of the lumbar spine was  obtained in the anterior projection and 0.8 minutes of fluoroscopy time  was utilized. The needle was then removed. The patient tolerated the  procedure well. There are no immediate complications.     IMPRESSION:  Technically successful lumbar puncture under  "fluoroscopic  guidance. Opening pressure was 5 cm of water. No immediate  complications.     This report was finalized on 9/30/2019 1:02 PM by Dr. Garfield Villagomez MD.               The following portions of the patient's history were reviewed and updated as appropriate: allergies, current medications, past family history, past medical history, past social history, past surgical history and problem list.        Review of Systems  A comprehensive review of systems was negative except for: Genitourinary: positive for urinary incontinence  Musculoskeletal: positive for arthralgias, back pain and stiff joints  Neurological: positive for coordination problems, gait problems and memory problems  Behavioral/Psych: positive for aggressive behavior, anxiety and mood swings     Vitals:    11/08/19 1057   BP: 132/84   BP Location: Left arm   Patient Position: Sitting   Cuff Size: Adult   Pulse: 61   SpO2: 98%   Weight: 80.3 kg (177 lb)   Height: 172.7 cm (67.99\")       Objective    Gen:  Alert, pleasant  Ears: canals clear, TMs normal  Throat: clear , no thrush, teeth ok  Neck: no bruit, no LAD  Lungs: clear  Heart: RR no murmur  Feet:  No rash, no skin breakdown, sensation grossly normal.    Laboratory:  Results for orders placed or performed during the hospital encounter of 09/30/19   Culture, CSF - Cerebrospinal Fluid, Lumbar Puncture   Result Value Ref Range    CSF Culture No growth at 3 days     Gram Stain No organisms seen     Gram Stain No WBCs seen    Protime-INR   Result Value Ref Range    Protime 13.4 12.1 - 15.0 Seconds    INR 1.05 0.90 - 1.10   Platelet Count   Result Value Ref Range    Platelets 257 140 - 450 10*3/mm3   aPTT   Result Value Ref Range    PTT 29.0 24.3 - 38.1 seconds   Glucose, CSF - Cerebrospinal Fluid, Lumbar Puncture   Result Value Ref Range    Glucose, CSF 77 (H) 40 - 70 mg/dL   Protein, CSF - Cerebrospinal Fluid, Lumbar Puncture   Result Value Ref Range    Protein, Total (CSF) 38.0 15.0 - 45.0 " mg/dL   Cell Count, CSF - Cerebrospinal Fluid, Lumbar Puncture   Result Value Ref Range    WBC, CSF 2 0 - 5 /mm3    RBC, CSF 0 0 - 0 /mm3    Color, CSF Colorless Colorless    Appearance, CSF Clear Clear    Tube Number, CSF 1         Assessment/Plan        Discussed general issues about diabetes pathophysiology and management.  Continued metformin; see  medication orders.  Continued statin drug see medication orders.    Magnolia was seen today for diabetes.    Diagnoses and all orders for this visit:    Type 2 diabetes mellitus without complication, without long-term current use of insulin (CMS/[facility])    Subclinical hypothyroidism    Primary osteoarthritis of both knees    Panic attack    Memory problem      So with a opening pressure of only 5 on her LP  And not much improvement post procedure in regards to memory , unsteady gait or urinary incontinence.  Very doubtful she has NPH    Daughter again states taking the one xanax a day calms her nerves and she is much more pleasant and less forgetful.    Labs today.    Patient has been prescribed controlled medications.  Treatment discussed  KATHRYN updated and reviewed.  Risk and Benefits discussed.  Per [place].    Discussed healthy diabetic eating plan.  May refer to ADA web site, diabetes.org    Return in about 4 months (around 3/8/2020) for diabetes.  There are no Patient Instructions on file for this visit.  Medications Discontinued During This Encounter   Medication Reason   • ALPRAZolam (XANAX) 0.5 MG tablet Duplicate order         Dr. Nathen Bailey MD  [place], [place]  [facility].

## 2019-11-09 LAB
HBA1C MFR BLD: 6.7 % (ref 4.8–5.6)
T4 FREE SERPL-MCNC: 1.23 NG/DL (ref 0.93–1.7)
TSH SERPL DL<=0.005 MIU/L-ACNC: 1.89 UIU/ML (ref 0.27–4.2)

## 2019-12-22 DIAGNOSIS — M17.0 PRIMARY OSTEOARTHRITIS OF BOTH KNEES: ICD-10-CM

## 2019-12-23 RX ORDER — MELOXICAM 15 MG/1
TABLET ORAL
Qty: 30 TABLET | Refills: 0 | Status: SHIPPED | OUTPATIENT
Start: 2019-12-23 | End: 2020-01-20

## 2020-01-03 DIAGNOSIS — F41.0 PANIC ATTACK: ICD-10-CM

## 2020-01-03 RX ORDER — FLUOXETINE HYDROCHLORIDE 20 MG/1
CAPSULE ORAL
Qty: 90 CAPSULE | Refills: 0 | Status: SHIPPED | OUTPATIENT
Start: 2020-01-03 | End: 2020-02-14 | Stop reason: SDUPTHER

## 2020-01-19 DIAGNOSIS — M17.0 PRIMARY OSTEOARTHRITIS OF BOTH KNEES: ICD-10-CM

## 2020-01-20 RX ORDER — MELOXICAM 15 MG/1
TABLET ORAL
Qty: 30 TABLET | Refills: 0 | Status: SHIPPED | OUTPATIENT
Start: 2020-01-20 | End: 2020-02-14 | Stop reason: SDUPTHER

## 2020-02-11 DIAGNOSIS — E78.2 MIXED HYPERLIPIDEMIA: ICD-10-CM

## 2020-02-11 RX ORDER — SIMVASTATIN 20 MG
TABLET ORAL
Qty: 90 TABLET | Refills: 0 | Status: SHIPPED | OUTPATIENT
Start: 2020-02-11 | End: 2020-02-14 | Stop reason: SDUPTHER

## 2020-02-11 RX ORDER — EZETIMIBE 10 MG/1
TABLET ORAL
Qty: 90 TABLET | Refills: 0 | Status: SHIPPED | OUTPATIENT
Start: 2020-02-11 | End: 2020-02-14 | Stop reason: SDUPTHER

## 2020-02-14 ENCOUNTER — OFFICE VISIT (OUTPATIENT)
Dept: FAMILY MEDICINE CLINIC | Facility: CLINIC | Age: 85
End: 2020-02-14

## 2020-02-14 VITALS
WEIGHT: 179 LBS | SYSTOLIC BLOOD PRESSURE: 120 MMHG | BODY MASS INDEX: 27.13 KG/M2 | HEIGHT: 68 IN | OXYGEN SATURATION: 98 % | HEART RATE: 73 BPM | DIASTOLIC BLOOD PRESSURE: 78 MMHG

## 2020-02-14 DIAGNOSIS — M17.0 PRIMARY OSTEOARTHRITIS OF BOTH KNEES: ICD-10-CM

## 2020-02-14 DIAGNOSIS — E11.9 TYPE 2 DIABETES MELLITUS WITHOUT COMPLICATION, WITHOUT LONG-TERM CURRENT USE OF INSULIN (HCC): Primary | ICD-10-CM

## 2020-02-14 DIAGNOSIS — F41.0 PANIC ATTACK: ICD-10-CM

## 2020-02-14 DIAGNOSIS — F33.42 RECURRENT MAJOR DEPRESSIVE DISORDER, IN FULL REMISSION (HCC): ICD-10-CM

## 2020-02-14 DIAGNOSIS — E03.8 SUBCLINICAL HYPOTHYROIDISM: ICD-10-CM

## 2020-02-14 DIAGNOSIS — E78.2 MIXED HYPERLIPIDEMIA: ICD-10-CM

## 2020-02-14 DIAGNOSIS — Z51.81 MEDICATION MONITORING ENCOUNTER: ICD-10-CM

## 2020-02-14 DIAGNOSIS — R41.3 MEMORY PROBLEM: ICD-10-CM

## 2020-02-14 PROCEDURE — 99214 OFFICE O/P EST MOD 30 MIN: CPT | Performed by: FAMILY MEDICINE

## 2020-02-14 RX ORDER — MELOXICAM 15 MG/1
15 TABLET ORAL DAILY
Qty: 90 TABLET | Refills: 3 | Status: SHIPPED | OUTPATIENT
Start: 2020-02-14 | End: 2021-01-16

## 2020-02-14 RX ORDER — MEMANTINE HYDROCHLORIDE 10 MG/1
10 TABLET ORAL EVERY MORNING
Qty: 90 TABLET | Refills: 1 | Status: SHIPPED | OUTPATIENT
Start: 2020-02-14 | End: 2020-07-20

## 2020-02-14 RX ORDER — SIMVASTATIN 20 MG
20 TABLET ORAL NIGHTLY
Qty: 90 TABLET | Refills: 3 | Status: SHIPPED | OUTPATIENT
Start: 2020-02-14 | End: 2021-04-13

## 2020-02-14 RX ORDER — EZETIMIBE 10 MG/1
10 TABLET ORAL DAILY
Qty: 90 TABLET | Refills: 2 | Status: SHIPPED | OUTPATIENT
Start: 2020-02-14 | End: 2021-01-13

## 2020-02-14 RX ORDER — FLUOXETINE HYDROCHLORIDE 20 MG/1
20 CAPSULE ORAL DAILY
Qty: 90 CAPSULE | Refills: 2 | Status: SHIPPED | OUTPATIENT
Start: 2020-02-14 | End: 2020-12-07

## 2020-02-14 NOTE — PROGRESS NOTES
"  Chief Complaint   Patient presents with   • Diabetes   • Hyperlipidemia   • Hypothyroidism   • Anxiety       Subjective   Magnolia Rios is an 85 y.o. female who presents for follow up of diabetes. Current symptoms include: none. Patient denies foot ulcerations, hyperglycemia, hypoglycemia , paresthesia of the feet and visual disturbances. Evaluation to date has included: hemoglobin A1C. Home sugars: patient does not check sugars. Current treatments: Continued metformin which has been somewhat effective. Discussed importance of yearly eye exams and checking feet for skin integrity.    Daughter reports dementia is much worse  They have taken the car keys away as she is unsafe  She is becoming angry and resentful toward her family.    The following portions of the patient's history were reviewed and updated as appropriate: allergies, current medications, past family history, past medical history, past social history, past surgical history and problem list.        Review of Systems  A comprehensive review of systems was negative except for: Neurological: positive for coordination problems, gait problems, headaches, memory problems and weakness  Behavioral/Psych: positive for aggressive behavior, bad mood, mood swings and sleep disturbance     Vitals:    02/14/20 1233   BP: 120/78   BP Location: Left arm   Patient Position: Sitting   Cuff Size: Adult   Pulse: 73   SpO2: 98%   Weight: 81.2 kg (179 lb)   Height: 172.7 cm (67.99\")       Objective    Gen:  Alert, pleasant  Ears: canals clear, TMs normal  Throat: clear , no thrush, teeth ok  Neck: no bruit, no LAD  Lungs: clear  Heart: RR no murmur  Feet:  No rash, no skin breakdown, sensation grossly normal.    Laboratory:  Results for orders placed or performed in visit on 11/01/19   TSH   Result Value Ref Range    TSH 1.890 0.270 - 4.200 uIU/mL   T4, Free   Result Value Ref Range    Free T4 1.23 0.93 - 1.70 ng/dL   Hemoglobin A1c   Result Value Ref Range    " Hemoglobin A1C 6.70 (H) 4.80 - 5.60 %        Assessment/Plan        Discussed general issues about diabetes pathophysiology and management.  Continued metformin; see  medication orders.  Follow up in 3 months or as needed.    Magnolia was seen today for diabetes, hyperlipidemia, hypothyroidism and anxiety.    Diagnoses and all orders for this visit:    Type 2 diabetes mellitus without complication, without long-term current use of insulin (CMS/[facility])  -     Basic Metabolic Panel  -     Hemoglobin A1c    Subclinical hypothyroidism  -     TSH    Mixed hyperlipidemia  -     LDL Cholesterol, Direct  -     ezetimibe (ZETIA) 10 MG tablet; Take 1 tablet by mouth Daily.  -     simvastatin (ZOCOR) 20 MG tablet; Take 1 tablet by mouth Every Night.    Recurrent major depressive disorder, in full remission (CMS/[facility])    Medication monitoring encounter  -     Compliance Drug Analysis, Ur - Urine, Clean Catch    Panic attack  -     FLUoxetine (PROzac) 20 MG capsule; Take 1 capsule by mouth Daily.    Primary osteoarthritis of both knees  -     meloxicam (MOBIC) 15 MG tablet; Take 1 tablet by mouth Daily. Indications: Joint Damage causing Pain and Loss of Function    Memory problem  -     memantine (NAMENDA) 10 MG tablet; Take 1 tablet by mouth Every Morning.      Start Namenda today.      Discussed healthy diabetic eating plan.  May refer to ADA web site, diabetes.org    Return in about 3 months (around 5/14/2020) for diabetes, new medication follow up.  There are no Patient Instructions on file for this visit.  Medications Discontinued During This Encounter   Medication Reason   • ezetimibe (ZETIA) 10 MG tablet Reorder   • FLUoxetine (PROzac) 20 MG capsule Reorder   • meloxicam (MOBIC) 15 MG tablet Reorder   • simvastatin (ZOCOR) 20 MG tablet Reorder         Dr. Nathen Bailey MD  [place], [place]  [facility].

## 2020-02-17 LAB
BUN SERPL-MCNC: 26 MG/DL (ref 8–27)
BUN/CREAT SERPL: 21 (ref 12–28)
CALCIUM SERPL-MCNC: 10.1 MG/DL (ref 8.7–10.3)
CHLORIDE SERPL-SCNC: 102 MMOL/L (ref 96–106)
CO2 SERPL-SCNC: 26 MMOL/L (ref 20–29)
CREAT SERPL-MCNC: 1.25 MG/DL (ref 0.57–1)
GLUCOSE SERPL-MCNC: 114 MG/DL (ref 65–99)
HBA1C MFR BLD: 6.8 % (ref 4.8–5.6)
LDLC SERPL DIRECT ASSAY-MCNC: 106 MG/DL (ref 0–99)
POTASSIUM SERPL-SCNC: 4.9 MMOL/L (ref 3.5–5.2)
SODIUM SERPL-SCNC: 139 MMOL/L (ref 134–144)
SPECIMEN STATUS: NORMAL
TSH SERPL DL<=0.005 MIU/L-ACNC: 1.6 UIU/ML (ref 0.45–4.5)

## 2020-04-30 ENCOUNTER — TELEPHONE (OUTPATIENT)
Dept: FAMILY MEDICINE CLINIC | Facility: CLINIC | Age: 85
End: 2020-04-30

## 2020-04-30 NOTE — TELEPHONE ENCOUNTER
Pt has an appointment on 5/11/2020 for diabetes f/u. Does she need to be seen in office or should it be a tele visit? [facility]

## 2020-05-01 ENCOUNTER — OFFICE VISIT (OUTPATIENT)
Dept: FAMILY MEDICINE CLINIC | Facility: CLINIC | Age: 85
End: 2020-05-01

## 2020-05-01 VITALS
TEMPERATURE: 99.3 F | SYSTOLIC BLOOD PRESSURE: 140 MMHG | OXYGEN SATURATION: 97 % | HEART RATE: 80 BPM | WEIGHT: 179 LBS | BODY MASS INDEX: 27.13 KG/M2 | DIASTOLIC BLOOD PRESSURE: 78 MMHG | HEIGHT: 68 IN

## 2020-05-01 DIAGNOSIS — R42 VERTIGO: Primary | ICD-10-CM

## 2020-05-01 PROCEDURE — 99213 OFFICE O/P EST LOW 20 MIN: CPT | Performed by: FAMILY MEDICINE

## 2020-05-01 RX ORDER — MECLIZINE HYDROCHLORIDE 25 MG/1
25 TABLET ORAL 3 TIMES DAILY PRN
Qty: 30 TABLET | Refills: 0 | Status: SHIPPED | OUTPATIENT
Start: 2020-05-01 | End: 2020-11-03 | Stop reason: SDUPTHER

## 2020-05-01 NOTE — PROGRESS NOTES
Subjective   Magnolia Rios is a 85 y.o. female who is here for   Chief Complaint   Patient presents with   • Dizziness   .     History of Present Illness   Vertigo - Dizziness: Patient presents with dizziness .  The dizziness has been present for 2 weeks. The patient describes the symptoms as disequalibirum and vertigo. Symptoms are exacerbated by rapid head movements, looking  upward, rolling over in bed, rising from supine position, rising from squatting or sitting position and bending The patient also complains of none. Patient denies aural pressure  otalgia  otorrhea  tinnitus  hearing loss.  She has been treated with allergy med with some improvement.  Previous work up has been none.  Room spinning sensation with head movement  No injury  Did fall once due to vertigo        The following portions of the patient's history were reviewed and updated as appropriate: allergies, current medications, past family history, past medical history, past social history, past surgical history and problem list.    Review of Systems   HENT: Positive for congestion. Negative for ear discharge, ear pain, facial swelling, hearing loss, postnasal drip, rhinorrhea, sinus pressure, sinus pain, sneezing, sore throat, tinnitus, trouble swallowing and voice change.    Respiratory: Negative for shortness of breath.    Cardiovascular: Negative for chest pain and palpitations.   Neurological: Positive for dizziness. Negative for tremors, seizures, syncope, facial asymmetry, speech difficulty, weakness, light-headedness, numbness and headaches.       Objective   Physical Exam   Constitutional: She appears well-developed and well-nourished. No distress.   HENT:   Right Ear: External ear and ear canal normal. Tympanic membrane is bulging.   Left Ear: Tympanic membrane, external ear and ear canal normal.   Neck: Carotid bruit is not present.   Cardiovascular: Normal rate and normal heart sounds.   Pulmonary/Chest: Effort normal and  breath sounds normal.   Nursing note and vitals reviewed.      Assessment/Plan   Magnolia was seen today for dizziness.    Diagnoses and all orders for this visit:    Vertigo  -     meclizine (ANTIVERT) 25 MG tablet; Take 1 tablet by mouth 3 (Three) Times a Day As Needed for Dizziness.      There are no Patient Instructions on file for this visit.    There are no discontinued medications.     Return in about 6 months (around 11/1/2020) for Medicare Wellness visit.    Dr. Nathen Bailey  [place], [place]

## 2020-06-13 DIAGNOSIS — F41.0 PANIC ATTACK: ICD-10-CM

## 2020-06-15 RX ORDER — ALPRAZOLAM 0.5 MG/1
TABLET ORAL
Qty: 30 TABLET | Refills: 4 | Status: SHIPPED | OUTPATIENT
Start: 2020-06-15 | End: 2021-03-10

## 2020-07-20 DIAGNOSIS — R41.3 MEMORY PROBLEM: ICD-10-CM

## 2020-07-20 RX ORDER — MEMANTINE HYDROCHLORIDE 10 MG/1
TABLET ORAL
Qty: 90 TABLET | Refills: 0 | Status: SHIPPED | OUTPATIENT
Start: 2020-07-20 | End: 2020-10-18

## 2020-07-28 DIAGNOSIS — E11.9 TYPE 2 DIABETES MELLITUS WITHOUT COMPLICATION, WITHOUT LONG-TERM CURRENT USE OF INSULIN (HCC): ICD-10-CM

## 2020-07-28 RX ORDER — METFORMIN HYDROCHLORIDE 500 MG/1
TABLET, EXTENDED RELEASE ORAL
Qty: 90 TABLET | Refills: 0 | Status: SHIPPED | OUTPATIENT
Start: 2020-07-28 | End: 2020-11-03 | Stop reason: SDUPTHER

## 2020-10-18 DIAGNOSIS — R41.3 MEMORY PROBLEM: ICD-10-CM

## 2020-10-18 RX ORDER — MEMANTINE HYDROCHLORIDE 10 MG/1
TABLET ORAL
Qty: 90 TABLET | Refills: 0 | Status: SHIPPED | OUTPATIENT
Start: 2020-10-18 | End: 2021-01-19 | Stop reason: SDUPTHER

## 2020-10-23 DIAGNOSIS — E11.9 TYPE 2 DIABETES MELLITUS WITHOUT COMPLICATION, WITHOUT LONG-TERM CURRENT USE OF INSULIN (HCC): ICD-10-CM

## 2020-10-23 DIAGNOSIS — Z51.81 MEDICATION MONITORING ENCOUNTER: ICD-10-CM

## 2020-10-23 DIAGNOSIS — E78.2 MIXED HYPERLIPIDEMIA: Primary | ICD-10-CM

## 2020-10-23 DIAGNOSIS — E03.8 SUBCLINICAL HYPOTHYROIDISM: ICD-10-CM

## 2020-10-24 ENCOUNTER — RESULTS ENCOUNTER (OUTPATIENT)
Dept: FAMILY MEDICINE CLINIC | Facility: CLINIC | Age: 85
End: 2020-10-24

## 2020-10-24 DIAGNOSIS — E11.9 TYPE 2 DIABETES MELLITUS WITHOUT COMPLICATION, WITHOUT LONG-TERM CURRENT USE OF INSULIN (HCC): ICD-10-CM

## 2020-10-24 DIAGNOSIS — Z51.81 MEDICATION MONITORING ENCOUNTER: ICD-10-CM

## 2020-10-28 ENCOUNTER — RESULTS ENCOUNTER (OUTPATIENT)
Dept: FAMILY MEDICINE CLINIC | Facility: CLINIC | Age: 85
End: 2020-10-28

## 2020-10-28 DIAGNOSIS — E11.9 TYPE 2 DIABETES MELLITUS WITHOUT COMPLICATION, WITHOUT LONG-TERM CURRENT USE OF INSULIN (HCC): ICD-10-CM

## 2020-10-28 DIAGNOSIS — E03.8 SUBCLINICAL HYPOTHYROIDISM: ICD-10-CM

## 2020-10-28 DIAGNOSIS — E78.2 MIXED HYPERLIPIDEMIA: ICD-10-CM

## 2020-10-28 LAB
BUN SERPL-MCNC: 24 MG/DL (ref 8–23)
BUN/CREAT SERPL: 18.9 (ref 7–25)
CALCIUM SERPL-MCNC: 9.7 MG/DL (ref 8.6–10.5)
CHLORIDE SERPL-SCNC: 106 MMOL/L (ref 98–107)
CHOLEST SERPL-MCNC: 172 MG/DL (ref 0–200)
CO2 SERPL-SCNC: 27 MMOL/L (ref 22–29)
CREAT SERPL-MCNC: 1.27 MG/DL (ref 0.57–1)
ERYTHROCYTE [DISTWIDTH] IN BLOOD BY AUTOMATED COUNT: 12.9 % (ref 12.3–15.4)
GLUCOSE SERPL-MCNC: 142 MG/DL (ref 65–99)
HBA1C MFR BLD: 6.9 % (ref 4.8–5.6)
HCT VFR BLD AUTO: 40.6 % (ref 34–46.6)
HDLC SERPL-MCNC: 53 MG/DL (ref 40–60)
HGB BLD-MCNC: 13.4 G/DL (ref 12–15.9)
LDLC SERPL CALC-MCNC: 93 MG/DL (ref 0–100)
MCH RBC QN AUTO: 29.4 PG (ref 26.6–33)
MCHC RBC AUTO-ENTMCNC: 33 G/DL (ref 31.5–35.7)
MCV RBC AUTO: 89 FL (ref 79–97)
PLATELET # BLD AUTO: 278 10*3/MM3 (ref 140–450)
POTASSIUM SERPL-SCNC: 4.9 MMOL/L (ref 3.5–5.2)
RBC # BLD AUTO: 4.56 10*6/MM3 (ref 3.77–5.28)
SODIUM SERPL-SCNC: 142 MMOL/L (ref 136–145)
TRIGL SERPL-MCNC: 148 MG/DL (ref 0–150)
TSH SERPL DL<=0.005 MIU/L-ACNC: 4.05 UIU/ML (ref 0.27–4.2)
VLDLC SERPL CALC-MCNC: 26 MG/DL (ref 5–40)
WBC # BLD AUTO: 7.44 10*3/MM3 (ref 3.4–10.8)

## 2020-11-03 ENCOUNTER — OFFICE VISIT (OUTPATIENT)
Dept: FAMILY MEDICINE CLINIC | Facility: CLINIC | Age: 85
End: 2020-11-03

## 2020-11-03 VITALS
HEIGHT: 68 IN | SYSTOLIC BLOOD PRESSURE: 130 MMHG | WEIGHT: 174 LBS | OXYGEN SATURATION: 98 % | HEART RATE: 72 BPM | DIASTOLIC BLOOD PRESSURE: 86 MMHG | BODY MASS INDEX: 26.37 KG/M2

## 2020-11-03 DIAGNOSIS — Z00.00 MEDICARE ANNUAL WELLNESS VISIT, SUBSEQUENT: Primary | ICD-10-CM

## 2020-11-03 DIAGNOSIS — Z12.31 ENCOUNTER FOR SCREENING MAMMOGRAM FOR MALIGNANT NEOPLASM OF BREAST: ICD-10-CM

## 2020-11-03 DIAGNOSIS — R42 VERTIGO: ICD-10-CM

## 2020-11-03 DIAGNOSIS — E11.9 TYPE 2 DIABETES MELLITUS WITHOUT COMPLICATION, WITHOUT LONG-TERM CURRENT USE OF INSULIN (HCC): ICD-10-CM

## 2020-11-03 PROCEDURE — G0439 PPPS, SUBSEQ VISIT: HCPCS | Performed by: FAMILY MEDICINE

## 2020-11-03 PROCEDURE — 96160 PT-FOCUSED HLTH RISK ASSMT: CPT | Performed by: FAMILY MEDICINE

## 2020-11-03 RX ORDER — METFORMIN HYDROCHLORIDE 500 MG/1
500 TABLET, EXTENDED RELEASE ORAL
Qty: 30 TABLET | Refills: 0 | Status: SHIPPED | OUTPATIENT
Start: 2020-11-03 | End: 2020-11-03 | Stop reason: SDUPTHER

## 2020-11-03 RX ORDER — MECLIZINE HYDROCHLORIDE 25 MG/1
25 TABLET ORAL 3 TIMES DAILY PRN
Qty: 30 TABLET | Refills: 0 | Status: SHIPPED | OUTPATIENT
Start: 2020-11-03 | End: 2021-08-19 | Stop reason: HOSPADM

## 2020-11-03 RX ORDER — METFORMIN HYDROCHLORIDE 500 MG/1
500 TABLET, EXTENDED RELEASE ORAL
Qty: 90 TABLET | Refills: 3 | Status: SHIPPED | OUTPATIENT
Start: 2020-11-03

## 2020-11-03 NOTE — PROGRESS NOTES
The ABCs of the Annual Wellness Visit  Subsequent Medicare Wellness Visit    Chief Complaint   Patient presents with   • Medicare Wellness-subsequent       Subjective   History of Present Illness:  Magnolia Rios is a 86 y.o. female who presents for a Subsequent Medicare Wellness Visit.    HEALTH RISK ASSESSMENT    Recent Hospitalizations:  No hospitalization(s) within the last year.    Current Medical Providers:  Patient Care Team:  Nathen Bailey MD as PCP - General (Family Medicine)  Enrico Palacios MD as Surgeon (Orthopedic Surgery)    Smoking Status:  Social History     Tobacco Use   Smoking Status Former Smoker   • Types: Cigarettes   • Quit date: 1989   • Years since quittin.1   Smokeless Tobacco Never Used       Alcohol Consumption:  Social History     Substance and Sexual Activity   Alcohol Use Yes   • Alcohol/week: 1.0 standard drinks   • Types: 1 Cans of beer per week   • Frequency: Monthly or less   • Drinks per session: 1 or 2    Comment: occ       Depression Screen:   PHQ-2/PHQ-9 Depression Screening 11/3/2020   Little interest or pleasure in doing things 1   Feeling down, depressed, or hopeless 1   Trouble falling or staying asleep, or sleeping too much 0   Feeling tired or having little energy 1   Poor appetite or overeating 0   Feeling bad about yourself - or that you are a failure or have let yourself or your family down 0   Trouble concentrating on things, such as reading the newspaper or watching television 1   Moving or speaking so slowly that other people could have noticed. Or the opposite - being so fidgety or restless that you have been moving around a lot more than usual 3   Thoughts that you would be better off dead, or of hurting yourself in some way 0   Total Score 7   If you checked off any problems, how difficult have these problems made it for you to do your work, take care of things at home, or get along with other people? -       Fall Risk Screen:  ITZELADI Fall Risk  Assessment was completed, and patient is at MODERATE risk for falls. Assessment completed on:2/14/2020    Health Habits and Functional and Cognitive Screening:  Functional & Cognitive Status 11/3/2020   Do you have difficulty preparing food and eating? No   Do you have difficulty bathing yourself, getting dressed or grooming yourself? No   Do you have difficulty using the toilet? No   Do you have difficulty moving around from place to place? No   Do you have trouble with steps or getting out of a bed or a chair? No   Current Diet Well Balanced Diet   Dental Exam Up to date   Eye Exam Up to date   Exercise (times per week) 0 times per week   Current Exercises Include No Regular Exercise   Current Exercise Activities Include -   Do you need help using the phone?  No   Are you deaf or do you have serious difficulty hearing?  No   Do you need help with transportation? No   Do you need help shopping? Yes   Do you need help preparing meals?  Yes   Do you need help with housework?  Yes   Do you need help with laundry? Yes   Do you need help taking your medications? Yes   Do you need help managing money? Yes   Do you ever drive or ride in a car without wearing a seat belt? No   Have you felt unusual stress, anger or loneliness in the last month? No   Who do you live with? Spouse   If you need help, do you have trouble finding someone available to you? No   Have you been bothered in the last four weeks by sexual problems? No   Do you have difficulty concentrating, remembering or making decisions? Yes         Does the patient have evidence of cognitive impairment? Yes    Asprin use counseling:Does not need ASA (and currently is not on it)    Age-appropriate Screening Schedule:  Refer to the list below for future screening recommendations based on patient's age, sex and/or medical conditions. Orders for these recommended tests are listed in the plan section. The patient has been provided with a written plan.    Health  Maintenance   Topic Date Due   • ZOSTER VACCINE (2 of 2) 03/27/2018   • DIABETIC EYE EXAM  05/31/2018   • URINE MICROALBUMIN  02/06/2020   • INFLUENZA VACCINE  08/01/2020   • HEMOGLOBIN A1C  04/27/2021   • LIPID PANEL  10/27/2021   • TDAP/TD VACCINES  Discontinued          The following portions of the patient's history were reviewed and updated as appropriate: allergies, current medications, past family history, past medical history, past social history, past surgical history and problem list.    Outpatient Medications Prior to Visit   Medication Sig Dispense Refill   • ALPRAZolam (XANAX) 0.5 MG tablet TAKE ONE TABLET BY MOUTH EVERY NIGHT AT BEDTIME AS NEEDED FOR ANXIETY 30 tablet 4   • Bioflavonoid Products (SHELBIE C PO) Take  by mouth.     • cholecalciferol 2000 UNITS tablet Take 2,000 Units by mouth Daily. 30 tablet 1   • ezetimibe (ZETIA) 10 MG tablet Take 1 tablet by mouth Daily. 90 tablet 2   • FLUoxetine (PROzac) 20 MG capsule Take 1 capsule by mouth Daily. 90 capsule 2   • meloxicam (MOBIC) 15 MG tablet Take 1 tablet by mouth Daily. Indications: Joint Damage causing Pain and Loss of Function 90 tablet 3   • memantine (NAMENDA) 10 MG tablet TAKE 1 TABLET EVERY MORNING 90 tablet 0   • Multiple Vitamins-Minerals (MULTIVITAMIN WITH MINERALS) tablet tablet Take 1 tablet by mouth Daily.     • simvastatin (ZOCOR) 20 MG tablet Take 1 tablet by mouth Every Night. 90 tablet 3   • vitamin C (ASCORBIC ACID) 250 MG tablet Take 250 mg by mouth Daily.     • levothyroxine (SYNTHROID) 50 MCG tablet Take 1 tablet by mouth Daily. 90 tablet 3   • meclizine (ANTIVERT) 25 MG tablet Take 1 tablet by mouth 3 (Three) Times a Day As Needed for Dizziness. 30 tablet 0   • metFORMIN ER (GLUCOPHAGE-XR) 500 MG 24 hr tablet TAKE ONE TABLET BY MOUTH DAILY BEFORE LUNCH 90 tablet 0     No facility-administered medications prior to visit.        Patient Active Problem List   Diagnosis   • Panic attack   • Mixed hyperlipidemia   • Type 2  "diabetes mellitus without complication, without long-term current use of insulin (CMS/[facility])   • Mild episode of recurrent major depressive disorder (CMS/[facility])   • Menopause   • Medication monitoring encounter   • Medicare annual wellness visit, subsequent   • Vaccination not carried out   • Primary osteoarthritis of both knees   • Memory problem   • Subclinical hypothyroidism       Advanced Care Planning:  ACP discussion was held with the patient during this visit. Patient has an advance directive (not in EMR), copy requested.    Review of Systems    Compared to one year ago, the patient feels her physical health is worse.  Compared to one year ago, the patient feels her mental health is worse.    Reviewed chart for potential of high risk medication in the elderly: yes  Reviewed chart for potential of harmful drug interactions in the elderly:yes    Objective         Vitals:    11/03/20 1031   BP: 130/86   BP Location: Left arm   Patient Position: Sitting   Cuff Size: Adult   Pulse: 72   SpO2: 98%   Weight: 78.9 kg (174 lb)   Height: 172.7 cm (68\")       Body mass index is 26.46 kg/m².  Discussed the patient's BMI with her. The BMI is in the acceptable range.    Physical Exam  Vitals signs and nursing note reviewed.   Cardiovascular:      Rate and Rhythm: Normal rate.   Pulmonary:      Effort: Pulmonary effort is normal.   Neurological:      General: No focal deficit present.      Mental Status: She is alert. She is disoriented.   Psychiatric:         Cognition and Memory: Cognition is impaired. Memory is impaired. She exhibits impaired recent memory and impaired remote memory.         Judgment: Judgment is impulsive.         Lab Results   Component Value Date     (H) 10/27/2020    CHLPL 172 10/27/2020    TRIG 148 10/27/2020    HDL 53 10/27/2020    LDL 93 10/27/2020    VLDL 26 10/27/2020    HGBA1C 6.90 (H) 10/27/2020        Assessment/Plan   Medicare Risks and Personalized Health Plan  CMS Preventative Services " Quick Reference  Breast Cancer/Mammogram Screening  Cardiovascular risk  Chronic Pain   Dementia/Memory   Depression/Dysphoria  Diabetic Lab Screening   Fall Risk  Hearing Problem  Lung Cancer Risk  Osteoprorosis Risk    The above risks/problems have been discussed with the patient.  Pertinent information has been shared with the patient in the After Visit Summary.  Follow up plans and orders are seen below in the Assessment/Plan Section.    Diagnoses and all orders for this visit:    1. Medicare annual wellness visit, subsequent (Primary)    2. Vertigo  -     meclizine (ANTIVERT) 25 MG tablet; Take 1 tablet by mouth 3 (Three) Times a Day As Needed for Dizziness.  Dispense: 30 tablet; Refill: 0    3. Type 2 diabetes mellitus without complication, without long-term current use of insulin (CMS/[facility])  -     Discontinue: metFORMIN ER (GLUCOPHAGE-XR) 500 MG 24 hr tablet; Take 1 tablet by mouth Daily With Breakfast.  Dispense: 30 tablet; Refill: 0  -     metFORMIN ER (GLUCOPHAGE-XR) 500 MG 24 hr tablet; Take 1 tablet by mouth Daily With Breakfast.  Dispense: 90 tablet; Refill: 3    4. Encounter for screening mammogram for malignant neoplasm of breast  -     Mammo Screening Digital Tomosynthesis Bilateral With CAD; Future      Dementia is worse.    Enjoys eating her cookies.    Follow Up:  No follow-ups on file.     An After Visit Summary and PPPS were given to the patient.

## 2020-11-09 ENCOUNTER — OFFICE VISIT (OUTPATIENT)
Dept: ORTHOPEDIC SURGERY | Facility: CLINIC | Age: 85
End: 2020-11-09

## 2020-11-09 DIAGNOSIS — R52 PAIN: Primary | ICD-10-CM

## 2020-11-09 DIAGNOSIS — M17.11 PRIMARY OSTEOARTHRITIS OF RIGHT KNEE: ICD-10-CM

## 2020-11-09 DIAGNOSIS — M17.0 PRIMARY OSTEOARTHRITIS OF BOTH KNEES: ICD-10-CM

## 2020-11-09 PROCEDURE — 20610 DRAIN/INJ JOINT/BURSA W/O US: CPT | Performed by: ORTHOPAEDIC SURGERY

## 2020-11-09 PROCEDURE — 73562 X-RAY EXAM OF KNEE 3: CPT | Performed by: ORTHOPAEDIC SURGERY

## 2020-11-09 RX ORDER — BETAMETHASONE SODIUM PHOSPHATE AND BETAMETHASONE ACETATE 3; 3 MG/ML; MG/ML
6 INJECTION, SUSPENSION INTRA-ARTICULAR; INTRALESIONAL; INTRAMUSCULAR; SOFT TISSUE
Status: COMPLETED | OUTPATIENT
Start: 2020-11-09 | End: 2020-11-09

## 2020-11-09 RX ORDER — LIDOCAINE HYDROCHLORIDE 10 MG/ML
8 INJECTION, SOLUTION EPIDURAL; INFILTRATION; INTRACAUDAL; PERINEURAL
Status: COMPLETED | OUTPATIENT
Start: 2020-11-09 | End: 2020-11-09

## 2020-11-09 RX ADMIN — LIDOCAINE HYDROCHLORIDE 8 ML: 10 INJECTION, SOLUTION EPIDURAL; INFILTRATION; INTRACAUDAL; PERINEURAL at 15:36

## 2020-11-09 RX ADMIN — BETAMETHASONE SODIUM PHOSPHATE AND BETAMETHASONE ACETATE 6 MG: 3; 3 INJECTION, SUSPENSION INTRA-ARTICULAR; INTRALESIONAL; INTRAMUSCULAR; SOFT TISSUE at 15:36

## 2020-11-09 NOTE — PROGRESS NOTES
Subjective: Bilateral knee pain right worse than left     Patient ID: Magnolia Rios is a 86 y.o. female.    Chief Complaint:    History of Present Illness 86-year-old female known to me with not been seen for almost a year presents with increasing pain discomfort particularly in the right knee.  Had a cortisone injection over a year ago did well to just recently.  Patient seen today with her daughter.  Alexander with difficulty with ambulation primarily because of the right knee       Social History     Occupational History     Employer: RETIRED   Tobacco Use   • Smoking status: Former Smoker     Types: Cigarettes     Quit date: 1989     Years since quittin.1   • Smokeless tobacco: Never Used   Substance and Sexual Activity   • Alcohol use: Yes     Alcohol/week: 1.0 standard drinks     Types: 1 Cans of beer per week     Frequency: Monthly or less     Drinks per session: 1 or 2     Comment: occ   • Drug use: No   • Sexual activity: Yes     Partners: Male     Birth control/protection: Post-menopausal      Review of Systems   Constitutional: Negative for chills, diaphoresis and unexpected weight change.   HENT: Negative for hearing loss, nosebleeds, sore throat and tinnitus.    Eyes: Negative for pain and visual disturbance.   Respiratory: Negative for cough, shortness of breath and wheezing.    Cardiovascular: Negative for chest pain and palpitations.   Gastrointestinal: Negative for abdominal pain, diarrhea, nausea and vomiting.   Endocrine: Negative for cold intolerance, heat intolerance and polydipsia.   Genitourinary: Negative for difficulty urinating, dyspareunia and hematuria.   Musculoskeletal: Positive for arthralgias, gait problem and joint swelling.   Skin: Negative for rash and wound.   Allergic/Immunologic: Negative for environmental allergies.   Neurological: Negative for dizziness, syncope and numbness.   Hematological: Does not bruise/bleed easily.   Psychiatric/Behavioral: Negative for  dysphoric mood and sleep disturbance. The patient is not nervous/anxious.          Past Medical History:   Diagnosis Date   • Depression    • Heart palpitations    • Panic attack      Past Surgical History:   Procedure Laterality Date   • COLONOSCOPY     • HYSTERECTOMY       Family History   Problem Relation Age of Onset   • No Known Problems Mother    • Seizures Father    • Breast cancer Neg Hx          Objective:  There were no vitals filed for this visit.  There were no vitals filed for this visit.  There is no height or weight on file to calculate BMI.        Ortho Exam   AP lateral sunrise view of the right knee does show increase in the arthritic changes compared to previous x-rays.  She is developing a mild to moderate valgus deformity with bone-on-bone laterally.  Osteophytes noted in all 3 compartments compared to previous x-rays.  She is alert and oriented x3.  Again she probably has a 10 degree valgus deformity and there is pain and crepitus with range of motion to 0 to 120 degrees.  No instability at 90 degrees mild valgus deformity to stressing.  Quad and hamstring function are 4 out of 5 secondary to pain the calf is nontender.  Good capillary refill.  Left knee shows mild patellofemoral crepitus with range of motion but again there is no instability throughout the arc of motion.  Quad and hamstring function are 5/5 and the calf remains nontender.  Good distal pulses.    Assessment:        1. Pain    2. Primary osteoarthritis of both knees           Plan: Reviewed the x-ray findings physical findings and the history with the patient and her daughter.  The right knee is very symptomatic surrogate received a cortisone injection is is been over a year to see if we can get some improvement of her symptoms.  The knee therefore injected with 4 cc lidocaine 1 to see of [place].  Postinjection instructions given to the patient.  We will obtain authorization for gel injections and return in a month if either  knee symptomatic we will proceed with the gel injection.  Answered all questions  Large Joint Arthrocentesis: R knee  Date/Time: 11/9/2020 3:36 PM  Consent given by: patient  Site marked: site marked  Timeout: Immediately prior to procedure a time out was called to verify the correct patient, procedure, equipment, support staff and site/side marked as required   Supporting Documentation  Indications: pain   Procedure Details  Location: knee - R knee  Preparation: Patient was prepped and draped in the usual sterile fashion  Needle size: 22 G  Approach: superior (lateral)  Medications administered: 8 mL lidocaine PF 1% 1 %; 6 mg betamethasone acetate-betamethasone sodium phosphate 6 (3-3) MG/ML  Patient tolerance: patient tolerated the procedure well with no immediate complications                  Work Status:    KATHRYN query complete.    Orders:  Orders Placed This Encounter   Procedures   • Large Joint Arthrocentesis: R knee   • XR Knee 3+ View With [place] Right   • XR Knee 3+ View With [place] Left   • Visco Treatment       Medications:  No orders of the defined types were placed in this encounter.      Followup:  Return in about 4 weeks (around 12/7/2020).          Dictated utilizing Dragon dictation

## 2020-11-17 ENCOUNTER — APPOINTMENT (OUTPATIENT)
Dept: MAMMOGRAPHY | Facility: HOSPITAL | Age: 85
End: 2020-11-17

## 2020-12-05 DIAGNOSIS — F41.0 PANIC ATTACK: ICD-10-CM

## 2020-12-07 ENCOUNTER — CLINICAL SUPPORT (OUTPATIENT)
Dept: ORTHOPEDIC SURGERY | Facility: CLINIC | Age: 85
End: 2020-12-07

## 2020-12-07 VITALS — WEIGHT: 174 LBS | HEIGHT: 68 IN | BODY MASS INDEX: 26.37 KG/M2

## 2020-12-07 DIAGNOSIS — M17.0 PRIMARY OSTEOARTHRITIS OF BOTH KNEES: ICD-10-CM

## 2020-12-07 DIAGNOSIS — R52 PAIN: Primary | ICD-10-CM

## 2020-12-07 PROCEDURE — 20610 DRAIN/INJ JOINT/BURSA W/O US: CPT | Performed by: ORTHOPAEDIC SURGERY

## 2020-12-07 RX ORDER — FLUOXETINE HYDROCHLORIDE 20 MG/1
CAPSULE ORAL
Qty: 90 CAPSULE | Refills: 1 | Status: SHIPPED | OUTPATIENT
Start: 2020-12-07 | End: 2021-05-17 | Stop reason: SDUPTHER

## 2020-12-07 NOTE — PROGRESS NOTES
Subjective: Osteoarthritis both knees     Patient ID: Magnolia Rios is a 86 y.o. female.    Chief Complaint:    History of Present Illness 86-year-old female seen for Monovisc injection into both knees       Social History     Occupational History     Employer: RETIRED   Tobacco Use   • Smoking status: Former Smoker     Types: Cigarettes     Quit date: 1989     Years since quittin.2   • Smokeless tobacco: Never Used   Substance and Sexual Activity   • Alcohol use: Yes     Alcohol/week: 1.0 standard drinks     Types: 1 Cans of beer per week     Frequency: Monthly or less     Drinks per session: 1 or 2     Comment: occ   • Drug use: No   • Sexual activity: Yes     Partners: Male     Birth control/protection: Post-menopausal      Review of Systems   Constitutional: Negative for chills, diaphoresis, fever and unexpected weight change.   HENT: Negative for hearing loss, nosebleeds, sore throat and tinnitus.    Eyes: Negative for pain and visual disturbance.   Respiratory: Negative for cough, shortness of breath and wheezing.    Cardiovascular: Negative for chest pain and palpitations.   Gastrointestinal: Negative for abdominal pain, diarrhea, nausea and vomiting.   Endocrine: Negative for cold intolerance, heat intolerance and polydipsia.   Genitourinary: Negative for difficulty urinating, dysuria and hematuria.   Musculoskeletal: Positive for arthralgias and myalgias. Negative for joint swelling.   Skin: Negative for rash and wound.   Allergic/Immunologic: Negative for environmental allergies.   Neurological: Negative for dizziness, syncope and numbness.   Hematological: Does not bruise/bleed easily.   Psychiatric/Behavioral: Negative for dysphoric mood and sleep disturbance. The patient is not nervous/anxious.          Past Medical History:   Diagnosis Date   • Depression    • Heart palpitations    • Panic attack      Past Surgical History:   Procedure Laterality Date   • COLONOSCOPY     • HYSTERECTOMY        Family History   Problem Relation Age of Onset   • No Known Problems Mother    • Seizures Father    • Breast cancer Neg Hx          Objective:  There were no vitals filed for this visit.      12/07/20  1340   Weight: 78.9 kg (174 lb)     Body mass index is 26.46 kg/m².        Ortho Exam 6 mL injection given in each knee through the superolateral portal tolerating well.  Postinjection instructions given to the patient.         Assessment:        1. Pain    2. Primary osteoarthritis of both knees           Plan: Return to see me as needed  Large Joint Arthrocentesis  Date/Time: 12/7/2020 1:42 PM  Consent given by: patient  Site marked: site marked  Timeout: Immediately prior to procedure a time out was called to verify the correct patient, procedure, equipment, support staff and site/side marked as required   Supporting Documentation  Indications: pain   Procedure Details  Location: knee - Knee joint: BILATERAL KNEE.  Preparation: Patient was prepped and draped in the usual sterile fashion  Needle size: 18 G  Approach: superior (lateral)  Medications administered: 88 mg Hyaluronan 88 MG/4ML  Patient tolerance: patient tolerated the procedure well with no immediate complications      MONOVISC PROVIDED VIA [facility].           Work Status:    Kibin query complete.    Orders:  Orders Placed This Encounter   Procedures   • Large Joint Arthrocentesis       Medications:  No orders of the defined types were placed in this encounter.      Followup:  Return if symptoms worsen or fail to improve.          Dictated utilizing Dragon dictation

## 2020-12-17 ENCOUNTER — APPOINTMENT (OUTPATIENT)
Dept: MAMMOGRAPHY | Facility: HOSPITAL | Age: 85
End: 2020-12-17

## 2021-01-13 DIAGNOSIS — E78.2 MIXED HYPERLIPIDEMIA: ICD-10-CM

## 2021-01-13 RX ORDER — EZETIMIBE 10 MG/1
TABLET ORAL
Qty: 90 TABLET | Refills: 3 | Status: SHIPPED | OUTPATIENT
Start: 2021-01-13

## 2021-01-16 DIAGNOSIS — M17.0 PRIMARY OSTEOARTHRITIS OF BOTH KNEES: ICD-10-CM

## 2021-01-16 RX ORDER — MELOXICAM 15 MG/1
TABLET ORAL
Qty: 90 TABLET | Refills: 3 | Status: SHIPPED | OUTPATIENT
Start: 2021-01-16 | End: 2021-08-19 | Stop reason: HOSPADM

## 2021-01-19 DIAGNOSIS — R41.3 MEMORY PROBLEM: ICD-10-CM

## 2021-01-19 RX ORDER — MEMANTINE HYDROCHLORIDE 10 MG/1
10 TABLET ORAL EVERY MORNING
Qty: 90 TABLET | Refills: 1 | Status: SHIPPED | OUTPATIENT
Start: 2021-01-19 | End: 2021-05-17 | Stop reason: SDUPTHER

## 2021-01-19 NOTE — TELEPHONE ENCOUNTER
Caller: [facility] - [facility] [address] [facility] - [phone] [facility] [phone] FX    Relationship: Pharmacy    Best call back number: [phone]    Medication needed:   Requested Prescriptions     Pending Prescriptions Disp Refills   • memantine (NAMENDA) 10 MG tablet 90 tablet 0     Sig: Take 1 tablet by mouth Every Morning.       When do you need the refill by: ASAP    What details did the patient provide when requesting the medication: PHARMACY STATED THE PATIENT REQUESTED A 90-DAY SUPPLY. REFERENCE NUMBER [id number].     Does the patient have less than a 3 day supply:  UNKNOWN    What is the patient's preferred pharmacy: [facility] [place] [place] [address] [place] [address] - [phone] [facility] [phone] FX<br>[facility] - [place], [place] - [address] [place] [address] [phone] [facility] [phone] FX

## 2021-03-09 DIAGNOSIS — F41.0 PANIC ATTACK: ICD-10-CM

## 2021-03-10 RX ORDER — ALPRAZOLAM 0.5 MG/1
TABLET ORAL
Qty: 30 TABLET | Refills: 1 | Status: SHIPPED | OUTPATIENT
Start: 2021-03-10 | End: 2021-05-17 | Stop reason: SDUPTHER

## 2021-04-13 DIAGNOSIS — E78.2 MIXED HYPERLIPIDEMIA: ICD-10-CM

## 2021-04-13 RX ORDER — SIMVASTATIN 20 MG
TABLET ORAL
Qty: 90 TABLET | Refills: 3 | Status: SHIPPED | OUTPATIENT
Start: 2021-04-13 | End: 2021-08-19 | Stop reason: HOSPADM

## 2021-04-23 DIAGNOSIS — R41.3 MEMORY PROBLEM: ICD-10-CM

## 2021-04-23 DIAGNOSIS — R79.89 ABNORMAL TSH: ICD-10-CM

## 2021-04-23 DIAGNOSIS — E78.2 MIXED HYPERLIPIDEMIA: Primary | ICD-10-CM

## 2021-04-23 DIAGNOSIS — E03.8 SUBCLINICAL HYPOTHYROIDISM: ICD-10-CM

## 2021-04-23 DIAGNOSIS — E11.9 TYPE 2 DIABETES MELLITUS WITHOUT COMPLICATION, WITHOUT LONG-TERM CURRENT USE OF INSULIN (HCC): ICD-10-CM

## 2021-04-23 DIAGNOSIS — E78.2 MIXED HYPERLIPIDEMIA: ICD-10-CM

## 2021-05-11 LAB
BUN SERPL-MCNC: 25 MG/DL (ref 8–23)
BUN/CREAT SERPL: 17.9 (ref 7–25)
CALCIUM SERPL-MCNC: 9.7 MG/DL (ref 8.6–10.5)
CHLORIDE SERPL-SCNC: 107 MMOL/L (ref 98–107)
CHOLEST SERPL-MCNC: 148 MG/DL (ref 0–200)
CO2 SERPL-SCNC: 26.1 MMOL/L (ref 22–29)
CREAT SERPL-MCNC: 1.4 MG/DL (ref 0.57–1)
ERYTHROCYTE [DISTWIDTH] IN BLOOD BY AUTOMATED COUNT: 12.5 % (ref 12.3–15.4)
GLUCOSE SERPL-MCNC: 130 MG/DL (ref 65–99)
HBA1C MFR BLD: 6.9 % (ref 4.8–5.6)
HCT VFR BLD AUTO: 36.7 % (ref 34–46.6)
HDLC SERPL-MCNC: 50 MG/DL (ref 40–60)
HGB BLD-MCNC: 12.2 G/DL (ref 12–15.9)
LDLC SERPL CALC-MCNC: 78 MG/DL (ref 0–100)
MCH RBC QN AUTO: 29.3 PG (ref 26.6–33)
MCHC RBC AUTO-ENTMCNC: 33.2 G/DL (ref 31.5–35.7)
MCV RBC AUTO: 88.2 FL (ref 79–97)
PLATELET # BLD AUTO: 285 10*3/MM3 (ref 140–450)
POTASSIUM SERPL-SCNC: 4.3 MMOL/L (ref 3.5–5.2)
RBC # BLD AUTO: 4.16 10*6/MM3 (ref 3.77–5.28)
SODIUM SERPL-SCNC: 141 MMOL/L (ref 136–145)
TRIGL SERPL-MCNC: 112 MG/DL (ref 0–150)
TSH SERPL DL<=0.005 MIU/L-ACNC: 5.13 UIU/ML (ref 0.27–4.2)
UNABLE TO VOID: NORMAL
VLDLC SERPL CALC-MCNC: 20 MG/DL (ref 5–40)
WBC # BLD AUTO: 8.13 10*3/MM3 (ref 3.4–10.8)

## 2021-05-17 ENCOUNTER — OFFICE VISIT (OUTPATIENT)
Dept: FAMILY MEDICINE CLINIC | Facility: CLINIC | Age: 86
End: 2021-05-17

## 2021-05-17 VITALS
DIASTOLIC BLOOD PRESSURE: 80 MMHG | BODY MASS INDEX: 26.83 KG/M2 | SYSTOLIC BLOOD PRESSURE: 124 MMHG | OXYGEN SATURATION: 98 % | HEIGHT: 68 IN | WEIGHT: 177 LBS | TEMPERATURE: 97.3 F | HEART RATE: 73 BPM

## 2021-05-17 DIAGNOSIS — E03.8 SUBCLINICAL HYPOTHYROIDISM: ICD-10-CM

## 2021-05-17 DIAGNOSIS — R41.3 MEMORY PROBLEM: ICD-10-CM

## 2021-05-17 DIAGNOSIS — F41.0 PANIC ATTACK: ICD-10-CM

## 2021-05-17 DIAGNOSIS — E11.9 TYPE 2 DIABETES MELLITUS WITHOUT COMPLICATION, WITHOUT LONG-TERM CURRENT USE OF INSULIN (HCC): Primary | ICD-10-CM

## 2021-05-17 PROCEDURE — 99214 OFFICE O/P EST MOD 30 MIN: CPT | Performed by: FAMILY MEDICINE

## 2021-05-17 RX ORDER — MEMANTINE HYDROCHLORIDE 10 MG/1
10 TABLET ORAL EVERY MORNING
Qty: 90 TABLET | Refills: 1 | Status: SHIPPED | OUTPATIENT
Start: 2021-05-17

## 2021-05-17 RX ORDER — FLUOXETINE HYDROCHLORIDE 20 MG/1
20 CAPSULE ORAL DAILY
Qty: 90 CAPSULE | Refills: 1 | Status: SHIPPED | OUTPATIENT
Start: 2021-05-17

## 2021-05-17 RX ORDER — LEVOTHYROXINE SODIUM 0.03 MG/1
25 TABLET ORAL DAILY
Qty: 90 TABLET | Refills: 1 | Status: SHIPPED | OUTPATIENT
Start: 2021-05-17

## 2021-05-17 RX ORDER — ALPRAZOLAM 0.5 MG/1
0.5 TABLET ORAL NIGHTLY PRN
Qty: 30 TABLET | Refills: 5 | Status: SHIPPED | OUTPATIENT
Start: 2021-05-17 | End: 2021-08-19 | Stop reason: HOSPADM

## 2021-05-17 NOTE — PROGRESS NOTES
"  Chief Complaint   Patient presents with   • Diabetes   • Hypothyroidism   • Hyperlipidemia   • Dementia       Subjective   Magnolia Rios is an 86 y.o. female who presents for follow up of diabetes. Current symptoms include: none. Patient denies foot ulcerations. Evaluation to date has included: fasting blood sugar, fasting lipid panel and hemoglobin A1C. Home sugars: patient does not check sugars. Current treatments: Continued metformin which has been effective and Continued statin which has been effective. Discussed importance of yearly eye exams and checking feet for skin integrity.    Here with daughter  dementia is worse.  Very unsteady on feet. Using a Rollator now        The following portions of the patient's history were reviewed and updated as appropriate: allergies, current medications, past family history, past medical history, past social history, past surgical history and problem list.        Review of Systems  Neurological: positive for gait problems, memory problems and weakness     Vitals:    05/17/21 0906   BP: 124/80   BP Location: Left arm   Patient Position: Sitting   Cuff Size: Adult   Pulse: 73   Temp: 97.3 °F (36.3 °C)   TempSrc: Temporal   SpO2: 98%   Weight: 80.3 kg (177 lb)   Height: 172.7 cm (68\")       Objective    Gen:  Alert, pleasant  Ears: canals clear, TMs normal  Throat: clear , no thrush, teeth ok  Neck: no bruit, no LAD  Lungs: clear  Heart: RR no murmur  Feet:  No rash, no skin breakdown, sensation grossly normal.    Laboratory:  Results for orders placed or performed in visit on 04/23/21   Hemoglobin A1c    Specimen: Blood   Result Value Ref Range    Hemoglobin A1C 6.90 (H) 4.80 - 5.60 %   TSH    Specimen: Blood   Result Value Ref Range    TSH 5.130 (H) 0.270 - 4.200 uIU/mL   CBC (No Diff)    Specimen: Blood   Result Value Ref Range    WBC 8.13 3.40 - 10.80 10*3/mm3    RBC 4.16 3.77 - 5.28 10*6/mm3    Hemoglobin 12.2 12.0 - 15.9 g/dL    Hematocrit 36.7 34.0 - 46.6 %    MCV " 88.2 79.0 - 97.0 fL    MCH 29.3 26.6 - 33.0 pg    MCHC 33.2 31.5 - 35.7 g/dL    RDW 12.5 12.3 - 15.4 %    Platelets 285 140 - 450 10*3/mm3   Basic Metabolic Panel    Specimen: Blood   Result Value Ref Range    Glucose 130 (H) 65 - 99 mg/dL    BUN 25 (H) 8 - 23 mg/dL    Creatinine 1.40 (H) 0.57 - 1.00 mg/dL    eGFR Non African Am 36 (L) >60 mL/min/1.73    eGFR African Am 43 (L) >60 mL/min/1.73    BUN/Creatinine Ratio 17.9 7.0 - 25.0    Sodium 141 136 - 145 mmol/L    Potassium 4.3 3.5 - 5.2 mmol/L    Chloride 107 98 - 107 mmol/L    Total CO2 26.1 22.0 - 29.0 mmol/L    Calcium 9.7 8.6 - 10.5 mg/dL   Lipid Panel    Specimen: Blood   Result Value Ref Range    Total Cholesterol 148 0 - 200 mg/dL    Triglycerides 112 0 - 150 mg/dL    HDL Cholesterol 50 40 - 60 mg/dL    VLDL Cholesterol Ank[place] 20 5 - 40 mg/dL    LDL Chol Calc ([facility]) 78 0 - 100 mg/dL   Unable To Void   Result Value Ref Range    Unable to Void Comment         Assessment/Plan        Discussed general issues about diabetes pathophysiology and management.  Continued metformin; see  medication orders.  Continued statin drug see medication orders.  Follow up in 6 months or as needed.    Diagnoses and all orders for this visit:    1. Type 2 diabetes mellitus without complication, without long-term current use of insulin (CMS/[facility]) (Primary)  -     Basic Metabolic Panel; Future  -     Hemoglobin A1c; Future    2. Panic attack  -     FLUoxetine (PROzac) 20 MG capsule; Take 1 capsule by mouth Daily.  Dispense: 90 capsule; Refill: 1  -     ALPRAZolam (XANAX) 0.5 MG tablet; Take 1 tablet by mouth At Night As Needed for Anxiety.  Dispense: 30 tablet; Refill: 5    3. Memory problem  -     memantine (NAMENDA) 10 MG tablet; Take 1 tablet by mouth Every Morning.  Dispense: 90 tablet; Refill: 1    4. Subclinical hypothyroidism  -     levothyroxine (Synthroid) 25 MCG tablet; Take 1 tablet by mouth Daily. Indications: Underactive Thyroid  Dispense: 90 tablet; Refill: 1  -     TSH;  Future  -     T4, Free; Future  -     T3; Future      Restart synthroid.      Discussed healthy diabetic eating plan.  May refer to ADA web site, diabetes.org    Return in about 6 months (around 11/17/2021) for diabetes.  There are no Patient Instructions on file for this visit.  Medications Discontinued During This Encounter   Medication Reason   • FLUoxetine (PROzac) 20 MG capsule Reorder   • memantine (NAMENDA) 10 MG tablet Reorder   • ALPRAZolam (XANAX) 0.5 MG tablet Reorder         Dr. Nathen Bailey MD  [place], [place]  [facility].

## 2021-05-22 LAB
DRUGS UR: NORMAL
MICROALBUMIN UR-MCNC: 16.2 UG/ML

## 2021-08-18 ENCOUNTER — APPOINTMENT (OUTPATIENT)
Dept: CT IMAGING | Facility: HOSPITAL | Age: 86
End: 2021-08-18

## 2021-08-18 ENCOUNTER — HOSPITAL ENCOUNTER (OUTPATIENT)
Facility: HOSPITAL | Age: 86
Setting detail: OBSERVATION
Discharge: NURSING, REHAB OR LONG-TERM CARE | End: 2021-08-19
Attending: EMERGENCY MEDICINE | Admitting: INTERNAL MEDICINE

## 2021-08-18 ENCOUNTER — APPOINTMENT (OUTPATIENT)
Dept: GENERAL RADIOLOGY | Facility: HOSPITAL | Age: 86
End: 2021-08-18

## 2021-08-18 DIAGNOSIS — G45.9 TIA (TRANSIENT ISCHEMIC ATTACK): Primary | ICD-10-CM

## 2021-08-18 DIAGNOSIS — E11.9 TYPE 2 DIABETES MELLITUS WITHOUT COMPLICATION, WITHOUT LONG-TERM CURRENT USE OF INSULIN (HCC): ICD-10-CM

## 2021-08-18 LAB
ALBUMIN SERPL-MCNC: 3.8 G/DL (ref 3.5–5.2)
ALBUMIN/GLOB SERPL: 1.5 G/DL
ALP SERPL-CCNC: 102 U/L (ref 39–117)
ALT SERPL W P-5'-P-CCNC: 9 U/L (ref 1–33)
ANION GAP SERPL CALCULATED.3IONS-SCNC: 9 MMOL/L (ref 5–15)
APTT PPP: 28.6 SECONDS (ref 24.3–38.1)
AST SERPL-CCNC: 13 U/L (ref 1–32)
BASOPHILS # BLD AUTO: 0.05 10*3/MM3 (ref 0–0.2)
BASOPHILS NFR BLD AUTO: 0.5 % (ref 0–1.5)
BILIRUB SERPL-MCNC: 0.3 MG/DL (ref 0–1.2)
BUN SERPL-MCNC: 28 MG/DL (ref 8–23)
BUN/CREAT SERPL: 17.1 (ref 7–25)
CALCIUM SPEC-SCNC: 9.4 MG/DL (ref 8.6–10.5)
CHLORIDE SERPL-SCNC: 102 MMOL/L (ref 98–107)
CO2 SERPL-SCNC: 25 MMOL/L (ref 22–29)
CREAT SERPL-MCNC: 1.64 MG/DL (ref 0.57–1)
DEPRECATED RDW RBC AUTO: 42.5 FL (ref 37–54)
EOSINOPHIL # BLD AUTO: 0.11 10*3/MM3 (ref 0–0.4)
EOSINOPHIL NFR BLD AUTO: 1.1 % (ref 0.3–6.2)
ERYTHROCYTE [DISTWIDTH] IN BLOOD BY AUTOMATED COUNT: 13.1 % (ref 12.3–15.4)
ERYTHROCYTE [SEDIMENTATION RATE] IN BLOOD: 21 MM/HR (ref 0–30)
GFR SERPL CREATININE-BSD FRML MDRD: 30 ML/MIN/1.73
GLOBULIN UR ELPH-MCNC: 2.6 GM/DL
GLUCOSE BLDC GLUCOMTR-MCNC: 143 MG/DL (ref 70–130)
GLUCOSE BLDC GLUCOMTR-MCNC: 180 MG/DL (ref 70–130)
GLUCOSE SERPL-MCNC: 190 MG/DL (ref 65–99)
HCT VFR BLD AUTO: 37.1 % (ref 34–46.6)
HGB BLD-MCNC: 12 G/DL (ref 12–15.9)
HOLD SPECIMEN: NORMAL
IMM GRANULOCYTES # BLD AUTO: 0.03 10*3/MM3 (ref 0–0.05)
IMM GRANULOCYTES NFR BLD AUTO: 0.3 % (ref 0–0.5)
INR PPP: 1.06 (ref 0.9–1.1)
LYMPHOCYTES # BLD AUTO: 2.53 10*3/MM3 (ref 0.7–3.1)
LYMPHOCYTES NFR BLD AUTO: 25.9 % (ref 19.6–45.3)
MAGNESIUM SERPL-MCNC: 2.2 MG/DL (ref 1.6–2.4)
MCH RBC QN AUTO: 28.9 PG (ref 26.6–33)
MCHC RBC AUTO-ENTMCNC: 32.3 G/DL (ref 31.5–35.7)
MCV RBC AUTO: 89.4 FL (ref 79–97)
MONOCYTES # BLD AUTO: 0.96 10*3/MM3 (ref 0.1–0.9)
MONOCYTES NFR BLD AUTO: 9.8 % (ref 5–12)
NEUTROPHILS NFR BLD AUTO: 6.09 10*3/MM3 (ref 1.7–7)
NEUTROPHILS NFR BLD AUTO: 62.4 % (ref 42.7–76)
NRBC BLD AUTO-RTO: 0 /100 WBC (ref 0–0.2)
PHOSPHATE SERPL-MCNC: 3 MG/DL (ref 2.5–4.5)
PLATELET # BLD AUTO: 248 10*3/MM3 (ref 140–450)
PMV BLD AUTO: 10.7 FL (ref 6–12)
POTASSIUM SERPL-SCNC: 4.3 MMOL/L (ref 3.5–5.2)
PROT SERPL-MCNC: 6.4 G/DL (ref 6–8.5)
PROTHROMBIN TIME: 13.8 SECONDS (ref 12.1–15)
RBC # BLD AUTO: 4.15 10*6/MM3 (ref 3.77–5.28)
SARS-COV-2 RNA PNL SPEC NAA+PROBE: NOT DETECTED
SODIUM SERPL-SCNC: 136 MMOL/L (ref 136–145)
TROPONIN T SERPL-MCNC: <0.01 NG/ML (ref 0–0.03)
WBC # BLD AUTO: 9.77 10*3/MM3 (ref 3.4–10.8)
WHOLE BLOOD HOLD SPECIMEN: NORMAL

## 2021-08-18 PROCEDURE — 85730 THROMBOPLASTIN TIME PARTIAL: CPT | Performed by: EMERGENCY MEDICINE

## 2021-08-18 PROCEDURE — 99219 PR INITIAL OBSERVATION CARE/DAY 50 MINUTES: CPT | Performed by: STUDENT IN AN ORGANIZED HEALTH CARE EDUCATION/TRAINING PROGRAM

## 2021-08-18 PROCEDURE — 70450 CT HEAD/BRAIN W/O DYE: CPT

## 2021-08-18 PROCEDURE — 93005 ELECTROCARDIOGRAM TRACING: CPT | Performed by: EMERGENCY MEDICINE

## 2021-08-18 PROCEDURE — G0378 HOSPITAL OBSERVATION PER HR: HCPCS

## 2021-08-18 PROCEDURE — 84484 ASSAY OF TROPONIN QUANT: CPT | Performed by: EMERGENCY MEDICINE

## 2021-08-18 PROCEDURE — 82962 GLUCOSE BLOOD TEST: CPT

## 2021-08-18 PROCEDURE — C9803 HOPD COVID-19 SPEC COLLECT: HCPCS

## 2021-08-18 PROCEDURE — 87635 SARS-COV-2 COVID-19 AMP PRB: CPT | Performed by: EMERGENCY MEDICINE

## 2021-08-18 PROCEDURE — 83735 ASSAY OF MAGNESIUM: CPT | Performed by: STUDENT IN AN ORGANIZED HEALTH CARE EDUCATION/TRAINING PROGRAM

## 2021-08-18 PROCEDURE — 84100 ASSAY OF PHOSPHORUS: CPT | Performed by: STUDENT IN AN ORGANIZED HEALTH CARE EDUCATION/TRAINING PROGRAM

## 2021-08-18 PROCEDURE — 71045 X-RAY EXAM CHEST 1 VIEW: CPT

## 2021-08-18 PROCEDURE — 93010 ELECTROCARDIOGRAM REPORT: CPT | Performed by: INTERNAL MEDICINE

## 2021-08-18 PROCEDURE — 99285 EMERGENCY DEPT VISIT HI MDM: CPT | Performed by: EMERGENCY MEDICINE

## 2021-08-18 PROCEDURE — 99284 EMERGENCY DEPT VISIT MOD MDM: CPT

## 2021-08-18 PROCEDURE — 85025 COMPLETE CBC W/AUTO DIFF WBC: CPT | Performed by: EMERGENCY MEDICINE

## 2021-08-18 PROCEDURE — 80053 COMPREHEN METABOLIC PANEL: CPT | Performed by: EMERGENCY MEDICINE

## 2021-08-18 PROCEDURE — 85652 RBC SED RATE AUTOMATED: CPT | Performed by: STUDENT IN AN ORGANIZED HEALTH CARE EDUCATION/TRAINING PROGRAM

## 2021-08-18 PROCEDURE — 85610 PROTHROMBIN TIME: CPT | Performed by: EMERGENCY MEDICINE

## 2021-08-18 RX ORDER — ATORVASTATIN CALCIUM 40 MG/1
80 TABLET, FILM COATED ORAL NIGHTLY
Status: DISCONTINUED | OUTPATIENT
Start: 2021-08-19 | End: 2021-08-19 | Stop reason: HOSPADM

## 2021-08-18 RX ORDER — SODIUM CHLORIDE 0.9 % (FLUSH) 0.9 %
10 SYRINGE (ML) INJECTION EVERY 12 HOURS SCHEDULED
Status: DISCONTINUED | OUTPATIENT
Start: 2021-08-19 | End: 2021-08-19 | Stop reason: HOSPADM

## 2021-08-18 RX ORDER — FLUOXETINE HYDROCHLORIDE 20 MG/1
20 CAPSULE ORAL DAILY
Status: DISCONTINUED | OUTPATIENT
Start: 2021-08-19 | End: 2021-08-19 | Stop reason: HOSPADM

## 2021-08-18 RX ORDER — SODIUM CHLORIDE 0.9 % (FLUSH) 0.9 %
10 SYRINGE (ML) INJECTION AS NEEDED
Status: DISCONTINUED | OUTPATIENT
Start: 2021-08-18 | End: 2021-08-19 | Stop reason: HOSPADM

## 2021-08-18 RX ORDER — MEMANTINE HYDROCHLORIDE 5 MG/1
10 TABLET ORAL DAILY
Status: DISCONTINUED | OUTPATIENT
Start: 2021-08-19 | End: 2021-08-19 | Stop reason: HOSPADM

## 2021-08-18 RX ORDER — HEPARIN SODIUM 5000 [USP'U]/ML
5000 INJECTION, SOLUTION INTRAVENOUS; SUBCUTANEOUS EVERY 8 HOURS SCHEDULED
Status: DISCONTINUED | OUTPATIENT
Start: 2021-08-19 | End: 2021-08-19 | Stop reason: HOSPADM

## 2021-08-18 RX ORDER — LABETALOL HYDROCHLORIDE 5 MG/ML
10 INJECTION, SOLUTION INTRAVENOUS EVERY 4 HOURS PRN
Status: DISCONTINUED | OUTPATIENT
Start: 2021-08-18 | End: 2021-08-19

## 2021-08-18 RX ORDER — MELATONIN
2000 DAILY
Refills: 1 | Status: DISCONTINUED | OUTPATIENT
Start: 2021-08-19 | End: 2021-08-19 | Stop reason: HOSPADM

## 2021-08-18 RX ORDER — MECLIZINE HYDROCHLORIDE 25 MG/1
25 TABLET ORAL 3 TIMES DAILY PRN
Status: DISCONTINUED | OUTPATIENT
Start: 2021-08-18 | End: 2021-08-19 | Stop reason: HOSPADM

## 2021-08-18 RX ORDER — SODIUM CHLORIDE 9 MG/ML
40 INJECTION, SOLUTION INTRAVENOUS AS NEEDED
Status: DISCONTINUED | OUTPATIENT
Start: 2021-08-18 | End: 2021-08-19 | Stop reason: HOSPADM

## 2021-08-18 RX ORDER — ASPIRIN 300 MG/1
300 SUPPOSITORY RECTAL DAILY
Status: DISCONTINUED | OUTPATIENT
Start: 2021-08-19 | End: 2021-08-19 | Stop reason: HOSPADM

## 2021-08-18 RX ORDER — MULTIPLE VITAMINS W/ MINERALS TAB 9MG-400MCG
1 TAB ORAL DAILY
Status: DISCONTINUED | OUTPATIENT
Start: 2021-08-19 | End: 2021-08-19 | Stop reason: HOSPADM

## 2021-08-18 RX ORDER — CLOPIDOGREL BISULFATE 75 MG/1
75 TABLET ORAL DAILY
Status: DISCONTINUED | OUTPATIENT
Start: 2021-08-19 | End: 2021-08-19 | Stop reason: HOSPADM

## 2021-08-18 RX ORDER — LEVOTHYROXINE SODIUM 0.03 MG/1
25 TABLET ORAL
Status: DISCONTINUED | OUTPATIENT
Start: 2021-08-19 | End: 2021-08-19 | Stop reason: HOSPADM

## 2021-08-18 RX ORDER — ASPIRIN 81 MG/1
81 TABLET, CHEWABLE ORAL DAILY
Status: DISCONTINUED | OUTPATIENT
Start: 2021-08-19 | End: 2021-08-19 | Stop reason: HOSPADM

## 2021-08-18 RX ORDER — ASPIRIN 81 MG/1
324 TABLET, CHEWABLE ORAL ONCE
Status: COMPLETED | OUTPATIENT
Start: 2021-08-18 | End: 2021-08-18

## 2021-08-18 RX ADMIN — ASPIRIN 81 MG CHEWABLE TABLET 324 MG: 81 TABLET CHEWABLE at 20:40

## 2021-08-19 ENCOUNTER — APPOINTMENT (OUTPATIENT)
Dept: CT IMAGING | Facility: HOSPITAL | Age: 86
End: 2021-08-19

## 2021-08-19 ENCOUNTER — APPOINTMENT (OUTPATIENT)
Dept: CARDIOLOGY | Facility: HOSPITAL | Age: 86
End: 2021-08-19

## 2021-08-19 ENCOUNTER — APPOINTMENT (OUTPATIENT)
Dept: MRI IMAGING | Facility: HOSPITAL | Age: 86
End: 2021-08-19

## 2021-08-19 VITALS
TEMPERATURE: 98.6 F | SYSTOLIC BLOOD PRESSURE: 133 MMHG | HEIGHT: 68 IN | BODY MASS INDEX: 27.06 KG/M2 | OXYGEN SATURATION: 94 % | DIASTOLIC BLOOD PRESSURE: 67 MMHG | HEART RATE: 75 BPM | WEIGHT: 178.57 LBS | RESPIRATION RATE: 17 BRPM

## 2021-08-19 LAB
ANION GAP SERPL CALCULATED.3IONS-SCNC: 8.5 MMOL/L (ref 5–15)
BACTERIA UR QL AUTO: ABNORMAL /HPF
BACTERIA UR QL AUTO: ABNORMAL /HPF
BH CV ECHO MEAS - ACS: 2 CM
BH CV ECHO MEAS - AO MAX PG (FULL): 3.5 MMHG
BH CV ECHO MEAS - AO MAX PG: 8.3 MMHG
BH CV ECHO MEAS - AO MEAN PG (FULL): 2 MMHG
BH CV ECHO MEAS - AO MEAN PG: 4 MMHG
BH CV ECHO MEAS - AO ROOT AREA (BSA CORRECTED): 1.8
BH CV ECHO MEAS - AO ROOT AREA: 9.1 CM^2
BH CV ECHO MEAS - AO ROOT DIAM: 3.4 CM
BH CV ECHO MEAS - AO V2 MAX: 144 CM/SEC
BH CV ECHO MEAS - AO V2 MEAN: 92.8 CM/SEC
BH CV ECHO MEAS - AO V2 VTI: 32.1 CM
BH CV ECHO MEAS - ASC AORTA: 3.8 CM
BH CV ECHO MEAS - AVA(I,A): 2.5 CM^2
BH CV ECHO MEAS - AVA(I,D): 2.5 CM^2
BH CV ECHO MEAS - AVA(V,A): 2.4 CM^2
BH CV ECHO MEAS - AVA(V,D): 2.4 CM^2
BH CV ECHO MEAS - BSA(HAYCOCK): 2 M^2
BH CV ECHO MEAS - BSA: 1.9 M^2
BH CV ECHO MEAS - BZI_BMI: 27.4 KILOGRAMS/M^2
BH CV ECHO MEAS - BZI_METRIC_HEIGHT: 172 CM
BH CV ECHO MEAS - BZI_METRIC_WEIGHT: 81 KG
BH CV ECHO MEAS - EDV(CUBED): 75.7 ML
BH CV ECHO MEAS - EDV(MOD-SP2): 66.6 ML
BH CV ECHO MEAS - EDV(MOD-SP4): 68.1 ML
BH CV ECHO MEAS - EDV(TEICH): 79.9 ML
BH CV ECHO MEAS - EF(CUBED): 74.6 %
BH CV ECHO MEAS - EF(MOD-BP): 59.5 %
BH CV ECHO MEAS - EF(MOD-SP2): 57.1 %
BH CV ECHO MEAS - EF(MOD-SP4): 64.9 %
BH CV ECHO MEAS - EF(TEICH): 66.8 %
BH CV ECHO MEAS - ESV(CUBED): 19.2 ML
BH CV ECHO MEAS - ESV(MOD-SP2): 28.6 ML
BH CV ECHO MEAS - ESV(MOD-SP4): 23.9 ML
BH CV ECHO MEAS - ESV(TEICH): 26.5 ML
BH CV ECHO MEAS - FS: 36.6 %
BH CV ECHO MEAS - IVS/LVPW: 1.5
BH CV ECHO MEAS - IVSD: 1.7 CM
BH CV ECHO MEAS - LAT PEAK E' VEL: 12.4 CM/SEC
BH CV ECHO MEAS - LV DIASTOLIC VOL/BSA (35-75): 35.1 ML/M^2
BH CV ECHO MEAS - LV MASS(C)D: 231 GRAMS
BH CV ECHO MEAS - LV MASS(C)DI: 119 GRAMS/M^2
BH CV ECHO MEAS - LV MAX PG: 4.8 MMHG
BH CV ECHO MEAS - LV MEAN PG: 2 MMHG
BH CV ECHO MEAS - LV SYSTOLIC VOL/BSA (12-30): 12.3 ML/M^2
BH CV ECHO MEAS - LV V1 MAX: 109 CM/SEC
BH CV ECHO MEAS - LV V1 MEAN: 72.2 CM/SEC
BH CV ECHO MEAS - LV V1 VTI: 25.3 CM
BH CV ECHO MEAS - LVIDD: 4.2 CM
BH CV ECHO MEAS - LVIDS: 2.7 CM
BH CV ECHO MEAS - LVLD AP2: 7.8 CM
BH CV ECHO MEAS - LVLD AP4: 7.7 CM
BH CV ECHO MEAS - LVLS AP2: 6.3 CM
BH CV ECHO MEAS - LVLS AP4: 5.7 CM
BH CV ECHO MEAS - LVOT AREA (M): 3.1 CM^2
BH CV ECHO MEAS - LVOT AREA: 3.1 CM^2
BH CV ECHO MEAS - LVOT DIAM: 2 CM
BH CV ECHO MEAS - LVPWD: 1.1 CM
BH CV ECHO MEAS - MED PEAK E' VEL: 6.1 CM/SEC
BH CV ECHO MEAS - MV A MAX VEL: 84.4 CM/SEC
BH CV ECHO MEAS - MV DEC SLOPE: 376 CM/SEC^2
BH CV ECHO MEAS - MV DEC TIME: 248 SEC
BH CV ECHO MEAS - MV E MAX VEL: 62.1 CM/SEC
BH CV ECHO MEAS - MV E/A: 0.74
BH CV ECHO MEAS - MV MEAN PG: 2 MMHG
BH CV ECHO MEAS - MV P1/2T MAX VEL: 94.1 CM/SEC
BH CV ECHO MEAS - MV P1/2T: 73.3 MSEC
BH CV ECHO MEAS - MV V2 MEAN: 60.3 CM/SEC
BH CV ECHO MEAS - MV V2 VTI: 25.5 CM
BH CV ECHO MEAS - MVA P1/2T LCG: 2.3 CM^2
BH CV ECHO MEAS - MVA(P1/2T): 3 CM^2
BH CV ECHO MEAS - MVA(VTI): 3.1 CM^2
BH CV ECHO MEAS - PA ACC TIME: 0.2 SEC
BH CV ECHO MEAS - PA MAX PG: 2.5 MMHG
BH CV ECHO MEAS - PA PR(ACCEL): -8.8 MMHG
BH CV ECHO MEAS - PA V2 MAX: 79.6 CM/SEC
BH CV ECHO MEAS - PULM DIAS VEL: 52.7 CM/SEC
BH CV ECHO MEAS - PULM S/D: 1.2
BH CV ECHO MEAS - PULM SYS VEL: 65.6 CM/SEC
BH CV ECHO MEAS - QP/QS: 0.8
BH CV ECHO MEAS - RAP SYSTOLE: 8 MMHG
BH CV ECHO MEAS - RV MEAN PG: 1 MMHG
BH CV ECHO MEAS - RV V1 MEAN: 40.8 CM/SEC
BH CV ECHO MEAS - RV V1 VTI: 15.3 CM
BH CV ECHO MEAS - RVOT AREA: 4.2 CM^2
BH CV ECHO MEAS - RVOT DIAM: 2.3 CM
BH CV ECHO MEAS - RVSP: 33 MMHG
BH CV ECHO MEAS - SI(AO): 150.1 ML/M^2
BH CV ECHO MEAS - SI(CUBED): 29.1 ML/M^2
BH CV ECHO MEAS - SI(LVOT): 40.9 ML/M^2
BH CV ECHO MEAS - SI(MOD-SP2): 19.6 ML/M^2
BH CV ECHO MEAS - SI(MOD-SP4): 22.8 ML/M^2
BH CV ECHO MEAS - SI(TEICH): 27.5 ML/M^2
BH CV ECHO MEAS - SV(AO): 291.4 ML
BH CV ECHO MEAS - SV(CUBED): 56.4 ML
BH CV ECHO MEAS - SV(LVOT): 79.5 ML
BH CV ECHO MEAS - SV(MOD-SP2): 38 ML
BH CV ECHO MEAS - SV(MOD-SP4): 44.2 ML
BH CV ECHO MEAS - SV(RVOT): 63.6 ML
BH CV ECHO MEAS - SV(TEICH): 53.4 ML
BH CV ECHO MEAS - TAPSE (>1.6): 2.7 CM
BH CV ECHO MEAS - TR MAX PG: 25 MMHG
BH CV ECHO MEAS - TR MAX VEL: 255.5 CM/SEC
BH CV ECHO MEASUREMENTS AVERAGE E/E' RATIO: 6.71
BH CV XLRA - RV BASE: 3.6 CM
BH CV XLRA - RV LENGTH: 5.6 CM
BH CV XLRA - RV MID: 2.3 CM
BH CV XLRA - TDI S': 10.1 CM/SEC
BILIRUB UR QL STRIP: NEGATIVE
BILIRUB UR QL STRIP: NEGATIVE
BUN SERPL-MCNC: 23 MG/DL (ref 8–23)
BUN/CREAT SERPL: 16.2 (ref 7–25)
CALCIUM SPEC-SCNC: 9.5 MG/DL (ref 8.6–10.5)
CHLORIDE SERPL-SCNC: 106 MMOL/L (ref 98–107)
CHOLEST SERPL-MCNC: 145 MG/DL (ref 0–200)
CLARITY UR: ABNORMAL
CLARITY UR: CLEAR
CO2 SERPL-SCNC: 25.5 MMOL/L (ref 22–29)
COLOR UR: ABNORMAL
COLOR UR: YELLOW
CREAT SERPL-MCNC: 1.42 MG/DL (ref 0.57–1)
CRP SERPL-MCNC: 0.75 MG/DL (ref 0–0.5)
DEPRECATED RDW RBC AUTO: 42.5 FL (ref 37–54)
ERYTHROCYTE [DISTWIDTH] IN BLOOD BY AUTOMATED COUNT: 12.9 % (ref 12.3–15.4)
FOLATE SERPL-MCNC: 8.53 NG/ML (ref 4.78–24.2)
GFR SERPL CREATININE-BSD FRML MDRD: 35 ML/MIN/1.73
GLUCOSE BLDC GLUCOMTR-MCNC: 128 MG/DL (ref 70–130)
GLUCOSE BLDC GLUCOMTR-MCNC: 129 MG/DL (ref 70–130)
GLUCOSE BLDC GLUCOMTR-MCNC: 193 MG/DL (ref 70–130)
GLUCOSE BLDC GLUCOMTR-MCNC: 232 MG/DL (ref 70–130)
GLUCOSE SERPL-MCNC: 130 MG/DL (ref 65–99)
GLUCOSE UR STRIP-MCNC: NEGATIVE MG/DL
GLUCOSE UR STRIP-MCNC: NEGATIVE MG/DL
HBA1C MFR BLD: 7.1 % (ref 4.8–5.6)
HCT VFR BLD AUTO: 35.7 % (ref 34–46.6)
HDLC SERPL-MCNC: 48 MG/DL (ref 40–60)
HGB BLD-MCNC: 11.3 G/DL (ref 12–15.9)
HGB UR QL STRIP.AUTO: ABNORMAL
HGB UR QL STRIP.AUTO: ABNORMAL
HYALINE CASTS UR QL AUTO: ABNORMAL /LPF
HYALINE CASTS UR QL AUTO: ABNORMAL /LPF
KETONES UR QL STRIP: NEGATIVE
KETONES UR QL STRIP: NEGATIVE
LDLC SERPL CALC-MCNC: 74 MG/DL (ref 0–100)
LDLC/HDLC SERPL: 1.49 {RATIO}
LEFT ATRIUM VOLUME INDEX: 24.9 ML/M2
LEUKOCYTE ESTERASE UR QL STRIP.AUTO: ABNORMAL
LEUKOCYTE ESTERASE UR QL STRIP.AUTO: ABNORMAL
LV EF 2D ECHO EST: 60 %
MAXIMAL PREDICTED HEART RATE: 134 BPM
MCH RBC QN AUTO: 28.3 PG (ref 26.6–33)
MCHC RBC AUTO-ENTMCNC: 31.7 G/DL (ref 31.5–35.7)
MCV RBC AUTO: 89.5 FL (ref 79–97)
NITRITE UR QL STRIP: NEGATIVE
NITRITE UR QL STRIP: POSITIVE
PH UR STRIP.AUTO: 6.5 [PH] (ref 4.5–8)
PH UR STRIP.AUTO: 7 [PH] (ref 4.5–8)
PLATELET # BLD AUTO: 246 10*3/MM3 (ref 140–450)
PMV BLD AUTO: 10.9 FL (ref 6–12)
POTASSIUM SERPL-SCNC: 3.8 MMOL/L (ref 3.5–5.2)
PROT UR QL STRIP: NEGATIVE
PROT UR QL STRIP: NEGATIVE
QT INTERVAL: 414 MS
RBC # BLD AUTO: 3.99 10*6/MM3 (ref 3.77–5.28)
RBC # UR: ABNORMAL /HPF
RBC # UR: ABNORMAL /HPF
REF LAB TEST METHOD: ABNORMAL
REF LAB TEST METHOD: ABNORMAL
SODIUM SERPL-SCNC: 140 MMOL/L (ref 136–145)
SP GR UR STRIP: 1.01 (ref 1–1.03)
SP GR UR STRIP: <=1.005 (ref 1–1.03)
SQUAMOUS #/AREA URNS HPF: ABNORMAL /HPF
SQUAMOUS #/AREA URNS HPF: ABNORMAL /HPF
STRESS TARGET HR: 114 BPM
TRIGL SERPL-MCNC: 127 MG/DL (ref 0–150)
TSH SERPL DL<=0.05 MIU/L-ACNC: 3.9 UIU/ML (ref 0.27–4.2)
UROBILINOGEN UR QL STRIP: ABNORMAL
UROBILINOGEN UR QL STRIP: ABNORMAL
VIT B12 BLD-MCNC: 517 PG/ML (ref 211–946)
VLDLC SERPL-MCNC: 23 MG/DL (ref 5–40)
WBC # BLD AUTO: 9.55 10*3/MM3 (ref 3.4–10.8)
WBC UR QL AUTO: ABNORMAL /HPF
WBC UR QL AUTO: ABNORMAL /HPF

## 2021-08-19 PROCEDURE — 87086 URINE CULTURE/COLONY COUNT: CPT | Performed by: NURSE PRACTITIONER

## 2021-08-19 PROCEDURE — 93306 TTE W/DOPPLER COMPLETE: CPT | Performed by: INTERNAL MEDICINE

## 2021-08-19 PROCEDURE — 84443 ASSAY THYROID STIM HORMONE: CPT | Performed by: STUDENT IN AN ORGANIZED HEALTH CARE EDUCATION/TRAINING PROGRAM

## 2021-08-19 PROCEDURE — 80048 BASIC METABOLIC PNL TOTAL CA: CPT | Performed by: STUDENT IN AN ORGANIZED HEALTH CARE EDUCATION/TRAINING PROGRAM

## 2021-08-19 PROCEDURE — 87077 CULTURE AEROBIC IDENTIFY: CPT | Performed by: NURSE PRACTITIONER

## 2021-08-19 PROCEDURE — 81001 URINALYSIS AUTO W/SCOPE: CPT | Performed by: STUDENT IN AN ORGANIZED HEALTH CARE EDUCATION/TRAINING PROGRAM

## 2021-08-19 PROCEDURE — G0378 HOSPITAL OBSERVATION PER HR: HCPCS

## 2021-08-19 PROCEDURE — 81001 URINALYSIS AUTO W/SCOPE: CPT | Performed by: NURSE PRACTITIONER

## 2021-08-19 PROCEDURE — 97161 PT EVAL LOW COMPLEX 20 MIN: CPT

## 2021-08-19 PROCEDURE — 82607 VITAMIN B-12: CPT | Performed by: STUDENT IN AN ORGANIZED HEALTH CARE EDUCATION/TRAINING PROGRAM

## 2021-08-19 PROCEDURE — 80061 LIPID PANEL: CPT | Performed by: STUDENT IN AN ORGANIZED HEALTH CARE EDUCATION/TRAINING PROGRAM

## 2021-08-19 PROCEDURE — 94799 UNLISTED PULMONARY SVC/PX: CPT

## 2021-08-19 PROCEDURE — 0 IOPAMIDOL PER 1 ML: Performed by: STUDENT IN AN ORGANIZED HEALTH CARE EDUCATION/TRAINING PROGRAM

## 2021-08-19 PROCEDURE — 96372 THER/PROPH/DIAG INJ SC/IM: CPT

## 2021-08-19 PROCEDURE — 70551 MRI BRAIN STEM W/O DYE: CPT

## 2021-08-19 PROCEDURE — 83036 HEMOGLOBIN GLYCOSYLATED A1C: CPT | Performed by: STUDENT IN AN ORGANIZED HEALTH CARE EDUCATION/TRAINING PROGRAM

## 2021-08-19 PROCEDURE — 99204 OFFICE O/P NEW MOD 45 MIN: CPT | Performed by: PSYCHIATRY & NEUROLOGY

## 2021-08-19 PROCEDURE — 70498 CT ANGIOGRAPHY NECK: CPT

## 2021-08-19 PROCEDURE — 70496 CT ANGIOGRAPHY HEAD: CPT

## 2021-08-19 PROCEDURE — 96374 THER/PROPH/DIAG INJ IV PUSH: CPT

## 2021-08-19 PROCEDURE — 85027 COMPLETE CBC AUTOMATED: CPT | Performed by: STUDENT IN AN ORGANIZED HEALTH CARE EDUCATION/TRAINING PROGRAM

## 2021-08-19 PROCEDURE — 99217 PR OBSERVATION CARE DISCHARGE MANAGEMENT: CPT | Performed by: NURSE PRACTITIONER

## 2021-08-19 PROCEDURE — 25010000002 HEPARIN (PORCINE) PER 1000 UNITS: Performed by: STUDENT IN AN ORGANIZED HEALTH CARE EDUCATION/TRAINING PROGRAM

## 2021-08-19 PROCEDURE — 87186 SC STD MICRODIL/AGAR DIL: CPT | Performed by: NURSE PRACTITIONER

## 2021-08-19 PROCEDURE — 82962 GLUCOSE BLOOD TEST: CPT

## 2021-08-19 PROCEDURE — 93306 TTE W/DOPPLER COMPLETE: CPT

## 2021-08-19 PROCEDURE — 82746 ASSAY OF FOLIC ACID SERUM: CPT | Performed by: STUDENT IN AN ORGANIZED HEALTH CARE EDUCATION/TRAINING PROGRAM

## 2021-08-19 PROCEDURE — 63710000001 INSULIN ASPART PER 5 UNITS: Performed by: STUDENT IN AN ORGANIZED HEALTH CARE EDUCATION/TRAINING PROGRAM

## 2021-08-19 PROCEDURE — 86140 C-REACTIVE PROTEIN: CPT | Performed by: STUDENT IN AN ORGANIZED HEALTH CARE EDUCATION/TRAINING PROGRAM

## 2021-08-19 PROCEDURE — 97165 OT EVAL LOW COMPLEX 30 MIN: CPT

## 2021-08-19 PROCEDURE — 96376 TX/PRO/DX INJ SAME DRUG ADON: CPT

## 2021-08-19 RX ORDER — CLOPIDOGREL BISULFATE 75 MG/1
75 TABLET ORAL DAILY
Qty: 30 TABLET
Start: 2021-08-20

## 2021-08-19 RX ORDER — LISINOPRIL 10 MG/1
10 TABLET ORAL
Status: DISCONTINUED | OUTPATIENT
Start: 2021-08-19 | End: 2021-08-19 | Stop reason: HOSPADM

## 2021-08-19 RX ORDER — AMLODIPINE BESYLATE 5 MG/1
5 TABLET ORAL
Start: 2021-08-20

## 2021-08-19 RX ORDER — AMLODIPINE BESYLATE 5 MG/1
5 TABLET ORAL
Status: DISCONTINUED | OUTPATIENT
Start: 2021-08-19 | End: 2021-08-19 | Stop reason: HOSPADM

## 2021-08-19 RX ORDER — LABETALOL HYDROCHLORIDE 5 MG/ML
20 INJECTION, SOLUTION INTRAVENOUS EVERY 4 HOURS PRN
Status: DISCONTINUED | OUTPATIENT
Start: 2021-08-19 | End: 2021-08-19 | Stop reason: HOSPADM

## 2021-08-19 RX ORDER — ASPIRIN 81 MG/1
81 TABLET ORAL DAILY
Start: 2021-08-19

## 2021-08-19 RX ORDER — DEXTROSE MONOHYDRATE 25 G/50ML
25 INJECTION, SOLUTION INTRAVENOUS
Status: DISCONTINUED | OUTPATIENT
Start: 2021-08-19 | End: 2021-08-19 | Stop reason: HOSPADM

## 2021-08-19 RX ORDER — NICOTINE POLACRILEX 4 MG
15 LOZENGE BUCCAL
Status: DISCONTINUED | OUTPATIENT
Start: 2021-08-19 | End: 2021-08-19 | Stop reason: HOSPADM

## 2021-08-19 RX ORDER — LABETALOL HYDROCHLORIDE 5 MG/ML
20 INJECTION, SOLUTION INTRAVENOUS ONCE
Status: COMPLETED | OUTPATIENT
Start: 2021-08-19 | End: 2021-08-19

## 2021-08-19 RX ORDER — ATORVASTATIN CALCIUM 80 MG/1
80 TABLET, FILM COATED ORAL NIGHTLY
Start: 2021-08-19

## 2021-08-19 RX ORDER — LISINOPRIL 10 MG/1
10 TABLET ORAL
Start: 2021-08-20

## 2021-08-19 RX ORDER — SODIUM CHLORIDE, SODIUM LACTATE, POTASSIUM CHLORIDE, CALCIUM CHLORIDE 600; 310; 30; 20 MG/100ML; MG/100ML; MG/100ML; MG/100ML
250 INJECTION, SOLUTION INTRAVENOUS ONCE
Status: COMPLETED | OUTPATIENT
Start: 2021-08-19 | End: 2021-08-19

## 2021-08-19 RX ADMIN — LABETALOL HYDROCHLORIDE 10 MG: 5 INJECTION, SOLUTION INTRAVENOUS at 00:18

## 2021-08-19 RX ADMIN — LABETALOL HYDROCHLORIDE 10 MG: 5 INJECTION, SOLUTION INTRAVENOUS at 04:05

## 2021-08-19 RX ADMIN — LEVOTHYROXINE SODIUM 25 MCG: 25 TABLET ORAL at 06:59

## 2021-08-19 RX ADMIN — CLOPIDOGREL BISULFATE 75 MG: 75 TABLET ORAL at 08:30

## 2021-08-19 RX ADMIN — LABETALOL HYDROCHLORIDE 20 MG: 5 INJECTION, SOLUTION INTRAVENOUS at 06:58

## 2021-08-19 RX ADMIN — FLUOXETINE 20 MG: 20 CAPSULE ORAL at 08:30

## 2021-08-19 RX ADMIN — IOPAMIDOL 100 ML: 755 INJECTION, SOLUTION INTRAVENOUS at 08:45

## 2021-08-19 RX ADMIN — HEPARIN SODIUM 5000 UNITS: 5000 INJECTION INTRAVENOUS; SUBCUTANEOUS at 15:38

## 2021-08-19 RX ADMIN — ATORVASTATIN CALCIUM 80 MG: 40 TABLET, FILM COATED ORAL at 00:18

## 2021-08-19 RX ADMIN — AMLODIPINE BESYLATE 5 MG: 5 TABLET ORAL at 12:12

## 2021-08-19 RX ADMIN — IOPAMIDOL 100 ML: 755 INJECTION, SOLUTION INTRAVENOUS at 06:16

## 2021-08-19 RX ADMIN — HEPARIN SODIUM 5000 UNITS: 5000 INJECTION INTRAVENOUS; SUBCUTANEOUS at 06:58

## 2021-08-19 RX ADMIN — MEMANTINE HYDROCHLORIDE 10 MG: 5 TABLET ORAL at 08:30

## 2021-08-19 RX ADMIN — LISINOPRIL 10 MG: 10 TABLET ORAL at 12:12

## 2021-08-19 RX ADMIN — SODIUM CHLORIDE, POTASSIUM CHLORIDE, SODIUM LACTATE AND CALCIUM CHLORIDE 250 ML/HR: 600; 310; 30; 20 INJECTION, SOLUTION INTRAVENOUS at 04:27

## 2021-08-19 RX ADMIN — Medication 1 TABLET: at 08:30

## 2021-08-19 RX ADMIN — SODIUM CHLORIDE, PRESERVATIVE FREE 10 ML: 5 INJECTION INTRAVENOUS at 00:30

## 2021-08-19 RX ADMIN — CHOLECALCIFEROL (VITAMIN D3) 25 MCG (1,000 UNIT) TABLET 2000 UNITS: TABLET at 08:30

## 2021-08-19 RX ADMIN — INSULIN ASPART 3 UNITS: 100 INJECTION, SOLUTION INTRAVENOUS; SUBCUTANEOUS at 12:13

## 2021-08-19 RX ADMIN — HEPARIN SODIUM 5000 UNITS: 5000 INJECTION INTRAVENOUS; SUBCUTANEOUS at 00:18

## 2021-08-19 RX ADMIN — SODIUM CHLORIDE, PRESERVATIVE FREE 10 ML: 5 INJECTION INTRAVENOUS at 08:30

## 2021-08-19 RX ADMIN — ASPIRIN 81 MG CHEWABLE TABLET 81 MG: 81 TABLET CHEWABLE at 08:30

## 2021-08-19 NOTE — CASE MANAGEMENT/SOCIAL WORK
Continued Stay Note  ALEX Tian     Patient Name: Magnolia Rios  MRN: [id number]  Today's Date: 8/19/2021    Admit Date: 8/18/2021    Discharge Plan     Row Name 08/19/21 1147       Plan    Plan  plan home with /family vs STR    Plan Comments  Spoke with patients daughter, Apurva Zambrano, who states patient is agreeable to STR. She has no preference of facility but would like as close to [facility] as possible. Discussed bed availability is limited, she is agreeable to referrals outside of [facility]. as well. Spoke with [facility] - await return call for availability. No beds available at [facility] or [facility]. LVM & in basket referral to [facility]. CM will continue to follow.    Row Name 08/19/21 0947       Plan    Plan  plan home w  and family to assist    Patient/Family in Agreement with Plan  yes    Plan Comments  Spoke with patient at bedside, PT working with patient. Permission given to speak with patients daughter, Apurva Zambrano, who is present in room. Face sheet verified. JAVIER explained, signed and copy declined. Apurva states her mother and father live in a home with ramp access. Mrs Rios requires assistance with most ADLs as she is prone to falls from a bad knee. Apurva states she has 3 siblings and between the four of them, they check on/assist their parents daily, providing meals and helping as needed. They also have paid care givers that stay with their parents when the children are not available. The patient does not drive. Family provides transportation as needed. Per Apurva, patient uses a rollator. Noted additional DME charted. The patient has not used HH or inpatient rehab previously. Apurva feels inpatient rehab may be beneficial for patient, but is not sure if her mother would be willing. Patient uses [facility] [place] and there are no issues obtaining medications. CM # placed on white board, will follow for dc needs. PT comes to CM  "office and indicates patient would benefit from STR, patient and her daughter are \"discussing it\" at this time. CM will follow up to assist with dc planning.        Discharge Codes    No documentation.             Stefan Ortiz RN    "

## 2021-08-19 NOTE — PLAN OF CARE
Goal Outcome Evaluation:  Plan of Care Reviewed With: patient, daughter           Outcome Summary: SLP: Completed screening. Pt and daughter with no complaints of speech difficulty. Pt able to converse with SLP and daughter without any deficits noted. Reports she is having no difficulty swallowing and passed RN Stroke Dysphagia Screen. No need for full evaluation of communication skills at this time due to pt being at baseline status.

## 2021-08-19 NOTE — PLAN OF CARE
Goal Outcome Evaluation:  Plan of Care Reviewed With: patient        Progress: no change  Outcome Summary: patient NIHSS score this shift 6. plan for patient to have CT neck/head and ECHO today. continue to monitor vital signs and telemetry.

## 2021-08-19 NOTE — DISCHARGE PLACEMENT REQUEST
"Zachary Rios (86 y.o. Female)     Date of Birth Social Security Number Address Home Phone MRN    [id number]  [id number] [facility] [address] [phone] [id number]    [employer] Marital Status          Mosque        Admission Date Admission Type Admitting Provider Attending Provider Department, Room/Bed    8/18/21 Emergency Jimena Babcock MD Salmon Echanique, Cristina D, MD [facility] MED SURG, 1409/1    Discharge Date Discharge Disposition Discharge Destination                       Attending Provider: Jimena Babcock MD    Allergies: Penicillins    Isolation: None   Infection: None   Code Status: CPR    Ht: 172 cm (67.72\")   Wt: 81 kg (178 lb 9.2 oz)    Admission Cmt: None   Principal Problem: None                Active Insurance as of 8/18/2021     Primary Coverage     Payor Plan Insurance Group Employer/Plan Group    HUMANA MEDICARE REPLACEMENT HUMANA MEDICARE REPLACEMENT [id number]     Payor Plan Address Payor Plan Phone Number Payor Plan Fax Number Effective Dates    [address] [address] [phone]  1/1/2013 - None Entered    [facility] [address]       Subscriber Name Subscriber Birth Date Member ID       ZACHARY RIOS 1934 [id number]                 Emergency Contacts      (Rel.) Home Phone Work Phone Mobile Phone    GabrielManuelito (Spouse) [phone] -- --    Apurva Zambrano (Daughter) -- -- [phone]    Rain Lee (Daughter) -- -- [phone]              "

## 2021-08-19 NOTE — PLAN OF CARE
Goal Outcome Evaluation:  Plan of Care Reviewed With: patient, daughter           Outcome Summary: Physical therapy evaluation complete.  Patient performs supine to sit with min assist and sit to stand with CGA.  Patient performs gait with rolling walker x5 feet, CGA with verbal cues for safety and sequencing during direction changes.  Patient and daughter report ambulation with walker short distances within the home and state patient receives assistance from children and hired caregivers as needed.  Patient reports current mobility is not at baseline and daughter expresses interest in short term rehab prior to returning home.  Patient may benefit from short term rehab if current mobility does not improve, will continue to follow.

## 2021-08-19 NOTE — CASE MANAGEMENT/SOCIAL WORK
Discharge Planning Assessment  ALEX Tian     Patient Name: Magnolia Rios  MRN: [id number]  Today's Date: 8/19/2021    Admit Date: 8/18/2021    Discharge Needs Assessment     Row Name 08/19/21 0945       Living Environment    Lives With  spouse    Current Living Arrangements  home/apartment/condo    Primary Care Provided by  spouse/significant other;child(molly);other (see comments)    Provides Primary Care For  no one, unable/limited ability to care for self    Family Caregiver if Needed  child(molly), adult;spouse    Quality of Family Relationships  involved;supportive    Able to Return to Prior Arrangements  yes       Resource/Environmental Concerns    Resource/Environmental Concerns  none    Transportation Concerns  car, none       Transition Planning    Patient/Family Anticipates Transition to  home with family    Patient/Family Anticipated Services at Transition  none    Transportation Anticipated  family or friend will provide       Discharge Needs Assessment    Readmission Within the Last 30 Days  no previous admission in last 30 days    Equipment Currently Used at Home  rollator    Concerns to be Addressed  no discharge needs identified    Equipment Needed After Discharge  none    Provided Post Acute Provider List?  N/A    Provided Post Acute Provider Quality & Resource List?  N/A        Discharge Plan     Row Name 08/19/21 0947       Plan    Plan  plan home w  and family to assist    Patient/Family in Agreement with Plan  yes    Plan Comments  Spoke with patient at bedside, PT working with patient. Permission given to speak with patients daughter, Apurva Zambrano, who is present in room. Face sheet verified. JAVIER explained, signed and copy declined. Apurva states her mother and father live in a home with ramp access. Mrs Rios requires assistance with most ADLs as she is prone to falls from a bad knee. Apurva states she has 3 siblings and between the four of them, they check on/assist their parents  "daily, providing meals and helping as needed. They also have paid care givers that stay with their parents when the children are not available. The patient does not drive. Family provides transportation as needed. Per Apurva, patient uses a rollator. Noted additional DME charted. The patient has not used HH or inpatient rehab previously. Apurva feels inpatient rehab may be beneficial for patient, but is not sure if her mother would be willing. Patient uses [facility] [place] and there are no issues obtaining medications. CM # placed on white board, will follow for dc needs. PT comes to CM office and indicates patient would benefit from STR, patient and her daughter are \"discussing it\" at this time. CM will follow up to assist with dc planning.        Continued Care and Services - Admitted Since 8/18/2021    Coordination has not been started for this encounter.         Demographic Summary     Row Name 08/19/21 0944       General Information    Admission Type  observation    Arrived From  home    Required Notices Provided  Observation Status Notice    Referral Source  admission list    Reason for Consult  discharge planning    Preferred Language  English     Used During This Interaction  no       Contact Information    Permission Granted to Share Info With          Functional Status    No documentation.       Psychosocial    No documentation.       Abuse/Neglect    No documentation.       Legal    No documentation.       Substance Abuse    No documentation.       Patient Forms    No documentation.           Stefan Ortiz RN    "

## 2021-08-19 NOTE — ED PROVIDER NOTES
Subjective   History of Present Illness  86-year-old female presents to the emergency room complaining of right arm and leg heaviness and weakness that started between 4 and 5 this evening and are mostly resolved at this point.  She says that they felt heavy and she was having difficulty moving her arm.  She is unsure when the symptoms resolved.  She currently denies any sensory deficits and says that she feels like she can move them better than she could earlier.  Never had any difficulty speaking and never had any difficulty swallowing never had any visual symptoms or dizziness.  Review of Systems   Constitutional: Negative for chills and fever.   Respiratory: Negative for chest tightness and shortness of breath.    Cardiovascular: Negative for chest pain and palpitations.   Gastrointestinal: Negative for nausea and vomiting.   Musculoskeletal: Negative for arthralgias and joint swelling.   Neurological: Positive for weakness.       Past Medical History:   Diagnosis Date   • Depression    • Heart palpitations    • Panic attack        Allergies   Allergen Reactions   • Penicillins        Past Surgical History:   Procedure Laterality Date   • COLONOSCOPY     • HYSTERECTOMY         Family History   Problem Relation Age of Onset   • No Known Problems Mother    • Seizures Father    • Breast cancer Neg Hx        Social History     Socioeconomic History   • Marital status:      Spouse name: Not on file   • Number of children: 4   • Years of education: Not on file   • Highest education level: Not on file   Tobacco Use   • Smoking status: Former Smoker     Types: Cigarettes     Quit date: 1989     Years since quittin.9   • Smokeless tobacco: Never Used   Vaping Use   • Vaping Use: Never used   Substance and Sexual Activity   • Alcohol use: Yes     Alcohol/week: 1.0 standard drinks     Types: 1 Cans of beer per week     Comment: occ   • Drug use: No   • Sexual activity: Yes     Partners: Male     Birth  control/protection: Post-menopausal           Objective    ED Triage Vitals   Temp Heart Rate Resp BP SpO2   08/18/21 2030 08/18/21 2024 08/18/21 2024 08/18/21 2024 08/18/21 2024   98.6 °F (37 °C) 87 16 176/92 96 %      Temp src Heart Rate Source Patient Position BP Location FiO2 (%)   08/18/21 2030 -- -- -- --   Oral           Physical Exam  INITIAL VITAL SIGNS: Reviewed by me.  Pulse ox normal  GENERAL: Alert and interactive. No acute distress.  HEAD: Head is normocephalic.  EYES: EOMI. PERRL. No scleral icterus. No conjunctival injection.  ENT: Moist mucous membranes.   NECK: Supple. Full range of motion.  RESPIRATORY: No tachypnea. Clear breath sounds bilaterally. No wheezing. No rales. No rhonchi.  CV: Regular rate and rhythm. No murmurs. No rubs or gallops.  ABDOMEN: Soft, non-distended, non-tender. No guarding. No rebound. No masses.   BACK:  No obvious deformity.  EXTREMITIES: No deformity. No clubbing or cyanosis. No edema.   SKIN: Warm and dry. No diaphoresis. No obvious rashes.   NEUROLOGIC: Alert and oriented. Face is symmetric. Speech is normal.  Normal sensation all 4 extremities.  Slightly weak in general but seems to have symmetric strength.  Was able to walk from the wheelchair to the bed unassisted.  Normal visual fields normal speech  Results for orders placed or performed during the hospital encounter of 08/18/21   Comprehensive Metabolic Panel    Specimen: Blood   Result Value Ref Range    Glucose 190 (H) 65 - 99 mg/dL    BUN 28 (H) 8 - 23 mg/dL    Creatinine 1.64 (H) 0.57 - 1.00 mg/dL    Sodium 136 136 - 145 mmol/L    Potassium 4.3 3.5 - 5.2 mmol/L    Chloride 102 98 - 107 mmol/L    CO2 25.0 22.0 - 29.0 mmol/L    Calcium 9.4 8.6 - 10.5 mg/dL    Total Protein 6.4 6.0 - 8.5 g/dL    Albumin 3.80 3.50 - 5.20 g/dL    ALT (SGPT) 9 1 - 33 U/L    AST (SGOT) 13 1 - 32 U/L    Alkaline Phosphatase 102 39 - 117 U/L    Total Bilirubin 0.3 0.0 - 1.2 mg/dL    eGFR Non African Amer 30 (L) >60 mL/min/1.73     Globulin 2.6 gm/dL    A/G Ratio 1.5 g/dL    BUN/Creatinine Ratio 17.1 7.0 - 25.0    Anion Gap 9.0 5.0 - 15.0 mmol/L   Protime-INR    Specimen: Blood   Result Value Ref Range    Protime 13.8 12.1 - 15.0 Seconds    INR 1.06 0.90 - 1.10   aPTT    Specimen: Blood   Result Value Ref Range    PTT 28.6 24.3 - 38.1 seconds   Troponin    Specimen: Blood   Result Value Ref Range    Troponin T <0.010 0.000 - 0.030 ng/mL   CBC Auto Differential    Specimen: Blood   Result Value Ref Range    WBC 9.77 3.40 - 10.80 10*3/mm3    RBC 4.15 3.77 - 5.28 10*6/mm3    Hemoglobin 12.0 12.0 - 15.9 g/dL    Hematocrit 37.1 34.0 - 46.6 %    MCV 89.4 79.0 - 97.0 fL    MCH 28.9 26.6 - 33.0 pg    MCHC 32.3 31.5 - 35.7 g/dL    RDW 13.1 12.3 - 15.4 %    RDW-SD 42.5 37.0 - 54.0 fl    MPV 10.7 6.0 - 12.0 fL    Platelets 248 140 - 450 10*3/mm3    Neutrophil % 62.4 42.7 - 76.0 %    Lymphocyte % 25.9 19.6 - 45.3 %    Monocyte % 9.8 5.0 - 12.0 %    Eosinophil % 1.1 0.3 - 6.2 %    Basophil % 0.5 0.0 - 1.5 %    Immature Grans % 0.3 0.0 - 0.5 %    Neutrophils, Absolute 6.09 1.70 - 7.00 10*3/mm3    Lymphocytes, Absolute 2.53 0.70 - 3.10 10*3/mm3    Monocytes, Absolute 0.96 (H) 0.10 - 0.90 10*3/mm3    Eosinophils, Absolute 0.11 0.00 - 0.40 10*3/mm3    Basophils, Absolute 0.05 0.00 - 0.20 10*3/mm3    Immature Grans, Absolute 0.03 0.00 - 0.05 10*3/mm3    nRBC 0.0 0.0 - 0.2 /100 WBC   POC Glucose Once    Specimen: Blood   Result Value Ref Range    Glucose 180 (H) 70 - 130 mg/dL   ECG 12 Lead   Result Value Ref Range    QT Interval 414 ms     XR Chest 1 View    Result Date: 8/18/2021  CR Chest 1 Vw INDICATION: Right-sided weakness, dizziness COMPARISON:  None available. FINDINGS: Single portable AP view(s) of the chest.  The heart and mediastinal contours are normal. The lungs are clear apart from chronic changes. No pneumothorax or pleural effusion.     No acute cardiopulmonary findings. Signer Name: Karen Herr MD  Signed: 8/18/2021 8:54 PM  Workstation Name:  [id number]  [facility]    CT Head Without Contrast Stroke Protocol    Result Date: 8/18/2021  CT Head WO Code Stroke HISTORY: Right sided weakness since approximately 5:00 PM today. TECHNIQUE: Axial unenhanced head CT. Radiation dose reduction techniques included automated exposure control or exposure modulation based on body size. Count of known CT and cardiac nuc med studies performed in previous 12 months: 0. Time of scan: 8:17 PM COMPARISON: June 13, 2019 FINDINGS: The ventricles are enlarged. The degree of ventricular enlargement is out of proportion to the degree of cortical atrophy. This finding can be seen in normal pressure hydrocephalus. These findings were present on the prior study of June 13, 2019 and appear unchanged. No parenchymal or subarachnoid hemorrhage. No extra-axial fluid collection. No significant shift of midline structures. There are periventricular hypodensities which are nonspecific but likely represent microvascular disease. Mastoid air cells are clear. Paranasal sinuses are clear. No acute orbital findings.     1.  No acute intracranial findings. 2.  There is ventricular enlargement which appears out of proportion to the degree of cortical atrophy. This finding can be seen in normal pressure hydrocephalus. Findings are similar to the prior study of June 13, 2019. Signer Name: Ayden Fonseca MD  Signed: 8/18/2021 8:31 PM  Workstation Name: [id number]  [facility]      Procedures      EKG           EKG time/Interp time: 2031/2040  Rhythm/Rate: Sinus rhythm rate of 84  P waves and CA: Normal P waves with normal CA interval  QRS, axis: Normal QRS duration with slight left axis deviation  ST and T waves: No ST elevations or depressions    Independently interpreted by me contemporaneously with treatment        ED Course  ED Course as of Aug 18 2121   Wed Aug 18, 2021   2044 Patient with a history of difficulty moving her right arm and leg about  4 5 this evening.  By time of my evaluation she says her symptoms are gone.  On exam she is slightly weak in general but seems to have symmetric strength, she has normal sensation no difficulty speaking.  Discussed with stroke attending  who feels that with her symptoms having resolved she is not a TPA candidate and as long as a head CT is normal she can be admitted here and have a CTA and MRI in the morning and that she does not need any medications aside from aspirin.    [RO]   2120 Discussed with Dr. Pugh who accepts patient for observation.    [RO]      ED Course User Index  [RO] Juma You MD                                           [facility]    Final diagnoses:   TIA (transient ischemic attack)       ED Disposition  ED Disposition     ED Disposition Condition Comment    Decision to Admit  Level of Care: Telemetry [5]   Diagnosis: TIA (transient ischemic attack) [214377]   Admitting Physician: YARI RUIZ [[id number]]            No follow-up provider specified.       Medication List      No changes were made to your prescriptions during this visit.          Juma You MD  08/18/21 2122

## 2021-08-19 NOTE — THERAPY EVALUATION
Acute Care - Occupational Therapy Initial Evaluation   Anai Jefferson     Patient Name: Magnolia Rios  : 1934  MRN: [id number]  Today's Date: 2021  Onset of Illness/Injury or Date of Surgery: 21  Date of Referral to OT: 21  Referring Physician: Dr Pugh    Admit Date: 2021       ICD-10-CM ICD-9-CM   1. TIA (transient ischemic attack)  G45.9 435.9     Patient Active Problem List   Diagnosis   • Panic attack   • Mixed hyperlipidemia   • Type 2 diabetes mellitus without complication, without long-term current use of insulin (CMS/HCC)   • Mild episode of recurrent major depressive disorder (CMS/HCC)   • Menopause   • Medication monitoring encounter   • Medicare annual wellness visit, subsequent   • Vaccination not carried out   • Primary osteoarthritis of both knees   • Memory problem   • Subclinical hypothyroidism   • TIA (transient ischemic attack)     Past Medical History:   Diagnosis Date   • Arthritis    • Depression    • Diabetes mellitus (CMS/HCC)    • Disease of thyroid gland    • Heart palpitations    • Hyperlipidemia    • Panic attack      Past Surgical History:   Procedure Laterality Date   • COLONOSCOPY     • HYSTERECTOMY              OT ASSESSMENT FLOWSHEET (last 12 hours)      OT Evaluation and Treatment     Row Name 21 [id number]                   OT Time and Intention    Subjective Information  complains of;weakness  -JJ        Document Type  evaluation  -JJ        Mode of Treatment  occupational therapy  -JJ        Patient Effort  good  -JJ           General Information    Patient Profile Reviewed  yes  -JJ        Onset of Illness/Injury or Date of Surgery  21  -JJ        Referring Physician  Dr Pugh  -JJ        Patient/Family/Caregiver Comments/Observations  pt supine in bed, daughter in room, agreed to evaluation  -JJ        Prior Level of Function  -- see pertinent hx of current problem  -JJ        Equipment Currently Used at Home  walker, rolling;shower chair   "-JJ        Pertinent History of Current Functional Problem  pt admitted to hospital with weakness of R UE/LE and increased confusion. pt with hx of dementia and is poor historian. daughter in room states pt ambulates at baseline with RW. pt lives with spouse and daughter and 3 siblings as well as hired caregivers assist pt as needed 24 hours. daughter states she assists with showers and pt is able to dress herself  however \"has difficulties\"  -JJ        Existing Precautions/Restrictions  fall  -JJ        Limitations/Impairments  safety/cognitive  -JJ        Risks Reviewed  patient and family:;LOB  -JJ        Benefits Reviewed  patient and family:;improve function;increase independence  -JJ        Barriers to Rehab  previous functional deficit;cognitive status  -JJ           Living Environment    Current Living Arrangements  home/apartment/condo  -JJ        Lives With  spouse assist from family  -JJ           Cognition    Orientation Status (Cognition)  oriented x 3  -JJ        Follows Commands (Cognition)  WFL;follows one-step commands;repetition of directions required;verbal cues/prompting required  -JJ        Personal Safety Interventions  gait belt;nonskid shoes/slippers when out of bed  -JJ           Pain Assessment    Additional Documentation  Pain Scale: Numbers Pre/Post-Treatment (Group)  -JJ           Pain Scale: Numbers Pre/Post-Treatment    Pre/Posttreatment Pain Comment  pt no co pain  -JJ           Range of Motion (ROM)    Range of Motion  ROM is WFL;bilateral upper extremities  -JJ           Strength (Manual Muscle Testing)    Strength (Manual Muscle Testing)  bilateral upper extremities;strength is WFL  -JJ           Bed Mobility    Supine-Sit [place] (Bed Mobility)  minimum assist (75% patient effort);verbal cues  -JJ        Sit-Supine [place] (Bed Mobility)  minimum assist (75% patient effort);verbal cues  -JJ        Assistive Device (Bed Mobility)  bed rails;head of bed elevated  -JJ     "       Functional Mobility    Functional Mobility- Ind. Level  contact guard assist  -JJ        Functional Mobility- Device  rolling walker  -J        Functional Mobility-Distance (Feet)  5 from bed to chair  -           Transfer Assessment/Treatment    Transfers  sit-stand transfer;stand-sit transfer  -J        Comment (Transfers)  cues for hand placement  -JJ           Transfers    Sit-Stand [place] (Transfers)  contact guard;verbal cues  -JJ        Stand-Sit [place] (Transfers)  contact guard;verbal cues  -JJ           Sit-Stand Transfer    Assistive Device (Sit-Stand Transfers)  walker, front-wheeled  -JJ           Stand-Sit Transfer    Assistive Device (Stand-Sit Transfers)  walker, front-wheeled  -JJ           Activities of Daily Living    BADL Assessment/Intervention  -- anticipate mod assist for adls  -JJ           Plan of Care Review    Plan of Care Reviewed With  patient  -JJ        Outcome Summary  OT evaluation completed.  pt required min assist for bed mobility. pt required CGA for functional transfers and mobility x 5 feet with RW. anticipate pt will require mod assist for adls. pt has hx of dementia and is poor historian however is oriented x 3. Daughter states her and her siblings assist pt and spouse and they supplement assist with paid caregivers when family isnt available. pt and daughter both states pt weaker now than baseline. pt would benefit from skilled OT services to address transfers, adls and balance, will see during hospitalization. pt would benefit from SNF upon discharge from facility  -JJ           Transfer Goal 1 (OT)    Activity/Assistive Device (Transfer Goal 1, OT)  toilet;commode, 3-in-1;walker, rolling  -J        [place] Level/Cues Needed (Transfer Goal 1, OT)  standby assist  -JJ        Time Frame (Transfer Goal 1, OT)  5 days  -JJ        Strategies/Barriers (Transfers Goal 1, OT)  cognition  -JJ        Progress/Outcome (Transfer Goal 1, OT)  -- new goal  -JJ            Dressing Goal 1 (OT)    Activity/Device (Dressing Goal 1, OT)  lower body dressing  -JJ        [place]/Cues Needed (Dressing Goal 1, OT)  minimum assist (75% or more patient effort)  -JJ        Time Frame (Dressing Goal 1, OT)  5 days  -JJ        Progress/Outcome (Dressing Goal 1, OT)  -- new goal  -JJ           Balance Goal 1 (OT)    Activity/Assistive Device (Balance Goal 1, OT)  standing, dynamic;walker, rolling  -JJ        [place] Level/Cues Needed (Balance Goal 1, OT)  contact guard assist  x 1-2 min to increase I with adls  -JJ        Time Frame (Balance Goal 1, OT)  5 days  -JJ        Barriers (Balance Goal 1, OT)  cogntion  -JJ        Progress/Outcomes (Balance Goal 1, OT)  -- new goal  -JJ           Positioning and Restraints    Pre-Treatment Position  in bed  -JJ        Post Treatment Position  chair  -JJ        In Chair  reclined;call light within reach;encouraged to call for assist;with family/caregiver  -           Therapy Assessment/Plan (OT)    Date of Referral to OT  08/19/21  -        Patient/Family Therapy Goal Statement (OT)  pt and daughter agreeable to SNF  -        OT Diagnosis  weakness sp hospitalization  -JJ        Rehab Potential (OT)  good, to achieve stated therapy goals  -        Criteria for Skilled Therapeutic Interventions Met (OT)  yes;skilled treatment is necessary  -        Therapy Frequency (OT)  5 times/wk  -        Predicted Duration of Therapy Intervention (OT)  x 1 week  -J        Problem List (OT)  balance;coordination;mobility;strength;inability to direct their own care  -J        Activity Limitations Related to Problem List (OT)  unable to ambulate safely;unable to transfer safely;BADLs not performed adequately or safely  -J        Planned Therapy Interventions (OT)  BADL retraining;functional balance retraining;transfer/mobility retraining;patient/caregiver education/training  -           Therapy Plan Review/Discharge Plan (OT)     Anticipated Discharge Disposition (OT)  skilled nursing facility  -NAVEEN          User Key  (r) = Recorded By, (t) = Taken By, (c) = Cosigned By    Initials Name Effective Dates    Jackelin Marie OTR 06/16/21 -            Occupational Therapy Education                 Title: PT OT SLP Therapies (Done)     Topic: Occupational Therapy (Done)     Point: ADL training (Done)     Description:   Instruct learner(s) on proper safety adaptation and remediation techniques during self care or transfers.   Instruct in proper use of assistive devices.              Learning Progress Summary           Patient Acceptance, E,TB, VU by NAVEEN at 8/19/2021 1351    Comment: pt educated on adls, functional transfers and mobility                               User Key     Initials Effective Dates Name Provider Type Discipline    NAVEEN 06/16/21 -  Jackelin Fernández, OTR Occupational Therapist OT                  OT Recommendation and Plan  Planned Therapy Interventions (OT): BADL retraining, functional balance retraining, transfer/mobility retraining, patient/caregiver education/training  Therapy Frequency (OT): 5 times/wk  Plan of Care Review  Plan of Care Reviewed With: patient  Outcome Summary: OT evaluation completed.  pt required min assist for bed mobility. pt required CGA for functional transfers and mobility x 5 feet with RW. anticipate pt will require mod assist for adls. pt has hx of dementia and is poor historian however is oriented x 3. Daughter states her and her siblings assist pt and spouse and they supplement assist with paid caregivers when family isnt available. pt and daughter both states pt weaker now than baseline. pt would benefit from skilled OT services to address transfers, adls and balance, will see during hospitalization. pt would benefit from SNF upon discharge from facility  Plan of Care Reviewed With: patient  Outcome Summary: OT evaluation completed.  pt required min assist for bed mobility. pt  required CGA for functional transfers and mobility x 5 feet with RW. anticipate pt will require mod assist for adls. pt has hx of dementia and is poor historian however is oriented x 3. Daughter states her and her siblings assist pt and spouse and they supplement assist with paid caregivers when family isnt available. pt and daughter both states pt weaker now than baseline. pt would benefit from skilled OT services to address transfers, adls and balance, will see during hospitalization. pt would benefit from SNF upon discharge from facility    Outcome Measures     Row Name 08/19/21 0930             How much help from another is currently needed...    Putting on and taking off regular lower body clothing?  2  -JJ      Bathing (including washing, rinsing, and drying)  2  -JJ      Toileting (which includes using toilet bed pan or urinal)  2  -JJ      Putting on and taking off regular upper body clothing  3  -JJ      Taking care of personal grooming (such as brushing teeth)  3  -JJ      Eating meals  4  -JJ      AM-PAC 6 Clicks Score (OT)  16  -JJ         Functional Assessment    Outcome Measure Options  AM-PAC 6 Clicks Daily Activity (OT)  -J        User Key  (r) = Recorded By, (t) = Taken By, (c) = Cosigned By    Initials Name Provider Type    Jackelin Marie OTR Occupational Therapist          Time Calculation:   Time Calculation- OT     Row Name 08/19/21 1353             Time Calculation- OT    OT Start Time  0924  -J      OT Stop Time  0954  -      OT Time Calculation (min)  30 min  -        User Key  (r) = Recorded By, (t) = Taken By, (c) = Cosigned By    Initials Name Provider Type    Jackelin Marie OTR Occupational Therapist        Therapy Charges for Today     Code Description Service Date Service Provider Modifiers Qty    [id number] HC OT EVAL LOW COMPLEXITY 2 8/19/2021 Jackelin Fernández OTR GO 1               LLUVIA Wagner  8/19/2021

## 2021-08-19 NOTE — CASE MANAGEMENT/SOCIAL WORK
Continued Stay Note  ALEX Tian     Patient Name: Magnolia Rios  MRN: [id number]  Today's Date: 8/19/2021    Admit Date: 8/18/2021    Discharge Plan     Row Name 08/19/21 1502       Plan    Plan  plan home with /family vs STR    Plan Comments  Follow up visit, patient is dozing while sitting up in recliner. No family present at this time. Return call from [facility] who states they are out of network with patients insurance, she has a call out to patients daughter, Apurva Zambrano, to discuss co-pay/financials if they would like to proceed with STR at their facility. Informed there are additional referrals that have been made. CM will update family once response from other facilities rec'd.    Row Name 08/19/21 1357       Plan    Plan  plan home w /family vs STR    Plan Comments  Return call from [facility] who states they are able to review patient for STR - referral given. CM will continue to follow.    Row Name 08/19/21 1311       Plan    Plan  plan home w /family vs STR    Plan Comments  Call to [facility] to follow up on referral made earlier. [facility] is not available and no other  available at this time. Spoke with [facility], they have no beds available. Spoke with [facility] - in basket referral sent. CM will continue to follow.        Discharge Codes    No documentation.             Stefan Ortiz RN

## 2021-08-19 NOTE — H&P
"[facility]   HISTORY AND PHYSICAL    Patient Name: Magnolia Rios  : 1934  MRN: [id number]  Primary Care Physician:  Nathen Bailey MD  Date of admission: 2021    Subjective   Subjective     Chief Complaint: right sided weakness    This is an 87 y/o female with hx of dementia, DM, HTN, HLD who was brought in to ED by family.   Per patient she feels \"fine\" she has pain on her right knee and has frequent falls.   Per daughter at bedside, who is providing history, patient was last seen by her on Saturday and was in her normal state of health. Patient has had right knee pain and weakness that has been progressively worsening within the last 3-4 years and has been told she has OA.   Patient today while trying to get up from a chair noted to be weak and needing total assistance to get into a wheelchair and unable to move either leg and noticed to have jerky movements of right hand at rest. Also noted to have RUL weakness and , dropping food and unable to hold a spoon while eating dinner. No noted slurring of speech.   Per daughter patient also unable to change diaper pants frequently and concerned for UTI.       Review of Systems   HENT: Negative.    Eyes: Negative.    Respiratory: Negative.    Cardiovascular: Negative.    Gastrointestinal: Negative.    Endocrine: Negative.    Genitourinary: Positive for frequency.   Musculoskeletal: Positive for arthralgias and gait problem.   Skin: Negative.    Allergic/Immunologic: Negative.    Neurological: Positive for tremors and weakness.   Hematological: Negative.    Psychiatric/Behavioral: Positive for confusion.   All other systems reviewed and are negative.       Personal History     Past Medical History:   Diagnosis Date   • Arthritis    • Depression    • Diabetes mellitus (CMS/HCC)    • Disease of thyroid gland    • Heart palpitations    • Hyperlipidemia    • Panic attack        Past Surgical History:   Procedure Laterality Date   • COLONOSCOPY   "   • HYSTERECTOMY         Family History: family history includes No Known Problems in her mother; Seizures in her father. Otherwise pertinent FHx was reviewed and not pertinent to current issue.    Social History:  reports that she quit smoking about 31 years ago. Her smoking use included cigarettes. She has never used smokeless tobacco. She reports current alcohol use of about 1.0 standard drinks of alcohol per week. She reports that she does not use drugs.    Home Medications:  ALPRAZolam, FLUoxetine, ezetimibe, levothyroxine, meclizine, meloxicam, memantine, metFORMIN ER, and simvastatin    Allergies:  Allergies   Allergen Reactions   • Penicillins        Objective    Objective     Vitals:   Temp:  [98 °F (36.7 °C)-98.6 °F (37 °C)] 98 °F (36.7 °C)  Heart Rate:  [73-87] 73  Resp:  [16] 16  BP: (173-192)/() 192/94    Physical Exam  Vitals and nursing note reviewed.   Constitutional:       General: She is not in acute distress.     Appearance: Normal appearance. She is normal weight. She is not ill-appearing.   HENT:      Head: Normocephalic and atraumatic.      Right Ear: External ear normal.      Left Ear: External ear normal.      Nose: Nose normal. No congestion or rhinorrhea.      Mouth/Throat:      Mouth: Mucous membranes are moist.      Pharynx: No oropharyngeal exudate or posterior oropharyngeal erythema.   Eyes:      Extraocular Movements: Extraocular movements intact.      Conjunctiva/sclera: Conjunctivae normal.      Pupils: Pupils are equal, round, and reactive to light.   Cardiovascular:      Rate and Rhythm: Normal rate and regular rhythm.      Pulses: Normal pulses.      Heart sounds: Normal heart sounds.   Pulmonary:      Effort: Pulmonary effort is normal.      Breath sounds: Normal breath sounds.   Abdominal:      General: Abdomen is flat. Bowel sounds are normal.      Palpations: Abdomen is soft.   Musculoskeletal:         General: No swelling or tenderness. Normal range of motion.       Cervical back: Normal range of motion and neck supple.      Right lower leg: No edema.      Left lower leg: No edema.   Skin:     General: Skin is warm.   Neurological:      Mental Status: She is alert. Mental status is at baseline.      GCS: GCS eye subscore is 4. GCS verbal subscore is 5. GCS motor subscore is 6.      Cranial Nerves: No cranial nerve deficit.      Sensory: No sensory deficit.      Motor: Weakness present.      Comments: RUE 4/5, LUE 5/5  RLE 3/5, LLE 4/5   Psychiatric:         Mood and Affect: Mood normal.         Result Review    Result Review:  I have personally reviewed the results from the time of this admission to 8/18/2021 23:18 EDT and agree with these findings:  [x]  Laboratory  []  Microbiology  [x]  Radiology  [x]  EKG/Telemetry   []  Cardiology/Vascular   []  Pathology  [x]  Old records  []  Other:  Most notable findings include: normal imaging, abnormal SrCR    Assessment/Plan   Assessment / Plan     Brief Patient Summary:  Magnolia Rios is a 86 y.o. female who is admitted for TIA, r/o stroke    Active Hospital Problems:  Active Hospital Problems    Diagnosis    • TIA (transient ischemic attack)      Plan:   -obtain further imaging studies, CTA head and neck, consider MRI in AM  -obtain stroke work up (ESR, CRP, B12, RPR, lipid panel, TSH, folic acid, A1c)  -neurology consulted in ER, will see patient later tonight or tomorrow   -obtain Echo, EKG reviewed  -PT/OT/speech consulted, recs appreciated  -permissive HTN, hold home BP meds  -given 325mg asa in ED, continue 81mg in AM, atorvastatin, plavix  -fall/seizure/aspiration precautions    DVT prophylaxis:  Medical DVT prophylaxis orders are present.    CODE STATUS:    Level Of Support Discussed With: Patient  Code Status: CPR  Medical Interventions (Level of Support Prior to Arrest): Full    Admission Status:  I believe this patient meets observation status.    Electronically signed by Jimena Mendez MD, 08/18/21,  9:20 PM EDT.

## 2021-08-19 NOTE — CASE MANAGEMENT/SOCIAL WORK
Continued Stay Note  ALEX Tian     Patient Name: Magnolia Rios  MRN: [id number]  Today's Date: 8/19/2021    Admit Date: 8/18/2021    Discharge Plan     Row Name 08/19/21 1659       Plan    Plan  plan STR [facility]    Plan Comments  Per [facility] able to accept patient today. Spoke with patients daughter who is agreeable to [facility] for STR and states her brother, Mina, should be visiting patient currently and would be able to transport patient. Spoke with patient and her son, Mina, at bedside. Patient is agreeable to STR at [facility]. Mina is available to transport patient to facility. Message to Gopi APRN to update. Pamela/ also aware of plan. CM will continue to follow.    Row Name 08/19/21 1502       Plan    Plan  plan home with /family vs STR    Plan Comments  Return call from [facility] who states they are out of network with patients insurance, she has a call out to patients daughter, Apurva Zambrano, to discuss co-pay/financials if they would like to proceed with STR at their facility. Informed there are additional referrals that have been made. CM will update family once response from other facilities rec'd.        Discharge Codes    No documentation.             Stefan Ortiz RN

## 2021-08-19 NOTE — CASE MANAGEMENT/SOCIAL WORK
Continued Stay Note  ALEX Tian     Patient Name: Magnolia Rios  MRN: [id number]  Today's Date: 8/19/2021    Admit Date: 8/18/2021    Discharge Plan     Row Name 08/19/21 1357       Plan    Plan  plan home w /family vs STR    Plan Comments  Return call from [facility] who states they are able to review patient for STR - referral given. CM will continue to follow.    Row Name 08/19/21 1311       Plan    Plan  plan home w /family vs STR    Plan Comments  Call to [facility] to follow up on referral made earlier. [facility] is not available and no other  available at this time. Spoke with [facility], they have no beds available. Spoke with [facility] - in basket referral sent. CM will continue to follow.    Row Name 08/19/21 1147       Plan    Plan  plan home with /family vs STR    Plan Comments  Spoke with patients daughter, Apurva Zambrano, who states patient is agreeable to STR. She has no preference of facility but would like as close to [facility] as possible. Discussed bed availability is limited, she is agreeable to referrals outside of [facility]. as well. Spoke with Ar[facility] spain return call for availability. No beds available at [place] or [facility]. LVM & in basket referral to [facility]. CM will continue to follow.        Discharge Codes    No documentation.             Stefan Ortiz RN

## 2021-08-19 NOTE — PLAN OF CARE
Goal Outcome Evaluation:  Plan of Care Reviewed With: patient           Outcome Summary: OT evaluation completed.  pt required min assist for bed mobility. pt required CGA for functional transfers and mobility x 5 feet with RW. anticipate pt will require mod assist for adls. pt has hx of dementia and is poor historian however is oriented x 3. Daughter states her and her siblings assist pt and spouse and they supplement assist with paid caregivers when family isnt available. pt and daughter both states pt weaker now than baseline. pt would benefit from skilled OT services to address transfers, adls and balance, will see during hospitalization. pt would benefit from SNF upon discharge from facility

## 2021-08-19 NOTE — PROGRESS NOTES
MRI brain confirms a small acute lacunar stroke in the left external capsule, personally reviewed. Recommend DAPT, statin, BP control, rehab, outpatient neurology F/U.    Call for questions, will see prn. Thanks.    Rigoberto Hameed M.D.

## 2021-08-19 NOTE — DISCHARGE PLACEMENT REQUEST
"Zachary Rios (86 y.o. Female)     Date of Birth Social Security Number Address Home Phone MRN    1934  [id number] [facility] [address] [phone] [id number]    Presybeterian Marital Status          Confucianist        Admission Date Admission Type Admitting Provider Attending Provider Department, Room/Bed    8/18/21 Emergency Jimena Babcock MD Salmon Echanique, Cristina D, MD [facility] MED SURG, 1409/1    Discharge Date Discharge Disposition Discharge Destination                       Attending Provider: Jimena Babcock MD    Allergies: Penicillins    Isolation: None   Infection: None   Code Status: CPR    Ht: 172 cm (67.72\")   Wt: 81 kg (178 lb 9.2 oz)    Admission Cmt: None   Principal Problem: None                Active Insurance as of 8/18/2021     Primary Coverage     Payor Plan Insurance Group Employer/Plan Group    HUMANA MEDICARE REPLACEMENT HUMANA MEDICARE REPLACEMENT [id number]     Payor Plan Address Payor Plan Phone Number Payor Plan Fax Number Effective Dates    [address] [address] [phone]  1/1/2013 - None Entered    [facility] [address]       Subscriber Name Subscriber Birth Date Member ID       ZACHARY RIOS 1934 [id number]                 Emergency Contacts      (Rel.) Home Phone Work Phone Mobile Phone    GabrielManuelito (Spouse) [phone] -- --    Apurva Zambrano (Daughter) -- -- [phone]    Rain Lee (Daughter) -- -- [phone]            "

## 2021-08-19 NOTE — ED NOTES
Daughter Apurva Best updated as to patient's condition and test status     Wen Meyer, JULIO  08/18/21 4987

## 2021-08-19 NOTE — DISCHARGE PLACEMENT REQUEST
"Zachary Rios (86 y.o. Female)     Date of Birth Social Security Number Address Home Phone MRN    1934  [id number] [facility] [address] [phone] [id number]    Mormonism Marital Status          Holiness        Admission Date Admission Type Admitting Provider Attending Provider Department, Room/Bed    8/18/21 Emergency Jimena Babcock MD Salmon Echanique, Cristina D, MD [facility] MED SURG, 1409/1    Discharge Date Discharge Disposition Discharge Destination                       Attending Provider: Jimena Babcock MD    Allergies: Penicillins    Isolation: None   Infection: None   Code Status: CPR    Ht: 172 cm (67.72\")   Wt: 81 kg (178 lb 9.2 oz)    Admission Cmt: None   Principal Problem: None                Active Insurance as of 8/18/2021     Primary Coverage     Payor Plan Insurance Group Employer/Plan Group    HUMANA MEDICARE REPLACEMENT HUMANA MEDICARE REPLACEMENT [id number]     Payor Plan Address Payor Plan Phone Number Payor Plan Fax Number Effective Dates    [address] [address] [phone]  1/1/2013 - None Entered    [facility] [id number]       Subscriber Name Subscriber Birth Date Member ID       ZACHARY RIOS 1934 [id number]                 Emergency Contacts      (Rel.) Home Phone Work Phone Mobile Phone    GabrielManuelito (Spouse) [phone] -- --    Apurva Zambrano (Daughter) -- -- [phone]    Rain Lee (Daughter) -- -- [phone]              "

## 2021-08-19 NOTE — CONSULTS
Stroke Consult Note    Patient Name: Magnolia Rios   MRN: [id number]  Age: 86 y.o.  Sex: female  : 1934    Primary Care Physician: Nathen Bailey MD  Referring Physician:  No ref. provider found    TIME STROKE TEAM CALLED:  2030 EST 21    TIME PATIENT SEEN: 1000 EST      Chief Complaint/Reason for Consultation: right sided weakness    Subjective .  HPI:   87 y/o female with DM, HLP, dementia, had a sudden onset on right sided arm and leg heaviness that mostly resolved by the time of arrival to ED at 2030, exam nonfocal, /92, NSR, , CT/CTA negative for acute changes, LVO. Patient received  mg, started on Plavix and high dose statin. She has been cleared for po diet, has gotten up with PT, uses a walker at baseline. Her BP has been running high up to 210/105. Family reported that she is close to baseline.    Last Known Normal Date/Time:  0929-3610 EST on 21    Review of Systems   Unable to perform ROS: Dementia        Temp:  [97.6 °F (36.4 °C)-98.6 °F (37 °C)] 98.1 °F (36.7 °C)  Heart Rate:  [67-87] 69  Resp:  [16-18] 18  BP: (173-210)/() 184/105        Objective   Neurological Exam  Mental Status  Alert.    Cranial Nerves  CN III, IV, VI: Extraocular movements intact bilaterally.    Reflexes  Deep tendon reflexes are 2+ and symmetric in all four extremities with downgoing toes bilaterally.      Physical Exam  Constitutional:       Appearance: She is well-developed.   Eyes:      Extraocular Movements: Extraocular movements intact.   Cardiovascular:      Rate and Rhythm: Normal rate and regular rhythm.   Pulmonary:      Effort: Pulmonary effort is normal.   Neurological:      Mental Status: She is alert.      Deep Tendon Reflexes: Reflexes are normal and symmetric.      Comments: Oriented to name and place, not month or year, speech clear, trace facial asymmetry, no droop, no limb drift, question on right foot drop.   Psychiatric:         Behavior: Behavior normal.          Thought Content: Thought content normal.         Acute Stroke Data    Alteplase (tPA) Inclusion / Exclusion Criteria    Time: 09:41 EDT  Person Administering Scale: Rigoberto Hameed MD    Inclusion Criteria  []   18 years of age or greater   []   Onset of symptoms < 4.5 hours before beginning treatment (stroke onset = time patient was last seen well or without symptoms).   []   Diagnosis of acute ischemic stroke causing measurable disabling deficit (Complete Hemianopia, Any Aphasia, Visual or Sensory Extinction, Any weakness limiting sustained effort against gravity)   []   Any remaining deficit considered potentially disabling in view of patient and practitioner   Exclusion criteria (Do not proceed with Alteplase if any are checked under exclusion criteria)  []   Onset unknown or GREATER than 4.5 hours   []   ICH on CT/MRI   []   CT demonstrates hypodensity representing acute or subacute infarct   []   Significant head trauma or prior stroke in the previous 3 months   []   Symptoms suggestive of subarachnoid hemorrhage   []   History of un-ruptured intracranial aneurysm GREATER than 10 mm   []   Recent intracranial or intraspinal surgery within the last 3 months   []   Arterial puncture at a non-compressible site in the previous 7 days   []   Active internal bleeding   []   Acute bleeding tendency   []   Platelet count LESS than 100,000 for known hematological diseases such as leukemia, thrombocytopenia or chronic cirrhosis   []   Current use of anticoagulant with INR GREATER than 1.7 or PT GREATER than 15 seconds, aPTT GREATER than 40 seconds   []   Heparin received within 48 hours, resulting in abnormally elevated aPTT GREATER than upper limit of normal   []   Current use of direct thrombin inhibitors or direct factor Xa inhibitors in the past 48 hours   []   Elevated blood pressure refractory to treatment (systolic GREATER than 185 mm/Hg or diastolic  GREATER than 110 mm/Hg   []   Suspected infective  endocarditis and aortic arch dissection   []   Current use of therapeutic treatment dose of low-molecular-weight heparin (LMWH) within the previous 24 hours   []   Structural GI malignancy or bleed   Relative exclusion for all patients  [x]   Only minor non-disabling symptoms   []   Pregnancy   []   Seizure at onset with postictal residual neurological impairments   []   Major surgery or previous trauma within past 14 days   []   History of previous spontaneous ICH, intracranial neoplasm, or AV malformation   []   Postpartum (within previous 14 days)   []   Recent GI or urinary tract hemorrhage (within previous 21 days)   []   Recent acute MI (within previous 3 months)   []   History of un-ruptured intracranial aneurysm LESS than 10 mm   []   History of ruptured intracranial aneurysm   []   Blood glucose LESS than 50 mg/dL (2.7 mmol/L)   []   Dural puncture within the last 7 days   []   Known GREATER than 10 cerebral microbleeds   Additional exclusions for patients with symptoms onset between 3 and 4.5 hours.  [x]   Age > 80.   []   On any anticoagulants regardless of INR  >>> Warfarin (Coumadin), Heparin, Enoxaparin (Lovenox), fondaparinux (Arixtra), bivalirudin (Angiomax), Argatroban, dabigatran (Pradaxa), rivaroxaban (Xarelto), or apixaban (Eliquis)   []   Severe stroke (NIHSS > 25).   []   History of BOTH diabetes and previous ischemic stroke.   []   The risks and benefits have been discussed with the patient or family related to the administration of IV Alteplase for stroke symptoms.   []   I have discussed and reviewed the patient's case and imaging with the attending prior to IV Alteplase.           Past Medical History:   Diagnosis Date   • Arthritis    • Depression    • Diabetes mellitus (CMS/HCC)    • Disease of thyroid gland    • Heart palpitations    • Hyperlipidemia    • Panic attack      Past Surgical History:   Procedure Laterality Date   • COLONOSCOPY     • HYSTERECTOMY       Family History   Problem  Relation Age of Onset   • No Known Problems Mother    • Seizures Father    • Breast cancer Neg Hx      Social History     Socioeconomic History   • Marital status:      Spouse name: Not on file   • Number of children: 4   • Years of education: Not on file   • Highest education level: Not on file   Tobacco Use   • Smoking status: Former Smoker     Types: Cigarettes     Quit date: 1989     Years since quittin.9   • Smokeless tobacco: Never Used   Vaping Use   • Vaping Use: Never used   Substance and Sexual Activity   • Alcohol use: Yes     Alcohol/week: 1.0 standard drinks     Types: 1 Cans of beer per week     Comment: occ   • Drug use: No   • Sexual activity: Yes     Partners: Male     Birth control/protection: Post-menopausal     Allergies   Allergen Reactions   • Penicillins      Prior to Admission medications    Medication Sig Start Date End Date Taking? Authorizing Provider   ALPRAZolam (XANAX) 0.5 MG tablet Take 1 tablet by mouth At Night As Needed for Anxiety. 21  Yes Nathen Bailey MD   ezetimibe (ZETIA) 10 MG tablet TAKE 1 TABLET DAILY 21  Yes Nathen Bailey MD   FLUoxetine (PROzac) 20 MG capsule Take 1 capsule by mouth Daily. 21  Yes Nathen Bailey MD   levothyroxine (Synthroid) 25 MCG tablet Take 1 tablet by mouth Daily. Indications: Underactive Thyroid 21  Yes Nathen Bailey MD   meclizine (ANTIVERT) 25 MG tablet Take 1 tablet by mouth 3 (Three) Times a Day As Needed for Dizziness. 11/3/20  Yes Nathen Bailey MD   memantine (NAMENDA) 10 MG tablet Take 1 tablet by mouth Every Morning. 21  Yes Nathen Bailey MD   metFORMIN ER (GLUCOPHAGE-XR) 500 MG 24 hr tablet Take 1 tablet by mouth Daily With Breakfast. 11/3/20  Yes Nathen Bailey MD   simvastatin (ZOCOR) 20 MG tablet TAKE 1 TABLET EVERY NIGHT 21  Yes Nathen Bailey MD   meloxicam (MOBIC) 15 MG tablet TAKE 1 TABLET DAILY FOR JOINT DAMAGE CAUSING PAIN AND LOSS OF FUNCTION  21   Nathen Bailey MD       [facility] Meds:  Scheduled- aspirin, 81 mg, Oral, Daily   Or  aspirin, 300 mg, Rectal, Daily  atorvastatin, 80 mg, Oral, Nightly  cholecalciferol, 2,000 Units, Oral, Daily  clopidogrel, 75 mg, Oral, Daily  FLUoxetine, 20 mg, Oral, Daily  heparin (porcine), 5,000 Units, Subcutaneous, Q8H  insulin aspart, 0-7 Units, Subcutaneous, TID AC  levothyroxine, 25 mcg, Oral, Q AM  memantine, 10 mg, Oral, Daily  multivitamin with minerals, 1 tablet, Oral, Daily  sodium chloride, 10 mL, Intravenous, Q12H      Infusions-     PRNs- dextrose  •  dextrose  •  glucagon (human recombinant)  •  labetalol  •  meclizine  •  sodium chloride  •  sodium chloride  •  sodium chloride    Functional Status Prior to Current Stroke/Jim Score:     NIH Stroke Scale  Time: 09:41 EDT  Person Administering Scale: Rigoberto Hameed MD    1a  Level of consciousness:0   1b. LOC questions: 1   1c. LOC commands:0   2.  Best Gaze:0   3.  Visual:0   4. Facial Palsy:1   5a.  Motor left arm:0   5b.  Motor right arm:0   6a. motor left le   6b  Motor right le   7. Limb Ataxia:0   8.  Sensory:0   9. Best Language: 0   10. Dysarthria:0   11. Extinction and Inattention:0    Total:3         Results Reviewed:  I have personally reviewed current lab, radiology, and data and agree with results.  Adult Transthoracic Echo Complete W/ Cont if Necessary Per Protocol (With Agitated Saline)    Urinalysis, Microscopic Only - Urine, Clean Catch  Order: [id number] - Reflex for Order [id number]  Status:  Final result   Visible to patient:  No (not released) Next appt:  Today at 11:30 AM in Radiology (LAG MRI)  Specimen Information: Urine, Clean Catch         0 Result Notes  Component   Ref Range & Units 00:30 3 yr ago 4 yr ago   RBC, UA   None Seen /HPF 3-5Abnormal   Comment R, CM  None Seen    WBC, UA   None Seen /HPF 13-20Abnormal   3-5Abnormal  R  6-12Abnormal     Bacteria, UA   None Seen /HPF 4+Abnormal   TraceAbnormal  R   TraceAbnormal     Squamous Epithelial Cells, UA   None Seen, 0-2 /HPF 0-2   0-2    Hyaline Casts, UA   None Seen /LPF None Seen   None Seen    Methodology  Manual Light Microscopy                EKG time/Interp time: 2031/2040  Rhythm/Rate: Sinus rhythm rate of 84  P waves and KY: Normal P waves with normal KY interval  QRS, axis: Normal QRS duration with slight left axis deviation  ST and T waves: No ST elevations or depressions  Result Date: 8/19/2021  · Estimated left ventricular EF = 60% Left ventricular systolic function is normal. · Saline test results are negative. · Left ventricular diastolic function was normal.      XR Chest 1 View    Result Date: 8/18/2021  CR Chest 1 Vw INDICATION: Right-sided weakness, dizziness COMPARISON:  None available. FINDINGS: Single portable AP view(s) of the chest.  The heart and mediastinal contours are normal. The lungs are clear apart from chronic changes. No pneumothorax or pleural effusion.     No acute cardiopulmonary findings. Signer Name: Karen Herr MD  Signed: 8/18/2021 8:54 PM  Workstation Name: [id number]  [facility]    CT Head Without Contrast Stroke Protocol    Result Date: 8/18/2021  CT Head WO Code Stroke HISTORY: Right sided weakness since approximately 5:00 PM today. TECHNIQUE: Axial unenhanced head CT. Radiation dose reduction techniques included automated exposure control or exposure modulation based on body size. Count of known CT and cardiac nuc med studies performed in previous 12 months: 0. Time of scan: 8:17 PM COMPARISON: June 13, 2019 FINDINGS: The ventricles are enlarged. The degree of ventricular enlargement is out of proportion to the degree of cortical atrophy. This finding can be seen in normal pressure hydrocephalus. These findings were present on the prior study of June 13, 2019 and appear unchanged. No parenchymal or subarachnoid hemorrhage. No extra-axial fluid collection. No significant shift of midline structures.  There are periventricular hypodensities which are nonspecific but likely represent microvascular disease. Mastoid air cells are clear. Paranasal sinuses are clear. No acute orbital findings.     1.  No acute intracranial findings. 2.  There is ventricular enlargement which appears out of proportion to the degree of cortical atrophy. This finding can be seen in normal pressure hydrocephalus. Findings are similar to the prior study of June 13, 2019. Signer Name: Ayden Fonseca MD  Signed: 8/18/2021 8:31 PM  Workstation Name: [email or web address]  [facility]    CTA Head, CTA Neck     HISTORY:  Right-sided weakness beginning around 5:00 PM yesterday with generalized headache this morning and confusion     TECHNIQUE:  CT angiogram of the head and neck with IV contrast. 3-D reconstructions were obtained and reviewed. Evaluation for a significant carotid arterial stenosis is based on NASCET criteria. Radiation dose reduction techniques included automated exposure  control. Radiation audit for known CT and nuclear cardiology exams in the last 12 months: 1.     COMPARISON:  CT head 8/18/2021     CTA NECK:     Typical 3 vessel configuration of the aortic arch. No proximal great vessel stenosis.     The vertebral arteries are codominant and patent throughout. Minimal luminal irregularities at the distal right V3 and V4 segment as well as the left V3/V4 junction likely reflect atherosclerotic change but without flow-limiting stenosis.     Both common carotid arteries are widely patent. There is mild atherosclerosis at both carotid bifurcations but well less than 25% narrowing of both proximal internal carotid arteries as measured by NASCET criteria. Degree of atherosclerosis is slightly  worse on the right.     CTA HEAD:     Posterior circulation demonstrates a fetal configuration of the right PCA. There are mild luminal irregularities likely reflecting atherosclerotic change but no focal arterial vascular  occlusion or high-grade stenosis is identified.     Anterior circulation demonstrates mild atherosclerosis at the level both carotid siphons without flow-limiting stenosis. Anterior cerebral arteries are widely patent bilaterally. Mild luminal irregularities within the middle cerebral arteries bilaterally  likely reflect atherosclerotic changes but there is no high-grade narrowing or focal arterial vascular occlusion. No saccular aneurysm is identified. No intracranial arterial enhancing lesion is identified. Enlarged ventricles are again identified.     IMPRESSION:  No intracranial arterial vascular occlusion or high-grade stenosis.  No flow-limiting stenosis in the arterial vasculature of the neck.     Signer Name: UMA DAO MD   Signed: 8/19/2021 9:39 AM   Workstation Name: [id number]    [facility]   Imaging              Assessment/Plan:  TIA vs a small stroke, no TPA on mostly resolved symptoms. No LVO. Baseline dementia. Suspect small vessel thrombosis 22 HTN. Question of UTI.   - Observation on telemetry.   - Stroke order set.   - Continue aspirin, Plavix, statin.   - MRI brain.   - BP<130/80.   - ST, PT, OT.      Will follow, thanks.        Rigoberto Hameed MD  August 19, 2021  09:41 EDT    Verbal consent taken.  Patient agreeable to be seen via telemedicine.    This was an audio and video enabled telemedicine encounter.

## 2021-08-19 NOTE — NURSING NOTE
Report called to Leydi at [facility].  [facility] plans to take patient this evening, but must wait for insurance pre-certification prior to patient coming to facility.

## 2021-08-19 NOTE — CASE MANAGEMENT/SOCIAL WORK
Continued Stay Note  ALEX Tian     Patient Name: Magnolia Rios  MRN: [id number]  Today's Date: 8/19/2021    Admit Date: 8/18/2021    Discharge Plan     Row Name 08/19/21 1311       Plan    Plan  plan home w /family vs STR    Plan Comments  Call to [place] to follow up on referral made earlier. [facility] is not available and no other  available at this time. Spoke with [facility], they have no beds available. Spoke with [facility] - in basket referral sent. CM will continue to follow.    Row Name 08/19/21 1147       Plan    Plan  plan home with /family vs STR    Plan Comments  Spoke with patients daughter, Apurva Zambrano, who states patient is agreeable to STR. She has no preference of facility but would like as close to [facility] as possible. Discussed bed availability is limited, she is agreeable to referrals outside of [facility]. as well. Spoke with [facility] spain return call for availability. No beds available at [facility] or [facility]. LVM & in basket referral to [facility]. CM will continue to follow.        Discharge Codes    No documentation.             Stefan Ortiz RN

## 2021-08-19 NOTE — DISCHARGE SUMMARY
"Magnolia Rios  1934  [id number]    Hospitalists Discharge Summary    Date of Admission: 8/18/2021  Date of Discharge:  8/19/2021    History of Present Illness from [facility] on admit:   \"This is an 87 y/o female with hx of dementia, DM, HTN, HLD who was brought in to ED by family.   Per patient she feels \"fine\" she has pain on her right knee and has frequent falls.   Per daughter at bedside, who is providing history, patient was last seen by her on Saturday and was in her normal state of health. Patient has had right knee pain and weakness that has been progressively worsening within the last 3-4 years and has been told she has OA.   Patient today while trying to get up from a chair noted to be weak and needing total assistance to get into a wheelchair and unable to move either leg and noticed to have jerky movements of right hand at rest. Also noted to have RUL weakness and , dropping food and unable to hold a spoon while eating dinner. No noted slurring of speech.   Per daughter patient also unable to change diaper pants frequently and concerned for UTI.\"  Primary Discharge diagnoses:  Acute lacunar stroke left external capsule   Ruled out UTI   [facility] Discharge Diagnoses:    Uncontrolled hypertension: New diagnosis  DM2 with hyperglycemia   CKD stage 3   Hypothyroidism   HLD   Dementia    Hospital Course Summary:   The patient was admitted, stroke workup was completed with findings as above under consultation with neuro stroke team. Suspected UTI was ruled out with catheterized specimen. The patient was started on high dose statin and DAPT with recommendation for rehab and f/u neurology outpatient. Her BP was much improved on lisinopril and amlodipine started this admission. Further BP monitoring should continue to determine need to alter regimen. A1C near goal, resume home metformin tomorrow. Zio patch was not placed at this admission and will need to f/u pcp and neuro to determine need to place " patch in future. F/U Nathen Bailey MD 1 week after d/c from rehab facility. Going to [facility].     PCP  Patient Care Team:  Nathen Bailey MD as PCP - General (Family Medicine)  Enrico Palacios MD as Surgeon (Orthopedic Surgery)    Consults:   Consults     Date and Time Order Name Status Description    8/19/2021  3:27 AM Inpatient Neurology Consult Stroke Completed     8/18/2021  8:14 PM Inpatient Neurology Consult Stroke Completed     8/18/2021  8:14 PM Inpatient Neurology Consult Stroke Completed           Operations and Procedures Performed:     Adult Transthoracic Echo Complete W/ Cont if Necessary Per Protocol (With Agitated Saline)    Result Date: 8/19/2021  Narrative: · Estimated left ventricular EF = 60% Left ventricular systolic function is normal. · Saline test results are negative. · Left ventricular diastolic function was normal.      CT Angiogram Neck    Result Date: 8/19/2021  Narrative: CTA Head, CTA Neck HISTORY: Right-sided weakness beginning around 5:00 PM yesterday with generalized headache this morning and confusion TECHNIQUE: CT angiogram of the head and neck with IV contrast. 3-D reconstructions were obtained and reviewed. Evaluation for a significant carotid arterial stenosis is based on NASCET criteria. Radiation dose reduction techniques included automated exposure control. Radiation audit for known CT and nuclear cardiology exams in the last 12 months: 1. COMPARISON: CT head 8/18/2021 CTA NECK: Typical 3 vessel configuration of the aortic arch. No proximal great vessel stenosis. The vertebral arteries are codominant and patent throughout. Minimal luminal irregularities at the distal right V3 and V4 segment as well as the left V3/V4 junction likely reflect atherosclerotic change but without flow-limiting stenosis. Both common carotid arteries are widely patent. There is mild atherosclerosis at both carotid bifurcations but well less than 25% narrowing of both  proximal internal carotid arteries as measured by NASCET criteria. Degree of atherosclerosis is slightly worse on the right. CTA HEAD: Posterior circulation demonstrates a fetal configuration of the right PCA. There are mild luminal irregularities likely reflecting atherosclerotic change but no focal arterial vascular occlusion or high-grade stenosis is identified. Anterior circulation demonstrates mild atherosclerosis at the level both carotid siphons without flow-limiting stenosis. Anterior cerebral arteries are widely patent bilaterally. Mild luminal irregularities within the middle cerebral arteries bilaterally likely reflect atherosclerotic changes but there is no high-grade narrowing or focal arterial vascular occlusion. No saccular aneurysm is identified. No intracranial arterial enhancing lesion is identified. Enlarged ventricles are again identified.     Impression: No intracranial arterial vascular occlusion or high-grade stenosis. No flow-limiting stenosis in the arterial vasculature of the neck. Signer Name: UMA DAO MD  Signed: 8/19/2021 9:39 AM  Workstation Name: [facility]  [facility]    XR Chest 1 View    Result Date: 8/18/2021  Narrative: CR Chest 1 Vw INDICATION: Right-sided weakness, dizziness COMPARISON:  None available. FINDINGS: Single portable AP view(s) of the chest.  The heart and mediastinal contours are normal. The lungs are clear apart from chronic changes. No pneumothorax or pleural effusion.     Impression: No acute cardiopulmonary findings. Signer Name: Karen Herr MD  Signed: 8/18/2021 8:54 PM  Workstation Name: [id number]  Radiology [facility]    CT Angiogram Head    Result Date: 8/19/2021  Narrative: CTA Head, CTA Neck HISTORY: Right-sided weakness beginning around 5:00 PM yesterday with generalized headache this morning and confusion TECHNIQUE: CT angiogram of the head and neck with IV contrast. 3-D reconstructions were obtained and  reviewed. Evaluation for a significant carotid arterial stenosis is based on NASCET criteria. Radiation dose reduction techniques included automated exposure control. Radiation audit for known CT and nuclear cardiology exams in the last 12 months: 1. COMPARISON: CT head 8/18/2021 CTA NECK: Typical 3 vessel configuration of the aortic arch. No proximal great vessel stenosis. The vertebral arteries are codominant and patent throughout. Minimal luminal irregularities at the distal right V3 and V4 segment as well as the left V3/V4 junction likely reflect atherosclerotic change but without flow-limiting stenosis. Both common carotid arteries are widely patent. There is mild atherosclerosis at both carotid bifurcations but well less than 25% narrowing of both proximal internal carotid arteries as measured by NASCET criteria. Degree of atherosclerosis is slightly worse on the right. CTA HEAD: Posterior circulation demonstrates a fetal configuration of the right PCA. There are mild luminal irregularities likely reflecting atherosclerotic change but no focal arterial vascular occlusion or high-grade stenosis is identified. Anterior circulation demonstrates mild atherosclerosis at the level both carotid siphons without flow-limiting stenosis. Anterior cerebral arteries are widely patent bilaterally. Mild luminal irregularities within the middle cerebral arteries bilaterally likely reflect atherosclerotic changes but there is no high-grade narrowing or focal arterial vascular occlusion. No saccular aneurysm is identified. No intracranial arterial enhancing lesion is identified. Enlarged ventricles are again identified.     Impression: No intracranial arterial vascular occlusion or high-grade stenosis. No flow-limiting stenosis in the arterial vasculature of the neck. Signer Name: UMA DAO MD  Signed: 8/19/2021 9:39 AM  Workstation Name: [id number]  [facility]    CT Head Without Contrast Stroke  Protocol    Result Date: 8/18/2021  Narrative: CT Head WO Code Stroke HISTORY: Right sided weakness since approximately 5:00 PM today. TECHNIQUE: Axial unenhanced head CT. Radiation dose reduction techniques included automated exposure control or exposure modulation based on body size. Count of known CT and cardiac nuc med studies performed in previous 12 months: 0. Time of scan: 8:17 PM COMPARISON: June 13, 2019 FINDINGS: The ventricles are enlarged. The degree of ventricular enlargement is out of proportion to the degree of cortical atrophy. This finding can be seen in normal pressure hydrocephalus. These findings were present on the prior study of June 13, 2019 and appear unchanged. No parenchymal or subarachnoid hemorrhage. No extra-axial fluid collection. No significant shift of midline structures. There are periventricular hypodensities which are nonspecific but likely represent microvascular disease. Mastoid air cells are clear. Paranasal sinuses are clear. No acute orbital findings.     Impression: 1.  No acute intracranial findings. 2.  There is ventricular enlargement which appears out of proportion to the degree of cortical atrophy. This finding can be seen in normal pressure hydrocephalus. Findings are similar to the prior study of June 13, 2019. Signer Name: Ayden Fonseca MD  Signed: 8/18/2021 8:31 PM  Workstation [facility]: [facility]  [facility]    Allergies:  is allergic to penicillins.    Jeronimo  Xaolgax 7/2021 per report, reviewed by me    Discharge Medications:     Discharge Medications      New Medications      Instructions Start Date   amLODIPine 5 MG tablet  Commonly known as: NORVASC   5 mg, Oral, Every 24 Hours Scheduled   Start Date: August 20, 2021     aspirin 81 MG EC tablet   81 mg, Oral, Daily      atorvastatin 80 MG tablet  Commonly known as: LIPITOR  Replaces: simvastatin 20 MG tablet   80 mg, Oral, Nightly      clopidogrel 75 MG tablet  Commonly known as: PLAVIX    75 mg, Oral, Daily   Start Date: August 20, 2021     lisinopril 10 MG tablet  Commonly known as: PRINIVIL,ZESTRIL   10 mg, Oral, Every 24 Hours Scheduled   Start Date: August 20, 2021        Continue These Medications      Instructions Start Date   ezetimibe 10 MG tablet  Commonly known as: ZETIA   TAKE 1 TABLET DAILY      FLUoxetine 20 MG capsule  Commonly known as: PROzac   20 mg, Oral, Daily      levothyroxine 25 MCG tablet  Commonly known as: Synthroid   25 mcg, Oral, Daily      metFORMIN  MG 24 hr tablet  Commonly known as: GLUCOPHAGE-XR   500 mg, Oral, Daily With Breakfast         Stop These Medications    ALPRAZolam 0.5 MG tablet  Commonly known as: XANAX     meclizine 25 MG tablet  Commonly known as: ANTIVERT     meloxicam 15 MG tablet  Commonly known as: MOBIC     simvastatin 20 MG tablet  Commonly known as: ZOCOR  Replaced by: atorvastatin 80 MG tablet        ASK your doctor about these medications      Instructions Start Date   memantine 10 MG tablet  Commonly known as: NAMENDA   10 mg, Oral, Every Morning             Last Lab Results:   Lab Results (most recent)     Procedure Component Value Units Date/Time    POC Glucose Once [[id number]]  (Abnormal) Collected: 08/19/21 1649    Specimen: Blood Updated: 08/19/21 1659     Glucose 193 mg/dL      Comment: Meter: [id number] : [id number] JAEGER       Urinalysis, Microscopic Only - Urine, Catheter In/Out [[id number]]  (Abnormal) Collected: 08/19/21 1223    Specimen: Urine, Catheter In/Out Updated: 08/19/21 1245     RBC, UA 0-2 /HPF      WBC, UA 0-2 /HPF      Bacteria, UA None Seen /HPF      Squamous Epithelial Cells, UA None Seen /HPF      Hyaline Casts, UA None Seen /LPF      Methodology Manual Light Microscopy    Urinalysis With Culture If Indicated - Urine, Catheter In/Out [[id number]]  (Abnormal) Collected: 08/19/21 1223    Specimen: Urine, Catheter In/Out Updated: 08/19/21 1244     Color, UA Yellow     Appearance, UA Clear     pH, UA 7.0      Specific Gravity, UA 1.015     Glucose, UA Negative     Ketones, UA Negative     Bilirubin, UA Negative     Blood, UA Trace     Protein, UA Negative     Leuk Esterase, UA Trace     Nitrite, UA Negative     Urobilinogen, UA 0.2 E.U./dL    POC Glucose Once [[id number]]  (Abnormal) Collected: 08/19/21 1136    Specimen: Blood Updated: 08/19/21 1149     Glucose 232 mg/dL      Comment: Meter: [id number] : [id number] Jose JAEGER       Folate [[id number]]  (Normal) Collected: 08/19/21 0413    Specimen: Blood Updated: 08/19/21 1058     Folate 8.53 ng/mL     Narrative:      Results may be falsely increased if patient taking Biotin.      Vitamin B12 [[id number]]  (Normal) Collected: 08/19/21 0413    Specimen: Blood Updated: 08/19/21 1058     Vitamin B-12 517 pg/mL     Narrative:      Results may be falsely increased if patient taking Biotin.      C-reactive Protein [[id number]]  (Abnormal) Collected: 08/19/21 0413    Specimen: Blood Updated: 08/19/21 1042     C-Reactive Protein 0.75 mg/dL     Hemoglobin A1c [[id number]]  (Abnormal) Collected: 08/19/21 0413    Specimen: Blood Updated: 08/19/21 1036     Hemoglobin A1C 7.10 %     Narrative:      Hemoglobin A1C Ranges:    Increased Risk for Diabetes  5.7% to 6.4%  Diabetes                     >= 6.5%  Diabetic Goal                < 7.0%    Urine Culture - Urine, Urine, Clean Catch [[id number]] Collected: 08/19/21 0030    Specimen: Urine, Clean Catch Updated: 08/19/21 0843    Lipid Panel [[id number]] Collected: 08/19/21 0413    Specimen: Blood Updated: 08/19/21 0546     Total Cholesterol 145 mg/dL      Triglycerides 127 mg/dL      HDL Cholesterol 48 mg/dL      LDL Cholesterol  74 mg/dL      VLDL Cholesterol 23 mg/dL      LDL/HDL Ratio 1.49    Narrative:      Cholesterol Reference Ranges  (U.S. Department of Health and Human Services ATP III Classifications)    Desirable          <200 mg/dL  Borderline High    200-239 mg/dL  High Risk          >240 mg/dL      Triglyceride  Reference Ranges  (U.S. Department of Health and Human Services ATP III Classifications)    Normal           <150 mg/dL  Borderline High  150-199 mg/dL  High             200-499 mg/dL  Very High        >500 mg/dL    HDL Reference Ranges  (U.S. Department of Health and Human Services ATP III Classifcations)    Low     <40 mg/dl (major risk factor for CHD)  High    >60 mg/dl ('negative' risk factor for CHD)        LDL Reference Ranges  (U.S. Department of Health and Human Services ATP III Classifcations)    Optimal          <100 mg/dL  Near Optimal     100-129 mg/dL  Borderline High  130-159 mg/dL  High             160-189 mg/dL  Very High        >189 mg/dL    Basic Metabolic Panel [[id number]]  (Abnormal) Collected: 08/19/21 0413    Specimen: Blood Updated: 08/19/21 0546     Glucose 130 mg/dL      BUN 23 mg/dL      Creatinine 1.42 mg/dL      Sodium 140 mmol/L      Potassium 3.8 mmol/L      Chloride 106 mmol/L      CO2 25.5 mmol/L      Calcium 9.5 mg/dL      eGFR Non African Amer 35 mL/min/1.73      BUN/Creatinine Ratio 16.2     Anion Gap 8.5 mmol/L     Narrative:      GFR Normal >60  Chronic Kidney Disease <60  Kidney Failure <15      TSH [[id number]]  (Normal) Collected: 08/19/21 0413    Specimen: Blood Updated: 08/19/21 0536     TSH 3.900 uIU/mL     CBC (No Diff) [[id number]]  (Abnormal) Collected: 08/19/21 0413    Specimen: Blood Updated: 08/19/21 0512     WBC 9.55 10*3/mm3      RBC 3.99 10*6/mm3      Hemoglobin 11.3 g/dL      Hematocrit 35.7 %      MCV 89.5 fL      MCH 28.3 pg      MCHC 31.7 g/dL      RDW 12.9 %      RDW-SD 42.5 fl      MPV 10.9 fL      Platelets 246 10*3/mm3     Urinalysis, Microscopic Only - Urine, Clean Catch [[id number]]  (Abnormal) Collected: 08/19/21 0030    Specimen: Urine, Clean Catch Updated: 08/19/21 0048     RBC, UA 3-5 /HPF      WBC, UA 13-20 /HPF      Bacteria, UA 4+ /HPF      Squamous Epithelial Cells, UA 0-2 /HPF      Hyaline Casts, UA None Seen /LPF      Methodology Manual Light  Microscopy    Urinalysis With Microscopic If Indicated (No Culture) - Urine, Clean Catch [[id number]]  (Abnormal) Collected: 08/19/21 0030    Specimen: Urine, Clean Catch Updated: 08/19/21 0038     Color, UA Straw     Appearance, UA Slightly Cloudy     pH, UA 6.5     Specific Gravity, UA <=1.005     Glucose, UA Negative     Ketones, UA Negative     Bilirubin, UA Negative     Blood, UA Trace     Protein, UA Negative     Leuk Esterase, UA Small (1+)     Nitrite, UA Positive     Urobilinogen, UA 0.2 E.U./dL    Phosphorus [[id number]]  (Normal) Collected: 08/18/21 2032    Specimen: Blood Updated: 08/18/21 2344     Phosphorus 3.0 mg/dL     Magnesium [[id number]]  (Normal) Collected: 08/18/21 2032    Specimen: Blood Updated: 08/18/21 2344     Magnesium 2.2 mg/dL     Sedimentation Rate [[id number]]  (Normal) Collected: 08/18/21 2032    Specimen: Blood Updated: 08/18/21 2338     Sed Rate 21 mm/hr     [place] Draw [[id number]] Collected: 08/18/21 2032    Specimen: Blood Updated: 08/18/21 2145    Narrative:      The following orders were created for panel order [place] Draw.  Procedure                               Abnormality         Status                     ---------                               -----------         ------                     Green Top (Gel)[[id number]]                                  Final result               Lavender Top[[id number]]                                     Final result               Gold Top - SST[[id number]]                                                                Please view results for these tests on the individual orders.    Lavender Top [[id number]] Collected: 08/18/21 2032    Specimen: Blood Updated: 08/18/21 2145     Extra Tube hold for add-on     Comment: Auto resulted       Green Top (Gel) [[id number]] Collected: 08/18/21 2032    Specimen: Blood Updated: 08/18/21 2145     Extra Tube Hold for add-ons.     Comment: Auto resulted.       COVID PRE-OP / PRE-PROCEDURE SCREENING ORDER (NO  ISOLATION) - Swab, Nasal Cavity [[id number]]  (Normal) Collected: 08/18/21 2049    Specimen: Swab from Nasal Cavity Updated: 08/18/21 2131    Narrative:      The following orders were created for panel order COVID PRE-OP / PRE-PROCEDURE SCREENING ORDER (NO ISOLATION) - Swab, Nasal Cavity.  Procedure                               Abnormality         Status                     ---------                               -----------         ------                     COVID-19,[facility] [employer] [facility]...[[id number]]  Normal              Final result                 Please view results for these tests on the individual orders.    COVID-19,[facility] [employer] IN-HOUSE,Nasal Swab No Transport Media 3-4 HR TAT - Swab, Nasal Cavity [[id number]]  (Normal) Collected: 08/18/21 2049    Specimen: Swab from Nasal Cavity Updated: 08/18/21 2131     COVID19 Not Detected    Narrative:      Fact sheet for providers: https://www.fda.gov/media/433401/download     Fact sheet for patients: https://www.fda.gov/media/608832/download    Test performed by PCR.    Consider negative results in combination with clinical observations, patient history, and epidemiological information.    Troponin [[id number]]  (Normal) Collected: 08/18/21 2032    Specimen: Blood Updated: 08/18/21 2101     Troponin T <0.010 ng/mL     Narrative:      Troponin T Reference Range:  <= 0.03 ng/mL-   Negative for AMI  >0.03 ng/mL-     Abnormal for myocardial necrosis.  Clinicians would have to utilize clinical acumen, EKG, Troponin and serial changes to determine if it is an Acute Myocardial Infarction or myocardial injury due to an underlying chronic condition.       Results may be falsely decreased if patient taking Biotin.      Comprehensive Metabolic Panel [[id number]]  (Abnormal) Collected: 08/18/21 2032    Specimen: Blood Updated: 08/18/21 2059     Glucose 190 mg/dL      BUN 28 mg/dL      Creatinine 1.64 mg/dL      Sodium 136 mmol/L      Potassium 4.3 mmol/L      Chloride 102 mmol/L      CO2  25.0 mmol/L      Calcium 9.4 mg/dL      Total Protein 6.4 g/dL      Albumin 3.80 g/dL      ALT (SGPT) 9 U/L      AST (SGOT) 13 U/L      Alkaline Phosphatase 102 U/L      Total Bilirubin 0.3 mg/dL      eGFR Non African Amer 30 mL/min/1.73      Globulin 2.6 gm/dL      A/G Ratio 1.5 g/dL      BUN/Creatinine Ratio 17.1     Anion Gap 9.0 mmol/L     Narrative:      GFR Normal >60  Chronic Kidney Disease <60  Kidney Failure <15      Protime-INR [[id number]]  (Normal) Collected: 08/18/21 2032    Specimen: Blood Updated: 08/18/21 2053     Protime 13.8 Seconds      INR 1.06    Narrative:      Therapeutic Ranges for INR: 2.0-3.0 (PT 20-30)                              2.5-3.5 (PT 25-34)    aPTT [[id number]]  (Normal) Collected: 08/18/21 2032    Specimen: Blood Updated: 08/18/21 2053     PTT 28.6 seconds     Narrative:      PTT = The equivalent PTT values for the therapeutic range of heparin levels at 0.1 to 0.7 U/ml are 53 to 110 seconds.      CBC & Differential [[id number]]  (Abnormal) Collected: 08/18/21 2032    Specimen: Blood Updated: 08/18/21 2040    Narrative:      The following orders were created for panel order CBC & Differential.  Procedure                               Abnormality         Status                     ---------                               -----------         ------                     CBC Auto Differential[[id number]]        Abnormal            Final result                 Please view results for these tests on the individual orders.    CBC Auto Differential [[id number]]  (Abnormal) Collected: 08/18/21 2032    Specimen: Blood Updated: 08/18/21 2040     WBC 9.77 10*3/mm3      RBC 4.15 10*6/mm3      Hemoglobin 12.0 g/dL      Hematocrit 37.1 %      MCV 89.4 fL      MCH 28.9 pg      MCHC 32.3 g/dL      RDW 13.1 %      RDW-SD 42.5 fl      MPV 10.7 fL      Platelets 248 10*3/mm3      Neutrophil % 62.4 %      Lymphocyte % 25.9 %      Monocyte % 9.8 %      Eosinophil % 1.1 %      Basophil % 0.5 %      Immature  Grans % 0.3 %      Neutrophils, Absolute 6.09 10*3/mm3      Lymphocytes, Absolute 2.53 10*3/mm3      Monocytes, Absolute 0.96 10*3/mm3      Eosinophils, Absolute 0.11 10*3/mm3      Basophils, Absolute 0.05 10*3/mm3      Immature Grans, Absolute 0.03 10*3/mm3      nRBC 0.0 /100 WBC         Imaging Results (Most Recent)     Procedure Component Value Units Date/Time    MRI Brain Without Contrast [[id number]] Resulted: 08/19/21 1402     Updated: 08/19/21 1431    CT Angiogram Head [[id number]] Collected: 08/19/21 0939     Updated: 08/19/21 0942    Narrative:      CTA Head, CTA Neck    HISTORY:  Right-sided weakness beginning around 5:00 PM yesterday with generalized headache this morning and confusion    TECHNIQUE:  CT angiogram of the head and neck with IV contrast. 3-D reconstructions were obtained and reviewed. Evaluation for a significant carotid arterial stenosis is based on NASCET criteria. Radiation dose reduction techniques included automated exposure  control. Radiation audit for known CT and nuclear cardiology exams in the last 12 months: 1.    COMPARISON:  CT head 8/18/2021    CTA NECK:    Typical 3 vessel configuration of the aortic arch. No proximal great vessel stenosis.    The vertebral arteries are codominant and patent throughout. Minimal luminal irregularities at the distal right V3 and V4 segment as well as the left V3/V4 junction likely reflect atherosclerotic change but without flow-limiting stenosis.    Both common carotid arteries are widely patent. There is mild atherosclerosis at both carotid bifurcations but well less than 25% narrowing of both proximal internal carotid arteries as measured by NASCET criteria. Degree of atherosclerosis is slightly  worse on the right.    CTA HEAD:    Posterior circulation demonstrates a fetal configuration of the right PCA. There are mild luminal irregularities likely reflecting atherosclerotic change but no focal arterial vascular occlusion or high-grade stenosis  is identified.    Anterior circulation demonstrates mild atherosclerosis at the level both carotid siphons without flow-limiting stenosis. Anterior cerebral arteries are widely patent bilaterally. Mild luminal irregularities within the middle cerebral arteries bilaterally  likely reflect atherosclerotic changes but there is no high-grade narrowing or focal arterial vascular occlusion. No saccular aneurysm is identified. No intracranial arterial enhancing lesion is identified. Enlarged ventricles are again identified.      Impression:      No intracranial arterial vascular occlusion or high-grade stenosis.  No flow-limiting stenosis in the arterial vasculature of the neck.    Signer Name: UMA DAO MD   Signed: 8/19/2021 9:39 AM   Workstation Name: [id number]    [facility]    CT Angiogram Neck [[id number]] Collected: 08/19/21 0939     Updated: 08/19/21 0942    Narrative:      CTA Head, CTA Neck    HISTORY:  Right-sided weakness beginning around 5:00 PM yesterday with generalized headache this morning and confusion    TECHNIQUE:  CT angiogram of the head and neck with IV contrast. 3-D reconstructions were obtained and reviewed. Evaluation for a significant carotid arterial stenosis is based on NASCET criteria. Radiation dose reduction techniques included automated exposure  control. Radiation audit for known CT and nuclear cardiology exams in the last 12 months: 1.    COMPARISON:  CT head 8/18/2021    CTA NECK:    Typical 3 vessel configuration of the aortic arch. No proximal great vessel stenosis.    The vertebral arteries are codominant and patent throughout. Minimal luminal irregularities at the distal right V3 and V4 segment as well as the left V3/V4 junction likely reflect atherosclerotic change but without flow-limiting stenosis.    Both common carotid arteries are widely patent. There is mild atherosclerosis at both carotid bifurcations but well less than 25% narrowing of both  proximal internal carotid arteries as measured by NASCET criteria. Degree of atherosclerosis is slightly  worse on the right.    CTA HEAD:    Posterior circulation demonstrates a fetal configuration of the right PCA. There are mild luminal irregularities likely reflecting atherosclerotic change but no focal arterial vascular occlusion or high-grade stenosis is identified.    Anterior circulation demonstrates mild atherosclerosis at the level both carotid siphons without flow-limiting stenosis. Anterior cerebral arteries are widely patent bilaterally. Mild luminal irregularities within the middle cerebral arteries bilaterally  likely reflect atherosclerotic changes but there is no high-grade narrowing or focal arterial vascular occlusion. No saccular aneurysm is identified. No intracranial arterial enhancing lesion is identified. Enlarged ventricles are again identified.      Impression:      No intracranial arterial vascular occlusion or high-grade stenosis.  No flow-limiting stenosis in the arterial vasculature of the neck.    Signer Name: UMA DAO MD   Signed: 8/19/2021 9:39 AM   Work[facility]: [facility]    [facility]    XR Chest 1 View [[id number]] Collected: 08/18/21 2054     Updated: 08/18/21 2056    Narrative:      CR Chest 1 Vw    INDICATION:   Right-sided weakness, dizziness     COMPARISON:    None available.    FINDINGS:  Single portable AP view(s) of the chest.  The heart and mediastinal contours are normal. The lungs are clear apart from chronic changes. No pneumothorax or pleural effusion.      Impression:      No acute cardiopulmonary findings.    Signer Name: Karen Herr MD   Signed: 8/18/2021 8:54 PM   Workstation Name: [id number]    [facility]    CT Head Without Contrast Stroke Protocol [[id number]] Collected: 08/18/21 2031     Updated: 08/18/21 2033    Narrative:      CT Head WO Code Stroke    HISTORY:   Right sided weakness since approximately  5:00 PM today.    TECHNIQUE:   Axial unenhanced head CT. Radiation dose reduction techniques included automated exposure control or exposure modulation based on body size. Count of known CT and cardiac nuc med studies performed in previous 12 months: 0.     Time of scan: 8:17 PM    COMPARISON:   June 13, 2019    FINDINGS:   The ventricles are enlarged. The degree of ventricular enlargement is out of proportion to the degree of cortical atrophy. This finding can be seen in normal pressure hydrocephalus. These findings were present on the prior study of June 13, 2019 and  appear unchanged.    No parenchymal or subarachnoid hemorrhage. No extra-axial fluid collection. No significant shift of midline structures. There are periventricular hypodensities which are nonspecific but likely represent microvascular disease.    Mastoid air cells are clear. Paranasal sinuses are clear. No acute orbital findings.      Impression:      1.  No acute intracranial findings.    2.  There is ventricular enlargement which appears out of proportion to the degree of cortical atrophy. This finding can be seen in normal pressure hydrocephalus. Findings are similar to the prior study of June 13, 2019.          Signer Name: Ayden Fonseca MD   Signed: 8/18/2021 8:31 PM   Workstation Name: [id number]    [facility]          PROCEDURES      Condition on Discharge:  stable    Physical Exam at Discharge  Vital Signs  Temp:  [97.6 °F (36.4 °C)-98.6 °F (37 °C)] 98.6 °F (37 °C)  Heart Rate:  [67-87] 75  Resp:  [16-18] 17  BP: (133-210)/() 133/67   Body mass index is 27.38 kg/m².      Physical Exam  Vitals reviewed.   Constitutional:       General: She is not in acute distress.     Appearance: Normal appearance. She is not ill-appearing.   HENT:      Head: Normocephalic and atraumatic.      Mouth/Throat:      Mouth: Mucous membranes are moist.   Eyes:      Extraocular Movements: Extraocular movements intact.      Pupils:  Pupils are equal, round, and reactive to light.   Cardiovascular:      Rate and Rhythm: Normal rate and regular rhythm.      Comments: Grade 2/6 systolic murmur  Pulmonary:      Effort: Pulmonary effort is normal. No respiratory distress.      Breath sounds: Normal breath sounds. No wheezing or rales.   Abdominal:      General: Abdomen is flat. Bowel sounds are normal. There is no distension.      Tenderness: There is no abdominal tenderness. There is no guarding.   Musculoskeletal:         General: No swelling.   Skin:     General: Skin is warm and dry.      Capillary Refill: Capillary refill takes less than 2 seconds.      Findings: No erythema.   Neurological:      General: No focal deficit present.      Mental Status: She is alert and oriented to person, place, and time.      Comments: RUE strength 4/5  RLE strength 3/5   Psychiatric:         Mood and Affect: Mood normal.         Behavior: Behavior normal.      Discharge Disposition  [facility]    Visiting Nurse:    yes    PT/OT:  yes    Safety Evaluation:  yes    DME  TBD    Discharge Diet:      Dietary Orders (From admission, onward)     Start     Ordered    08/19/21 0635  Diet Regular; Consistent Carbohydrate, Cardiac  Diet Effective Now     Question Answer Comment   Diet Texture / Consistency Regular    Common Modifiers Consistent Carbohydrate    Common Modifiers Cardiac        08/19/21 0635                Activity at Discharge:  As tolerated    Pre-discharge education  BP, DM, medications, follow up    Follow-up Appointments  Future Appointments   Date Time Provider Department Center   11/8/2021  9:20 AM [facility]   11/15/2021  9:30 AM Nathen Bailey MD [facility]     Additional Instructions for the Follow-ups that You Need to Schedule     Discharge Follow-up with PCP   As directed       Currently Documented PCP:    Nathen Bailey MD    PCP Phone Number:    [phone]     Follow Up Details: 1 week after  discharge from SNF         Discharge Follow-up with Specified Provider: Neurology, first available; 2 Weeks   As directed      To: Neurology, first available    Follow Up: 2 Weeks    Follow Up Details: Lacunar infarct, TIA         Discharge Follow-up with Specified Provider: neurology   As directed      To: neurology    Follow Up Details: first available, new patient appt f/u stroke               Test Results Pending at DischargeL to be f/u by SNF provider and pcp  Pending Labs     Order Current Status    Urine Culture - Urine, Urine, Clean Catch In process           [place] Disla  08/19/21  18:01 EDT    Time: 30 min  Plan was discussed with attending who spoke with neuro stroke who was OK with patient going to rehab tonight.

## 2021-08-19 NOTE — THERAPY EVALUATION
Patient Name: Magnolia Rios  : 1934    MRN: [id number]                              Today's Date: 2021       Admit Date: 2021    Visit Dx:     ICD-10-CM ICD-9-CM   1. TIA (transient ischemic attack)  G45.9 435.9     Patient Active Problem List   Diagnosis   • Panic attack   • Mixed hyperlipidemia   • Type 2 diabetes mellitus without complication, without long-term current use of insulin (CMS/[facility])   • Mild episode of recurrent major depressive disorder (CMS/[facility])   • Menopause   • Medication monitoring encounter   • Medicare annual wellness visit, subsequent   • Vaccination not carried out   • Primary osteoarthritis of both knees   • Memory problem   • Subclinical hypothyroidism   • TIA (transient ischemic attack)     Past Medical History:   Diagnosis Date   • Arthritis    • Depression    • Diabetes mellitus (CMS/[facility])    • Disease of thyroid gland    • Heart palpitations    • Hyperlipidemia    • Panic attack      Past Surgical History:   Procedure Laterality Date   • COLONOSCOPY     • HYSTERECTOMY       General Information     [facility] Name 21          Physical Therapy Time and Intention    Document Type  evaluation  -     Mode of Treatment  physical therapy  -     Row Name 21          General Information    Patient Profile Reviewed  yes  -     Prior Level of Function  independent:;all household mobility pt ambulates short distances with walker within the home  -     Existing Precautions/Restrictions  fall history of dementia  -     Barriers to Rehab  previous functional deficit;cognitive status  -     Row Name 21          Living Environment    Lives With  spouse per daughter report, children assist as able and supplement with paid caregivers when family unavailable  -     Row Name 21          Home Main Entrance    Number of Stairs, Main Entrance  none ramp to enter home  -     Row Name 21          Cognition    Orientation Status  (Cognition)  oriented x 3  -       User Key  (r) = Recorded By, (t) = Taken By, (c) = Cosigned By    Initials Name Provider Type     Nadya Villasenor, PT Physical Therapist        Mobility     Row Name 08/19/21 0924          Bed Mobility    Bed Mobility  supine-sit  -     Supine-Sit [place] (Bed Mobility)  minimum assist (75% patient effort)  -     Assistive Device (Bed Mobility)  bed rails;head of bed elevated  -     Comment (Bed Mobility)  increased time and verbal cues required to complete  -     Row Name 08/19/21 0924          Transfers    Comment (Transfers)  sit to stand performed from elevated bed surface  -     Row Name 08/19/21 0924          Sit-Stand Transfer    Sit-Stand [place] (Transfers)  contact guard;verbal cues  -     Assistive Device (Sit-Stand Transfers)  walker, front-wheeled  -     Row Name 08/19/21 0924          Gait/Stairs (Locomotion)    [place] Level (Gait)  contact guard;verbal cues  -     Distance in Feet (Gait)  5  -     Bilateral Gait Deviations  forward flexed posture  -     Comment (Gait/Stairs)  pt requires verbal cues for proper distance from walker and safety during direction changes  -       User Key  (r) = Recorded By, (t) = Taken By, (c) = Cosigned By    Initials Name Provider Type     Nadya Villasenor PT Physical Therapist        Obj/Interventions     Row Name 08/19/21 0924          Range of Motion Comprehensive    Comment, General Range of Motion  LE ROM WFL bilaterally  -     Row Name 08/19/21 0924          Strength Comprehensive (MMT)    Comment, General Manual Muscle Testing (MMT) Assessment  LE strength 4-/5 bilaterally  -       User Key  (r) = Recorded By, (t) = Taken By, (c) = Cosigned By    Initials Name Provider Type     Nadya Villasenor PT Physical Therapist        Goals/Plan     Row Name 08/19/21 0924          Bed Mobility Goal 1 (PT)    Activity/Assistive Device (Bed Mobility Goal 1, PT)  bed mobility activities, all   -JW     [place] Level/Cues Needed (Bed Mobility Goal 1, PT)  supervision required  -JW     Time Frame (Bed Mobility Goal 1, PT)  3 days  -JW     Progress/Outcomes (Bed Mobility Goal 1, PT)  goal ongoing  -     Row Name 08/19/21 0924          Transfer Goal 1 (PT)    Activity/Assistive Device (Transfer Goal 1, PT)  transfers, all  -JW     [place] Level/Cues Needed (Transfer Goal 1, PT)  supervision required  -JW     Time Frame (Transfer Goal 1, PT)  3 days  -JW     Progress/Outcome (Transfer Goal 1, PT)  goal ongoing  -     Row Name 08/19/21 0924          Gait Training Goal 1 (PT)    Activity/Assistive Device (Gait Training Goal 1, PT)  gait (walking locomotion);assistive device use  -JW     [place] Level (Gait Training Goal 1, PT)  supervision required  -JW     Distance (Gait Training Goal 1, PT)  20  -JW     Time Frame (Gait Training Goal 1, PT)  3 days  -JW     Progress/Outcome (Gait Training Goal 1, PT)  goal ongoing  -       User Key  (r) = Recorded By, (t) = Taken By, (c) = Cosigned By    Initials Name Provider Type    Nadya Carter, PT Physical Therapist        Clinical Impression     Row Name 08/19/21 0924          Plan of Care Review    Plan of Care Reviewed With  patient;daughter  -     Outcome Summary  Physical therapy evaluation complete.  Patient performs supine to sit with min assist and sit to stand with CGA.  Patient performs gait with rolling walker x5 feet, CGA with verbal cues for safety and sequencing during direction changes.  Patient and daughter report ambulation with walker short distances within the home and state patient receives assistance from children and hired caregivers as needed.  Patient reports current mobility is not at baseline and daughter expresses interest in short term rehab prior to returning home.  Patient may benefit from short term rehab if current mobility does not improve, will continue to follow.  -     Row Name 08/19/21 0924          Therapy  Assessment/Plan (PT)    Patient/Family Therapy Goals Statement (PT)  go home  -     Rehab Potential (PT)  good, to achieve stated therapy goals  -     Criteria for Skilled Interventions Met (PT)  yes;meets criteria  -     Predicted Duration of Therapy Intervention (PT)  3 days  -     Row Name 08/19/21 0924          Positioning and Restraints    Pre-Treatment Position  sitting in chair/recliner  -     Post Treatment Position  chair  -JW     In Chair  reclined;call light within reach;encouraged to call for assist;with nsg  -       User Key  (r) = Recorded By, (t) = Taken By, (c) = Cosigned By    Initials Name Provider Type    Nadya Carter, PT Physical Therapist        Outcome Measures     Row Name 08/19/21 0930          Functional Assessment    Outcome Measure Options  AM-PAC 6 Clicks Daily Activity (OT)  -NAVEEN       User Key  (r) = Recorded By, (t) = Taken By, (c) = Cosigned By    Initials Name Provider Type    Jackelin Marie, OTR Occupational Therapist                       Physical Therapy Education                 Title: PT OT SLP Therapies (Done)     Topic: Physical Therapy (Done)     Point: Mobility training (Done)     Learning Progress Summary           Patient Acceptance, E,TB, VU by  at 8/19/2021 1353                               User Key     Initials Effective Dates Name Provider Type [facility] 06/16/21 -  Nadya Villasenor, AIDAN Physical Therapist PT              PT Recommendation and Plan  Planned Therapy Interventions (PT): balance training, bed mobility training, gait training, home exercise program, patient/family education, strengthening, transfer training  Plan of Care Reviewed With: patient, daughter  Outcome Summary: Physical therapy evaluation complete.  Patient performs supine to sit with min assist and sit to stand with CGA.  Patient performs gait with rolling walker x5 feet, CGA with verbal cues for safety and sequencing during direction changes.  Patient and  daughter report ambulation with walker short distances within the home and state patient receives assistance from children and hired caregivers as needed.  Patient reports current mobility is not at baseline and daughter expresses interest in short term rehab prior to returning home.  Patient may benefit from short term rehab if current mobility does not improve, will continue to follow.     Time Calculation:   PT Charges     Row Name 08/19/21 1354             Time Calculation    Start Time  0924  -      Stop Time  0954  -MARCO ANTONIO      Time Calculation (min)  30 min  -MARCO ANTONIO      PT Received On  08/19/21  -MARCO ANTONIO      PT - Next Appointment  08/20/21  -MARCO ANTONIO        User Key  (r) = Recorded By, (t) = Taken By, (c) = Cosigned By    Initials Name Provider Type    Nadya Carter, PT Physical Therapist        Therapy Charges for Today     Code Description Service Date Service Provider Modifiers Qty    [id number]  PT EVAL LOW COMPLEXITY 2 8/19/2021 Nadya Villasenor, PT GP 1          PT G-Codes  Outcome Measure Options: AM-PAC 6 Clicks Daily Activity (OT)  AM-PAC 6 Clicks Score (OT): 16    Nadya Villasenor PT  8/19/2021

## 2021-08-19 NOTE — PROGRESS NOTES
"SERVICE: [facility] HOSPITALIST    CONSULTANTS: neuro stroke    CHIEF COMPLAINT: f/u TIA vs stroke    SUBJECTIVE: The patient reports she is feeling well. Patient/daughter/staff note improvement in RUE strength since admit, some improvement in RLE strength but still weaker than previous. She notes no issues with eating/drinking. She and daughter are now agreeable to STR. She otherwise denies f/c/cough/soa/chest pain/n/v/d/abdominal pain or other new concerns.  OBJECTIVE:    /83 (BP Location: Right arm, Patient Position: Sitting)   Pulse 73   Temp 98.2 °F (36.8 °C) (Oral)   Resp 17   Ht 172 cm (67.72\")   Wt 81 kg (178 lb 9.2 oz)   SpO2 95%   BMI 27.38 kg/m²     MEDS/LABS REVIEWED AND ORDERED    amLODIPine, 5 mg, Oral, Q24H  aspirin, 81 mg, Oral, Daily   Or  aspirin, 300 mg, Rectal, Daily  atorvastatin, 80 mg, Oral, Nightly  cholecalciferol, 2,000 Units, Oral, Daily  clopidogrel, 75 mg, Oral, Daily  FLUoxetine, 20 mg, Oral, Daily  heparin (porcine), 5,000 Units, Subcutaneous, Q8H  insulin aspart, 0-7 Units, Subcutaneous, TID AC  levothyroxine, 25 mcg, Oral, Q AM  lisinopril, 10 mg, Oral, Q24H  memantine, 10 mg, Oral, Daily  multivitamin with minerals, 1 tablet, Oral, Daily  sodium chloride, 10 mL, Intravenous, Q12H      Physical Exam  Vitals reviewed.   Constitutional:       General: She is not in acute distress.     Appearance: Normal appearance. She is not ill-appearing.   HENT:      Head: Normocephalic and atraumatic.      Mouth/Throat:      Mouth: Mucous membranes are moist.   Eyes:      Extraocular Movements: Extraocular movements intact.      Pupils: Pupils are equal, round, and reactive to light.   Cardiovascular:      Rate and Rhythm: Normal rate and regular rhythm.      Comments: Grade 2/6 systolic murmur  Pulmonary:      Effort: Pulmonary effort is normal. No respiratory distress.      Breath sounds: Normal breath sounds. No wheezing or rales.   Abdominal:      General: Abdomen " is flat. Bowel sounds are normal. There is no distension.      Tenderness: There is no abdominal tenderness. There is no guarding.   Musculoskeletal:         General: No swelling.   Skin:     General: Skin is warm and dry.      Capillary Refill: Capillary refill takes less than 2 seconds.      Findings: No erythema.   Neurological:      General: No focal deficit present.      Mental Status: She is alert and oriented to person, place, and time.      Comments: RUE strength 4/5  RLE strength 3/5   Psychiatric:         Mood and Affect: Mood normal.         Behavior: Behavior normal.       LAB/DIAGNOSTICS:    Lab Results (last 24 hours)     Procedure Component Value Units Date/Time    Folate [[id number]]  (Normal) Collected: 08/19/21 0413    Specimen: Blood Updated: 08/19/21 1058     Folate 8.53 ng/mL     Narrative:      Results may be falsely increased if patient taking Biotin.      Vitamin B12 [[id number]]  (Normal) Collected: 08/19/21 0413    Specimen: Blood Updated: 08/19/21 1058     Vitamin B-12 517 pg/mL     Narrative:      Results may be falsely increased if patient taking Biotin.      C-reactive Protein [[id number]]  (Abnormal) Collected: 08/19/21 0413    Specimen: Blood Updated: 08/19/21 1042     C-Reactive Protein 0.75 mg/dL     Hemoglobin A1c [[id number]]  (Abnormal) Collected: 08/19/21 0413    Specimen: Blood Updated: 08/19/21 1036     Hemoglobin A1C 7.10 %     Narrative:      Hemoglobin A1C Ranges:    Increased Risk for Diabetes  5.7% to 6.4%  Diabetes                     >= 6.5%  Diabetic Goal                < 7.0%    Urine Culture - Urine, Urine, Clean Catch [[id number]] Collected: 08/19/21 0030    Specimen: Urine, Clean Catch Updated: 08/19/21 0843    POC Glucose Once [[id number]]  (Normal) Collected: 08/19/21 0713    Specimen: Blood Updated: 08/19/21 0733     Glucose 129 mg/dL      Comment: Meter: [id number] : [id number] Jose JAEGER       POC Glucose Once [[id number]]  (Normal) Collected: 08/19/21  0640    Specimen: Blood Updated: 08/19/21 0646     Glucose 128 mg/dL      Comment: Meter: [id number] : [id number] Pallavi JAEGER       Lipid Panel [[id number]] Collected: 08/19/21 0413    Specimen: Blood Updated: 08/19/21 0546     Total Cholesterol 145 mg/dL      Triglycerides 127 mg/dL      HDL Cholesterol 48 mg/dL      LDL Cholesterol  74 mg/dL      VLDL Cholesterol 23 mg/dL      LDL/HDL Ratio 1.49    Narrative:      Cholesterol Reference Ranges  (U.S. Department of Health and Human Services ATP III Classifications)    Desirable          <200 mg/dL  Borderline High    200-239 mg/dL  High Risk          >240 mg/dL      Triglyceride Reference Ranges  (U.S. Department of Health and Human Services ATP III Classifications)    Normal           <150 mg/dL  Borderline High  150-199 mg/dL  High             200-499 mg/dL  Very High        >500 mg/dL    HDL Reference Ranges  (U.S. Department of Health and Human Services ATP III Classifcations)    Low     <40 mg/dl (major risk factor for CHD)  High    >60 mg/dl ('negative' risk factor for CHD)        LDL Reference Ranges  (U.S. Department of Health and Human Services ATP III Classifcations)    Optimal          <100 mg/dL  Near Optimal     100-129 mg/dL  Borderline High  130-159 mg/dL  High             160-189 mg/dL  Very High        >189 mg/dL    Basic Metabolic Panel [[id number]]  (Abnormal) Collected: 08/19/21 0413    Specimen: Blood Updated: 08/19/21 0546     Glucose 130 mg/dL      BUN 23 mg/dL      Creatinine 1.42 mg/dL      Sodium 140 mmol/L      Potassium 3.8 mmol/L      Chloride 106 mmol/L      CO2 25.5 mmol/L      Calcium 9.5 mg/dL      eGFR Non African Amer 35 mL/min/1.73      BUN/Creatinine Ratio 16.2     Anion Gap 8.5 mmol/L     Narrative:      GFR Normal >60  Chronic Kidney Disease <60  Kidney Failure <15      TSH [[id number]]  (Normal) Collected: 08/19/21 0413    Specimen: Blood Updated: 08/19/21 0536     TSH 3.900 uIU/mL     CBC (No Diff) [[id number]]   (Abnormal) Collected: 08/19/21 0413    Specimen: Blood Updated: 08/19/21 0512     WBC 9.55 10*3/mm3      RBC 3.99 10*6/mm3      Hemoglobin 11.3 g/dL      Hematocrit 35.7 %      MCV 89.5 fL      MCH 28.3 pg      MCHC 31.7 g/dL      RDW 12.9 %      RDW-SD 42.5 fl      MPV 10.9 fL      Platelets 246 10*3/mm3     Urinalysis, Microscopic Only - Urine, Clean Catch [[id number]]  (Abnormal) Collected: 08/19/21 0030    Specimen: Urine, Clean Catch Updated: 08/19/21 0048     RBC, UA 3-5 /HPF      WBC, UA 13-20 /HPF      Bacteria, UA 4+ /HPF      Squamous Epithelial Cells, UA 0-2 /HPF      Hyaline Casts, UA None Seen /LPF      Methodology Manual Light Microscopy    Urinalysis With Microscopic If Indicated (No Culture) - Urine, Clean Catch [[id number]]  (Abnormal) Collected: 08/19/21 0030    Specimen: Urine, Clean Catch Updated: 08/19/21 0038     Color, UA Straw     Appearance, UA Slightly Cloudy     pH, UA 6.5     Specific Gravity, UA <=1.005     Glucose, UA Negative     Ketones, UA Negative     Bilirubin, UA Negative     Blood, UA Trace     Protein, UA Negative     Leuk Esterase, UA Small (1+)     Nitrite, UA Positive     Urobilinogen, UA 0.2 E.U./dL    POC Glucose Once [[id number]]  (Abnormal) Collected: 08/18/21 2344    Specimen: Blood Updated: 08/18/21 2350     Glucose 143 mg/dL      Comment: Meter: [id number] : [id number] Jos Ovalleie ARNAV       Phosphorus [[id number]]  (Normal) Collected: 08/18/21 2032    Specimen: Blood Updated: 08/18/21 2344     Phosphorus 3.0 mg/dL     Magnesium [[id number]]  (Normal) Collected: 08/18/21 2032    Specimen: Blood Updated: 08/18/21 2344     Magnesium 2.2 mg/dL     Sedimentation Rate [[id number]]  (Normal) Collected: 08/18/21 2032    Specimen: Blood Updated: 08/18/21 2338     Sed Rate 21 mm/hr     [place] Draw [[id number]] Collected: 08/18/21 2032    Specimen: Blood Updated: 08/18/21 2145    Narrative:      The following orders were created for panel order [place] Draw.  Procedure                                Abnormality         Status                     ---------                               -----------         ------                     Green Top (Gel)[[id number]]                                  Final result               Lavender Top[[id number]]                                     Final result               Gold Top - SST[[id number]]                                                                Please view results for these tests on the individual orders.    Lavender Top [[id number]] Collected: 08/18/21 2032    Specimen: Blood Updated: 08/18/21 2145     Extra Tube hold for add-on     Comment: Auto resulted       Green Top (Gel) [[id number]] Collected: 08/18/21 2032    Specimen: Blood Updated: 08/18/21 2145     Extra Tube Hold for add-ons.     Comment: Auto resulted.       COVID PRE-OP / PRE-PROCEDURE SCREENING ORDER (NO ISOLATION) - Swab, Nasal Cavity [[id number]]  (Normal) Collected: 08/18/21 2049    Specimen: Swab from Nasal Cavity Updated: 08/18/21 2131    Narrative:      The following orders were created for panel order COVID PRE-OP / PRE-PROCEDURE SCREENING ORDER (NO ISOLATION) - Swab, Nasal Cavity.  Procedure                               Abnormality         Status                     ---------                               -----------         ------                     COVID-19,[facility]...[[id number]]  Normal              Final result                 Please view results for these tests on the individual orders.    COVID-19,[facility] [employer] IN-HOUSE,Nasal Swab No Transport Media 3-4 HR TAT - Swab, Nasal Cavity [[id number]]  (Normal) Collected: 08/18/21 2049    Specimen: Swab from Nasal Cavity Updated: 08/18/21 2131     COVID19 Not Detected    Narrative:      Fact sheet for providers: https://www.fda.gov/media/101196/download     Fact sheet for patients: https://www.fda.gov/media/496276/download    Test performed by PCR.    Consider negative results in combination with clinical observations, patient  history, and epidemiological information.    Troponin [[id number]]  (Normal) Collected: 08/18/21 2032    Specimen: Blood Updated: 08/18/21 2101     Troponin T <0.010 ng/mL     Narrative:      Troponin T Reference Range:  <= 0.03 ng/mL-   Negative for AMI  >0.03 ng/mL-     Abnormal for myocardial necrosis.  Clinicians would have to utilize clinical acumen, EKG, Troponin and serial changes to determine if it is an Acute Myocardial Infarction or myocardial injury due to an underlying chronic condition.       Results may be falsely decreased if patient taking Biotin.      Comprehensive Metabolic Panel [[id number]]  (Abnormal) Collected: 08/18/21 2032    Specimen: Blood Updated: 08/18/21 2059     Glucose 190 mg/dL      BUN 28 mg/dL      Creatinine 1.64 mg/dL      Sodium 136 mmol/L      Potassium 4.3 mmol/L      Chloride 102 mmol/L      CO2 25.0 mmol/L      Calcium 9.4 mg/dL      Total Protein 6.4 g/dL      Albumin 3.80 g/dL      ALT (SGPT) 9 U/L      AST (SGOT) 13 U/L      Alkaline Phosphatase 102 U/L      Total Bilirubin 0.3 mg/dL      eGFR Non African Amer 30 mL/min/1.73      Globulin 2.6 gm/dL      A/G Ratio 1.5 g/dL      BUN/Creatinine Ratio 17.1     Anion Gap 9.0 mmol/L     Narrative:      GFR Normal >60  Chronic Kidney Disease <60  Kidney Failure <15      Protime-INR [[id number]]  (Normal) Collected: 08/18/21 2032    Specimen: Blood Updated: 08/18/21 2053     Protime 13.8 Seconds      INR 1.06    Narrative:      Therapeutic Ranges for INR: 2.0-3.0 (PT 20-30)                              2.5-3.5 (PT 25-34)    aPTT [[id number]]  (Normal) Collected: 08/18/21 2032    Specimen: Blood Updated: 08/18/21 2053     PTT 28.6 seconds     Narrative:      PTT = The equivalent PTT values for the therapeutic range of heparin levels at 0.1 to 0.7 U/ml are 53 to 110 seconds.      CBC & Differential [[id number]]  (Abnormal) Collected: 08/18/21 2032    Specimen: Blood Updated: 08/18/21 2040    Narrative:      The following orders were  created for panel order CBC & Differential.  Procedure                               Abnormality         Status                     ---------                               -----------         ------                     CBC Auto Differential[[id number]]        Abnormal            Final result                 Please view results for these tests on the individual orders.    CBC Auto Differential [[id number]]  (Abnormal) Collected: 08/18/21 2032    Specimen: Blood Updated: 08/18/21 2040     WBC 9.77 10*3/mm3      RBC 4.15 10*6/mm3      Hemoglobin 12.0 g/dL      Hematocrit 37.1 %      MCV 89.4 fL      MCH 28.9 pg      MCHC 32.3 g/dL      RDW 13.1 %      RDW-SD 42.5 fl      MPV 10.7 fL      Platelets 248 10*3/mm3      Neutrophil % 62.4 %      Lymphocyte % 25.9 %      Monocyte % 9.8 %      Eosinophil % 1.1 %      Basophil % 0.5 %      Immature Grans % 0.3 %      Neutrophils, Absolute 6.09 10*3/mm3      Lymphocytes, Absolute 2.53 10*3/mm3      Monocytes, Absolute 0.96 10*3/mm3      Eosinophils, Absolute 0.11 10*3/mm3      Basophils, Absolute 0.05 10*3/mm3      Immature Grans, Absolute 0.03 10*3/mm3      nRBC 0.0 /100 WBC     POC Glucose Once [[id number]]  (Abnormal) Collected: 08/18/21 2014    Specimen: Blood Updated: 08/18/21 2021     Glucose 180 mg/dL      Comment: Meter: [id number] : [id number] [facility]           ECG 12 Lead   Preliminary Result   HEART RATE= 84  bpm   RR Interval= 712  ms   SD Interval= 185  ms   P Horizontal Axis= 21  deg   P Front Axis= 3  deg   QRSD Interval= 78  ms   QT Interval= 414  ms   QRS Axis= -2  deg   T Wave Axis= 64  deg   - BORDERLINE ECG -   Sinus rhythm   Borderline prolonged QT interval   Electronically Signed By:    Date and Time of Study: 2021-08-18 20:31:27        Results for orders placed during the hospital encounter of 08/18/21    Adult Transthoracic Echo Complete W/ Cont if Necessary Per Protocol (With Agitated Saline)    Interpretation Summary  · Estimated left  ventricular EF = 60% Left ventricular systolic function is normal.  · Saline test results are negative.  · Left ventricular diastolic function was normal.    CT Angiogram Neck    Result Date: 8/19/2021  No intracranial arterial vascular occlusion or high-grade stenosis. No flow-limiting stenosis in the arterial vasculature of the neck. Signer Name: UMA DAO MD  Signed: 8/19/2021 9:39 AM  Workstation Name: [facility]  Radiology [facility]    XR Chest 1 View    Result Date: 8/18/2021  No acute cardiopulmonary findings. Signer Name: Karen Herr MD  Signed: 8/18/2021 8:54 PM  Workstation Name: [id number]  Radiology [facility]    CT Angiogram Head    Result Date: 8/19/2021  No intracranial arterial vascular occlusion or high-grade stenosis. No flow-limiting stenosis in the arterial vasculature of the neck. Signer Name: UMA DAO MD  Signed: 8/19/2021 9:39 AM  Workstation Name: [facility]  Radiology [facility]    CT Head Without Contrast Stroke Protocol    Result Date: 8/18/2021  1.  No acute intracranial findings. 2.  There is ventricular enlargement which appears out of proportion to the degree of cortical atrophy. This finding can be seen in normal pressure hydrocephalus. Findings are similar to the prior study of June 13, 2019. Signer Name: Ayden Fonseca MD  Signed: 8/18/2021 8:31 PM  Workstation Name: [id number]  Radiology [facility]    ASSESSMENT/PLAN:  TIA vs stroke: neurostroke following  Aspirin/plavix/statin in place  Continue PT/OT, awaiting possible STR    R/O UTI: check cath specimen     Uncontrolled hypertension: New diagnosis  SBP remains 170's to 200s despite labetalol  Increase prn dose of labetalol  Add amlodipine 5 mg daily, lisinopril 10 mg daily  Monitor     DM2 with hyperglycemia: A1C 7.1%  Body mass index is 27.38 kg/m².  TSH normal   Holding home metformin due to testing  Check accuchecks, low-dose SSI, CC diet    CKD stage 3: at  "baseline 1.2-1.4, creatinine currently 1.42    Hypothyroidism: TSH normal on home dose levothyroxine 25 mcg daily    HLD: high dose statin in place     Dementia: No current acute issues    PLAN FOR DISPOSITION: AUGUSTA Fajardo  Hospitalist, [facility]  08/19/21  08:25 EDT    \"Dictated utilizing Dragon dictation\"'  "

## 2021-08-19 NOTE — SIGNIFICANT NOTE
08/19/21 0835   Communication Assessment/Intervention   Document Type other (see comments)  (Screening note)   Subjective Information complains of  (difficulty using right hand)   Patient Observations alert;cooperative   Patient/Family/Caregiver Comments/Observations Pt seen upright in bed with daughter present at bedside. Pt eating breakfast.   Session Not Performed other (see comments)   Evaluation Not Performed, Comment Completed screening. Pt and daughter with no complaints of speech difficulty. Pt able to converse with SLP and daughter without any deficits noted. Reports she is having no difficulty swallowing and passed RN Stroke Dysphagia Screen. No need for full evaluation of communication skills at this time due to pt being at baseline status. Pt and daughter in agreement and decline further evaluation at this time.    Symptoms Noted During/After Treatment none   General Information   Patient Profile Reviewed yes   Pertinent History Of Current Problem Pt admitted with right knee pain, frequent falls and progressive weakness w/worsening over the last 3-4 years. Unable to get up from chair or move both legs last night. RUE weakness and jerking noted as well with difficulty feeding/grasping objects. No speech difficulties reported. Passed RN stroke dysphagia screen. Hx of dementia, DM, HTN, HLD, OA, depression, colonoscopy, hysterectomy.    Precautions/Limitations, Vision WFL with corrective lenses   Precautions/Limitations, Hearing WFL   Prior Level of Function-Communication cognitive-linguistic impairment;other (see comments)  (memory deficits reported)   Plans/Goals Discussed with patient and family;agreed upon   Barriers to Rehab none identified   Patient's Goals for Discharge return to home;return to all previous roles/activities   Family Goals for Discharge patient able to return to previous activities/roles   Pain   Additional Documentation   (no current pain reported)

## 2021-08-21 LAB — BACTERIA SPEC AEROBE CULT: ABNORMAL

## 2021-08-22 NOTE — CASE MANAGEMENT/SOCIAL WORK
Case Management Discharge Note      Final Note: [facility]    Provided Post Acute Provider List?: N/A  Provided Post Acute Provider Quality & Resource List?: N/A    Selected Continued Care - Discharged on 8/19/2021 Admission date: 8/18/2021 - Discharge disposition: Rehab Facility or Unit (DC - External)    Destination Coordination complete.    Service Provider Selected Services Address Phone Fax Patient Preferred    [facility]  Skilled Nursing [address] [place] [id number] [phone] --          Durable Medical Equipment    No services have been selected for the patient.              Dialysis/Infusion    No services have been selected for the patient.              Home Medical Care    No services have been selected for the patient.              Therapy    No services have been selected for the patient.              Community Resources    No services have been selected for the patient.              Community & DME    No services have been selected for the patient.                       Final Discharge Disposition Code: 03 - skilled nursing facility (SNF)

## 2021-08-24 NOTE — PROGRESS NOTES
Urine culture resulted on first specimen that was clean catch. Second specimen was catheterized specimen and did not meet criteria for culture. Therefore, no antibiotic was prescribed.

## 2021-09-10 ENCOUNTER — TELEPHONE (OUTPATIENT)
Dept: FAMILY MEDICINE CLINIC | Facility: CLINIC | Age: 86
End: 2021-09-10

## 2021-09-10 DIAGNOSIS — I63.9 LEFT BASAL GANGLIA EMBOLIC STROKE: Primary | ICD-10-CM

## 2021-09-10 NOTE — TELEPHONE ENCOUNTER
Caller: BRYSON OSULLIVAN, DAUGHTER    Best call back number: [phone]*    Equipment requested: HOSPITAL BED, WHEEL CHAIR, LIFT ASSIST    Reason for the request: PATIENT RETURNED HOME FROM REHAB TODAY VIA EMS, FROM HAVING A STROKE    Prescribing Provider: DR. ALESSANDRO FLEMING    Additional information or concerns: THE PATIENT'S DAUGHTER CALLED AND STATED THAT THEY NEED THIS EQUIPMENT ASAP, AND THEY ALSO NEED TO DISCUSS GETTING HOME HEALTH FOR THE PATIENT AS WELL.

## 2021-09-10 NOTE — TELEPHONE ENCOUNTER
Hub to share:    Left vm for daughter that rehab facility should have arranged for the equipment she is neededing prior to her discharge.

## 2021-09-10 NOTE — TELEPHONE ENCOUNTER
Daughter called back, she said the nursing facility did not send an order to [facility],there is no doctor on site and they seemed unaware that they were supposed to even write the order. She said the EMS that brought her home said to contact pcp since it is needed now.     Okay ordered and printed order for hospital bed ,wheelchair and a patient lift due to her recent stroke.  Lets fax this to [facility]

## 2021-09-12 ENCOUNTER — HOSPITAL ENCOUNTER (EMERGENCY)
Facility: HOSPITAL | Age: 86
Discharge: HOME | End: 2021-09-12
Attending: EMERGENCY MEDICINE | Admitting: EMERGENCY MEDICINE

## 2021-09-12 ENCOUNTER — APPOINTMENT (OUTPATIENT)
Dept: GENERAL RADIOLOGY | Facility: HOSPITAL | Age: 86
End: 2021-09-12

## 2021-09-12 ENCOUNTER — APPOINTMENT (OUTPATIENT)
Dept: CT IMAGING | Facility: HOSPITAL | Age: 86
End: 2021-09-12

## 2021-09-12 VITALS
DIASTOLIC BLOOD PRESSURE: 81 MMHG | OXYGEN SATURATION: 94 % | TEMPERATURE: 98.2 F | BODY MASS INDEX: 24.87 KG/M2 | WEIGHT: 164.1 LBS | SYSTOLIC BLOOD PRESSURE: 157 MMHG | HEIGHT: 68 IN | RESPIRATION RATE: 18 BRPM | HEART RATE: 82 BPM

## 2021-09-12 DIAGNOSIS — E86.0 DEHYDRATION: Primary | ICD-10-CM

## 2021-09-12 LAB
ALBUMIN SERPL-MCNC: 3.2 G/DL (ref 3.5–5.2)
ALBUMIN/GLOB SERPL: 0.9 G/DL
ALP SERPL-CCNC: 95 U/L (ref 39–117)
ALT SERPL W P-5'-P-CCNC: 25 U/L (ref 1–33)
ANION GAP SERPL CALCULATED.3IONS-SCNC: 8.8 MMOL/L (ref 5–15)
AST SERPL-CCNC: 20 U/L (ref 1–32)
BASOPHILS # BLD AUTO: 0.06 10*3/MM3 (ref 0–0.2)
BASOPHILS NFR BLD AUTO: 0.5 % (ref 0–1.5)
BILIRUB SERPL-MCNC: 0.5 MG/DL (ref 0–1.2)
BILIRUB UR QL STRIP: NEGATIVE
BUN SERPL-MCNC: 37 MG/DL (ref 8–23)
BUN/CREAT SERPL: 25.9 (ref 7–25)
CALCIUM SPEC-SCNC: 9.9 MG/DL (ref 8.6–10.5)
CHLORIDE SERPL-SCNC: 103 MMOL/L (ref 98–107)
CLARITY UR: CLEAR
CO2 SERPL-SCNC: 25.2 MMOL/L (ref 22–29)
COLOR UR: YELLOW
CREAT SERPL-MCNC: 1.43 MG/DL (ref 0.57–1)
D DIMER PPP FEU-MCNC: 3.2 MCGFEU/ML (ref 0–0.46)
DEPRECATED RDW RBC AUTO: 42.4 FL (ref 37–54)
EOSINOPHIL # BLD AUTO: 0.14 10*3/MM3 (ref 0–0.4)
EOSINOPHIL NFR BLD AUTO: 1.1 % (ref 0.3–6.2)
ERYTHROCYTE [DISTWIDTH] IN BLOOD BY AUTOMATED COUNT: 13.4 % (ref 12.3–15.4)
FLUAV RNA RESP QL NAA+PROBE: NOT DETECTED
FLUBV RNA RESP QL NAA+PROBE: NOT DETECTED
GFR SERPL CREATININE-BSD FRML MDRD: 35 ML/MIN/1.73
GLOBULIN UR ELPH-MCNC: 3.7 GM/DL
GLUCOSE SERPL-MCNC: 149 MG/DL (ref 65–99)
GLUCOSE UR STRIP-MCNC: NEGATIVE MG/DL
HCT VFR BLD AUTO: 37.7 % (ref 34–46.6)
HGB BLD-MCNC: 12 G/DL (ref 12–15.9)
HGB UR QL STRIP.AUTO: NEGATIVE
HOLD SPECIMEN: NORMAL
HOLD SPECIMEN: NORMAL
IMM GRANULOCYTES # BLD AUTO: 0.04 10*3/MM3 (ref 0–0.05)
IMM GRANULOCYTES NFR BLD AUTO: 0.3 % (ref 0–0.5)
KETONES UR QL STRIP: NEGATIVE
LEUKOCYTE ESTERASE UR QL STRIP.AUTO: NEGATIVE
LYMPHOCYTES # BLD AUTO: 2.75 10*3/MM3 (ref 0.7–3.1)
LYMPHOCYTES NFR BLD AUTO: 21.2 % (ref 19.6–45.3)
MCH RBC QN AUTO: 27.6 PG (ref 26.6–33)
MCHC RBC AUTO-ENTMCNC: 31.8 G/DL (ref 31.5–35.7)
MCV RBC AUTO: 86.9 FL (ref 79–97)
MONOCYTES # BLD AUTO: 1.25 10*3/MM3 (ref 0.1–0.9)
MONOCYTES NFR BLD AUTO: 9.6 % (ref 5–12)
NEUTROPHILS NFR BLD AUTO: 67.3 % (ref 42.7–76)
NEUTROPHILS NFR BLD AUTO: 8.75 10*3/MM3 (ref 1.7–7)
NITRITE UR QL STRIP: NEGATIVE
NRBC BLD AUTO-RTO: 0 /100 WBC (ref 0–0.2)
PH UR STRIP.AUTO: <=5 [PH] (ref 4.5–8)
PLATELET # BLD AUTO: 434 10*3/MM3 (ref 140–450)
PMV BLD AUTO: 10.8 FL (ref 6–12)
POTASSIUM SERPL-SCNC: 4.3 MMOL/L (ref 3.5–5.2)
PROT SERPL-MCNC: 6.9 G/DL (ref 6–8.5)
PROT UR QL STRIP: NEGATIVE
RBC # BLD AUTO: 4.34 10*6/MM3 (ref 3.77–5.28)
SARS-COV-2 RNA RESP QL NAA+PROBE: NOT DETECTED
SODIUM SERPL-SCNC: 137 MMOL/L (ref 136–145)
SP GR UR STRIP: 1.02 (ref 1–1.03)
TROPONIN T SERPL-MCNC: <0.01 NG/ML (ref 0–0.03)
UROBILINOGEN UR QL STRIP: NORMAL
WBC # BLD AUTO: 12.99 10*3/MM3 (ref 3.4–10.8)
WHOLE BLOOD HOLD SPECIMEN: NORMAL
WHOLE BLOOD HOLD SPECIMEN: NORMAL

## 2021-09-12 PROCEDURE — 70450 CT HEAD/BRAIN W/O DYE: CPT

## 2021-09-12 PROCEDURE — 71275 CT ANGIOGRAPHY CHEST: CPT

## 2021-09-12 PROCEDURE — 85379 FIBRIN DEGRADATION QUANT: CPT | Performed by: PHYSICIAN ASSISTANT

## 2021-09-12 PROCEDURE — 71045 X-RAY EXAM CHEST 1 VIEW: CPT

## 2021-09-12 PROCEDURE — 85025 COMPLETE CBC W/AUTO DIFF WBC: CPT | Performed by: PHYSICIAN ASSISTANT

## 2021-09-12 PROCEDURE — 99284 EMERGENCY DEPT VISIT MOD MDM: CPT

## 2021-09-12 PROCEDURE — 81003 URINALYSIS AUTO W/O SCOPE: CPT | Performed by: EMERGENCY MEDICINE

## 2021-09-12 PROCEDURE — 93005 ELECTROCARDIOGRAM TRACING: CPT | Performed by: PHYSICIAN ASSISTANT

## 2021-09-12 PROCEDURE — 84484 ASSAY OF TROPONIN QUANT: CPT | Performed by: PHYSICIAN ASSISTANT

## 2021-09-12 PROCEDURE — 87636 SARSCOV2 & INF A&B AMP PRB: CPT | Performed by: PHYSICIAN ASSISTANT

## 2021-09-12 PROCEDURE — 93010 ELECTROCARDIOGRAM REPORT: CPT | Performed by: INTERNAL MEDICINE

## 2021-09-12 PROCEDURE — 0 IOPAMIDOL PER 1 ML: Performed by: PHYSICIAN ASSISTANT

## 2021-09-12 PROCEDURE — 80053 COMPREHEN METABOLIC PANEL: CPT | Performed by: PHYSICIAN ASSISTANT

## 2021-09-12 PROCEDURE — 96360 HYDRATION IV INFUSION INIT: CPT

## 2021-09-12 PROCEDURE — 96361 HYDRATE IV INFUSION ADD-ON: CPT

## 2021-09-12 RX ORDER — SODIUM CHLORIDE 0.9 % (FLUSH) 0.9 %
10 SYRINGE (ML) INJECTION AS NEEDED
Status: DISCONTINUED | OUTPATIENT
Start: 2021-09-12 | End: 2021-09-12 | Stop reason: HOSPADM

## 2021-09-12 RX ORDER — ACETAMINOPHEN 500 MG
1000 TABLET ORAL ONCE
Status: COMPLETED | OUTPATIENT
Start: 2021-09-12 | End: 2021-09-12

## 2021-09-12 RX ORDER — SODIUM CHLORIDE 9 MG/ML
125 INJECTION, SOLUTION INTRAVENOUS CONTINUOUS
Status: DISCONTINUED | OUTPATIENT
Start: 2021-09-12 | End: 2021-09-12 | Stop reason: HOSPADM

## 2021-09-12 RX ADMIN — SODIUM CHLORIDE 125 ML/HR: 9 INJECTION, SOLUTION INTRAVENOUS at 18:42

## 2021-09-12 RX ADMIN — SODIUM CHLORIDE 1000 ML: 9 INJECTION, SOLUTION INTRAVENOUS at 15:14

## 2021-09-12 RX ADMIN — IOPAMIDOL 100 ML: 755 INJECTION, SOLUTION INTRAVENOUS at 17:36

## 2021-09-12 RX ADMIN — ACETAMINOPHEN 1000 MG: 500 TABLET, FILM COATED ORAL at 18:32

## 2021-09-12 RX ADMIN — IOPAMIDOL 18 ML: 755 INJECTION, SOLUTION INTRAVENOUS at 17:36

## 2021-09-12 NOTE — ED PROVIDER NOTES
EMERGENCY DEPARTMENT ENCOUNTER      Room Number: 05/05    History is provided by the patient, no translation services needed    HPI:    Chief complaint: Altered mental status    Location:     Quality/Severity:      Timing/Duration: The last 2 days    Modifying Factors: History of a stroke 2 weeks ago    Associated Symptoms: Eating less, speaking less, more agitated    Narrative: Pt is a 86 y.o. female who presents complaining of altered mental status brought in by EMS. Patient has no complaints. Patient states that she was brought in due to her daughter. Spoke with daughter and daughter states that patient has been more altered more than normal. But daughter states that patient has been more fatigued and more feisty with her. Daughter states that patient is refusing to talk to her. Daughter also states that patient is eating but less. Daughter states that patient had a strong odor to her diaper. Patient's daughter denies any history of fever or chills. Patient's daughter denies any history of vomiting or diarrhea. Patient's daughter states that she came home from the nursing home 2 days ago.      PMD: Nathen Bailey MD    REVIEW OF SYSTEMS  Review of Systems   Constitutional: Positive for activity change, appetite change and fatigue. Negative for chills and fever.   Eyes: Negative for pain and visual disturbance.   Respiratory: Negative for cough and shortness of breath.    Cardiovascular: Negative for chest pain and leg swelling.   Gastrointestinal: Negative for abdominal pain, constipation, diarrhea, nausea and vomiting.   Genitourinary: Negative for dysuria and flank pain.   Musculoskeletal: Negative for arthralgias and myalgias.   Skin: Negative for rash and wound.   Neurological: Positive for headaches. Negative for dizziness and syncope.   Psychiatric/Behavioral: Negative for suicidal ideas. The patient is not nervous/anxious.          PAST MEDICAL HISTORY  Active Ambulatory Problems     Diagnosis Date  Noted   • Panic attack    • Mixed hyperlipidemia    • Type 2 diabetes mellitus without complication, without long-term current use of insulin (CMS/[facility])    • Mild episode of recurrent major depressive disorder (CMS/[facility])    • Menopause 2017   • Medication monitoring encounter 09/15/2017   • Medicare annual wellness visit, subsequent 2018   • Vaccination not carried out 2018   • Primary osteoarthritis of both knees 2019   • Memory problem 2019   • Subclinical hypothyroidism 2019   • TIA (transient ischemic attack) 2021     Resolved Ambulatory Problems     Diagnosis Date Noted   • Postural dizziness 2017   • Heart palpitations    • Fracture of distal end of right radius with routine healing 2017     Past Medical History:   Diagnosis Date   • Arthritis    • Depression    • Diabetes mellitus (CMS/[facility])    • Disease of thyroid gland    • Hyperlipidemia        PAST SURGICAL HISTORY  Past Surgical History:   Procedure Laterality Date   • COLONOSCOPY     • HYSTERECTOMY         FAMILY HISTORY  Family History   Problem Relation Age of Onset   • No Known Problems Mother    • Seizures Father    • Breast cancer Neg Hx        SOCIAL HISTORY  Social History     Socioeconomic History   • Marital status:      Spouse name: Not on file   • Number of children: 4   • Years of education: Not on file   • Highest education level: Not on file   Tobacco Use   • Smoking status: Former Smoker     Types: Cigarettes     Quit date: 1989     Years since quittin.0   • Smokeless tobacco: Never Used   Vaping Use   • Vaping Use: Never used   Substance and Sexual Activity   • Alcohol use: Yes     Alcohol/week: 1.0 standard drinks     Types: 1 Cans of beer per week     Comment: occ   • Drug use: No   • Sexual activity: Yes     Partners: Male     Birth control/protection: Post-menopausal       ALLERGIES  Penicillins    No current facility-administered medications for this  encounter.    Current Outpatient Medications:   •  Diclofenac Sodium (VOLTAREN) 1 % gel gel, Apply 4 g topically to the appropriate area as directed 4 (Four) Times a Day As Needed (APPLY TO BILATERAL KNEES TOPICALLY FOUR TIMES A DAY FOR KNEE PAIN)., Disp: , Rfl:   •  amLODIPine (NORVASC) 5 MG tablet, Take 1 tablet by mouth Daily., Disp: , Rfl:   •  aspirin (aspirin) 81 MG EC tablet, Take 1 tablet by mouth Daily., Disp: , Rfl:   •  atorvastatin (LIPITOR) 80 MG tablet, Take 1 tablet by mouth Every Night., Disp: , Rfl:   •  clopidogrel (PLAVIX) 75 MG tablet, Take 1 tablet by mouth Daily., Disp: 30 tablet, Rfl:   •  ezetimibe (ZETIA) 10 MG tablet, TAKE 1 TABLET DAILY, Disp: 90 tablet, Rfl: 3  •  FLUoxetine (PROzac) 20 MG capsule, Take 1 capsule by mouth Daily., Disp: 90 capsule, Rfl: 1  •  levothyroxine (Synthroid) 25 MCG tablet, Take 1 tablet by mouth Daily. Indications: Underactive Thyroid, Disp: 90 tablet, Rfl: 1  •  lisinopril (PRINIVIL,ZESTRIL) 10 MG tablet, Take 1 tablet by mouth Daily., Disp: , Rfl:   •  memantine (NAMENDA) 10 MG tablet, Take 1 tablet by mouth Every Morning., Disp: 90 tablet, Rfl: 1  •  metFORMIN ER (GLUCOPHAGE-XR) 500 MG 24 hr tablet, Take 1 tablet by mouth Daily With Breakfast., Disp: 90 tablet, Rfl: 3    PHYSICAL EXAM  ED Triage Vitals [09/12/21 1255]   Temp Heart Rate Resp BP SpO2   98.2 °F (36.8 °C) 87 16 139/84 94 %      Temp src Heart Rate Source Patient Position BP Location FiO2 (%)   Oral Monitor Lying Right arm --       Physical Exam  Vitals and nursing note reviewed.   HENT:      Head: Normocephalic and atraumatic.      Mouth/Throat:      Mouth: Mucous membranes are dry.   Eyes:      Conjunctiva/sclera: Conjunctivae normal.   Cardiovascular:      Rate and Rhythm: Normal rate and regular rhythm.      Heart sounds: Normal heart sounds.   Pulmonary:      Effort: Pulmonary effort is normal. No respiratory distress.      Breath sounds: Normal breath sounds.   Abdominal:      General: Bowel  sounds are normal. There is no distension.      Palpations: Abdomen is soft.      Tenderness: There is no abdominal tenderness.   Musculoskeletal:         General: Normal range of motion.      Cervical back: Normal range of motion and neck supple.   Skin:     General: Skin is warm and dry.   Neurological:      Mental Status: She is alert and oriented to person, place, and time.      Motor: Weakness (Right upper and lower extremities) present.   Psychiatric:         Mood and Affect: Mood and affect normal.           LAB RESULTS  Lab Results (last 24 hours)     ** No results found for the last 24 hours. **            I ordered the above labs and reviewed the results    RADIOLOGY  No Radiology Exams Resulted Within Past 24 Hours    I ordered the above radiologic testing and reviewed the results    PROCEDURES  Procedures      PROGRESS AND CONSULTS  ED Course as of Sep 15 1814   Sun Sep 12, 2021   1427 Glucose(!): 149 [GT]   1510 BUN(!): 37 [GT]   1510 Creatinine(!): 1.43 [GT]   1701 EKG         EKG time / Interpretation time: 1511/1513  Rhythm/Rate: Sinus rhythm/88   AK: 184  QRS, axis: 4 normal axis  QTc 440  ST and T waves: No significant ST elevation, borderline T wave abnormalities  EKG Tracing Interpreted Contemporaneously by me, independently viewed by me and MD.  unchanged compared to prior 8/18/2021      [GT]      ED Course User Index  [GT] Hayley Montoya, RODERICK           MEDICAL DECISION MAKING    MDM     My differential diagnosis for altered mental status includes but is not limited to:  Hypoglycemia, hyperglycemia, DKA, overdose, ethanol intoxication, thiamine deficiency, niacin deficiency, hypothymia, hyperviscosity, Zane’s disease, hyponatremia, hypernatremia, liver failure, kidney failure, hyper or hypothyroid, no insufficiency, hypoxia, hypercarbia, carbon monoxide poisoning, postanoxic encephalopathy, ischemic stroke, intracranial bleed, subarachnoid hemorrhage, brain tumor, closed head injury,  epidural hematoma, epidural hematoma, seizure activity, postictal state, syncopal episode, disseminated encephalomyelitis, central pontine myelinolysis, post cardiac arrest, bacterial meningitis, viral meningitis, fungal meningitis, encephalitis, brain abscess, subdural empyema, hysteria, catatonic state, malingering, hypertensive encephalopathy, vasculitis, TTP, DIC    DIAGNOSIS  Final diagnoses:   Dehydration       Latest Documented Vital Signs:  As of 18:14 EDT  BP- 157/81 HR- 82 Temp- 98.2 °F (36.8 °C) (Oral) O2 sat- 94%    DISPOSITION  Discharged home        Discussed pertinent findings with the patient/family.  Patient/Family voiced understanding of need to follow-up for recheck and further testing as needed.  Return to the Emergency Department warnings were given.         Medication List      No changes were made to your prescriptions during this visit.             Follow-up Information     Nathen Bailey MD. Call in 1 day.    Specialty: Family Medicine  Why: To schedule a follow up appointment  Contact information:  [address] [facility] [id number] [phone]                     Dictated utilizing Dragon dictation     Hayley Montoya PA-C  09/15/21 1818

## 2021-09-13 LAB — QT INTERVAL: 364 MS

## 2021-09-14 ENCOUNTER — TELEPHONE (OUTPATIENT)
Dept: FAMILY MEDICINE CLINIC | Facility: CLINIC | Age: 86
End: 2021-09-14

## 2021-09-14 NOTE — TELEPHONE ENCOUNTER
Patient has been admitted to [facility] today. Bonnie who is the nurse saw her and would like verbal order to treat patients heel, she said there is a spot that is not opened yet, and she also has a stage 2 on her coccyx. She did mention patient has not had bowel movement since getting home last Thursday. She listened and she did have bowel sounds, she advised the family to buy otc metamucil and keep her well hydrated.

## 2021-09-17 ENCOUNTER — TELEPHONE (OUTPATIENT)
Dept: FAMILY MEDICINE CLINIC | Facility: CLINIC | Age: 86
End: 2021-09-17

## 2021-09-17 DIAGNOSIS — M17.0 PRIMARY OSTEOARTHRITIS OF BOTH KNEES: Primary | ICD-10-CM

## 2021-09-17 DIAGNOSIS — I63.81 OTHER CEREBRAL INFARCTION DUE TO OCCLUSION OR STENOSIS OF SMALL ARTERY (HCC): ICD-10-CM

## 2021-09-17 RX ORDER — AMOXICILLIN 250 MG
1 CAPSULE ORAL DAILY PRN
Qty: 30 TABLET | Refills: 0 | Status: SHIPPED | OUTPATIENT
Start: 2021-09-17

## 2021-09-17 RX ORDER — HYDROCODONE BITARTRATE AND ACETAMINOPHEN 5; 325 MG/1; MG/1
1 TABLET ORAL EVERY 4 HOURS PRN
Qty: 18 TABLET | Refills: 0 | Status: SHIPPED | OUTPATIENT
Start: 2021-09-17

## 2021-09-17 NOTE — TELEPHONE ENCOUNTER
Bonnie from home health called to see if an order could be made for a stool softener/enema. She said patient has not had a bowel movement since last Thursday. Please advise

## 2021-09-17 NOTE — ADDENDUM NOTE
Addended by: ALESSANDRO FLEMING on: 9/17/2021 12:35 PM     Modules accepted: Orders     Wheelchair/Stroller

## 2021-09-17 NOTE — TELEPHONE ENCOUNTER
Hub to share:  Left vm w/ patients home health nurse that there has been two medications for her constipation and pain medicine to the [facility] in [place].

## 2021-09-17 NOTE — TELEPHONE ENCOUNTER
Nurse said she has talked to the family twice this week about buying otc stool softener,but since they have not bough anything she didn't know if senna could be used? she also said the family is requesting pain medication for her right leg. She said she is moaning out in pain when being moved, please advise.     Okay Senokot was sent in for constipation.  1 daily as needed    Secondly, I sent in Norco every 4 hours as needed for severe pain.

## 2021-09-17 NOTE — TELEPHONE ENCOUNTER
Have the family go out and buy over-the-counter Colace for stool softener.  Follow the directions on the box

## 2021-09-22 ENCOUNTER — TELEPHONE (OUTPATIENT)
Dept: FAMILY MEDICINE CLINIC | Facility: CLINIC | Age: 86
End: 2021-09-22

## 2021-09-22 DIAGNOSIS — L89.150 PRESSURE INJURY OF SACRAL REGION, UNSTAGEABLE (HCC): Primary | ICD-10-CM

## 2021-09-22 NOTE — TELEPHONE ENCOUNTER
Nurse with [facility] called, family is requesting order for a air mattress or some type of air device. She has a open wound on her sacral region, she does have a hospital bed too. They need a signed order faxed to them.     Okay air mattress has been ordered for her pressure ulcer,

## 2021-09-24 ENCOUNTER — TELEPHONE (OUTPATIENT)
Dept: FAMILY MEDICINE CLINIC | Facility: CLINIC | Age: 86
End: 2021-09-24

## 2021-09-24 NOTE — TELEPHONE ENCOUNTER
Home health nurse needing the ok to give patient a fleet enema today due to constipation. Please advise for verbal